# Patient Record
Sex: FEMALE | Race: BLACK OR AFRICAN AMERICAN | NOT HISPANIC OR LATINO | Employment: UNEMPLOYED | URBAN - METROPOLITAN AREA
[De-identification: names, ages, dates, MRNs, and addresses within clinical notes are randomized per-mention and may not be internally consistent; named-entity substitution may affect disease eponyms.]

---

## 2023-03-22 ENCOUNTER — HOSPITAL ENCOUNTER (EMERGENCY)
Facility: HOSPITAL | Age: 48
Discharge: HOME | End: 2023-03-22
Attending: EMERGENCY MEDICINE

## 2023-03-22 VITALS
TEMPERATURE: 98 F | HEART RATE: 112 BPM | DIASTOLIC BLOOD PRESSURE: 126 MMHG | WEIGHT: 133.6 LBS | SYSTOLIC BLOOD PRESSURE: 158 MMHG | RESPIRATION RATE: 22 BRPM | OXYGEN SATURATION: 98 %

## 2023-03-22 DIAGNOSIS — J32.9 SINUSITIS: Primary | ICD-10-CM

## 2023-03-22 DIAGNOSIS — M70.60 TROCHANTERIC BURSITIS: ICD-10-CM

## 2023-03-22 RX ORDER — NAPROXEN 500 MG/1
500 TABLET ORAL 2 TIMES DAILY WITH MEALS
Qty: 14 TABLET | Refills: 0 | Status: SHIPPED | OUTPATIENT
Start: 2023-03-22 | End: 2023-03-29

## 2023-03-22 RX ORDER — METHIMAZOLE 10 MG/1
10 TABLET ORAL DAILY
COMMUNITY

## 2023-03-22 RX ORDER — ALBUTEROL SULFATE 90 UG/1
2 AEROSOL, METERED RESPIRATORY (INHALATION) EVERY 6 HOURS PRN
COMMUNITY

## 2023-03-22 RX ORDER — AMLODIPINE BESYLATE 10 MG/1
10 TABLET ORAL DAILY
COMMUNITY

## 2023-03-22 RX ORDER — AMOXICILLIN AND CLAVULANATE POTASSIUM 875; 125 MG/1; MG/1
1 TABLET, FILM COATED ORAL EVERY 12 HOURS SCHEDULED
Qty: 14 TABLET | Refills: 0 | Status: SHIPPED | OUTPATIENT
Start: 2023-03-22 | End: 2023-03-29

## 2023-03-22 NOTE — ED NOTES
PA came and assessed patient and discharged before nursing    assessment  Pt   Was seen walking with no distress      Arnoldo Carrillo, RN  03/22/23 4722

## 2023-03-22 NOTE — ED PROVIDER NOTES
History  Chief Complaint   Patient presents with   • Cough     leg   • Leg Pain     Relates pain in L upper leg since accident many years ago  States recently it swelled up and went down again   • Nasal Congestion     C/o  post nasal drip for past three days causing her to cough     79-year-old female presenting today for evaluation of cough and nasal congestion over the past 2 days  Relays that she has a tendency to develop sinus infections rather easily  Notes a postnasal drip  Dry nonproductive cough  Notes mild sore throat and generalized body aches  States that the left outer hip was swollen and tender, this has been an ongoing issue over the past year since she had a fall landing directly onto that hip  Has had imaging which did not show any fractures  States that the swelling has gone down  Ongoing lower back pain  Denies calf pain or swelling, numbness, paresthesias, fevers, shortness of breath  Differential includes but is not limited to viral illness, sinusitis, bursitis  Prior to Admission Medications   Prescriptions Last Dose Informant Patient Reported? Taking? albuterol (PROVENTIL HFA,VENTOLIN HFA) 90 mcg/act inhaler   Yes Yes   Sig: Inhale 2 puffs every 6 (six) hours as needed for wheezing   amLODIPine (NORVASC) 10 mg tablet   Yes Yes   Sig: Take 10 mg by mouth daily   methimazole (TAPAZOLE) 10 mg tablet   Yes Yes   Sig: Take 10 mg by mouth in the morning      Facility-Administered Medications: None       Past Medical History:   Diagnosis Date   • Asthma    • Disease of thyroid gland    • Hypertension        Past Surgical History:   Procedure Laterality Date   • TUBAL LIGATION         History reviewed  No pertinent family history  I have reviewed and agree with the history as documented      E-Cigarette/Vaping   • E-Cigarette Use Never User      E-Cigarette/Vaping Substances     Social History     Tobacco Use   • Smoking status: Every Day     Packs/day: 0 50     Types: Cigarettes • Smokeless tobacco: Never   Vaping Use   • Vaping Use: Never used   Substance Use Topics   • Alcohol use: Not Currently   • Drug use: Yes     Types: Marijuana       Review of Systems   Constitutional: Negative  Negative for chills, fever and unexpected weight change  Denies IV drug use     HENT: Positive for congestion and sore throat  Negative for dental problem, drooling, ear discharge, ear pain, sinus pressure and sneezing  Eyes: Negative  Respiratory: Positive for cough  Negative for chest tightness, shortness of breath and wheezing  Cardiovascular: Negative  Negative for chest pain and palpitations  Gastrointestinal: Negative  Negative for abdominal pain, constipation, diarrhea, nausea and vomiting  Genitourinary: Negative  Negative for difficulty urinating, dysuria, flank pain, frequency, hematuria and urgency  Denies numbness, tingling in the groin  Musculoskeletal: Positive for arthralgias and back pain  Negative for gait problem, joint swelling, myalgias, neck pain and neck stiffness  Skin: Negative  Negative for color change  Neurological: Negative  Negative for dizziness, tremors, weakness, light-headedness, numbness and headaches  All other systems reviewed and are negative  Physical Exam  Physical Exam  Vitals and nursing note reviewed  Constitutional:       General: She is not in acute distress  Appearance: She is well-developed  She is not diaphoretic  HENT:      Head: Normocephalic and atraumatic  Right Ear: External ear normal       Left Ear: External ear normal       Nose: Nose normal       Mouth/Throat:      Pharynx: No oropharyngeal exudate  Eyes:      General: No scleral icterus  Right eye: No discharge  Left eye: No discharge  Conjunctiva/sclera: Conjunctivae normal       Pupils: Pupils are equal, round, and reactive to light  Cardiovascular:      Rate and Rhythm: Regular rhythm  Tachycardia present  Pulses: Normal pulses  Heart sounds: Normal heart sounds  No murmur heard  No friction rub  No gallop  Pulmonary:      Effort: Pulmonary effort is normal  No respiratory distress  Breath sounds: Normal breath sounds  No stridor  No wheezing or rales  Comments: SPO2 is 98% indicating adequate oxygenation  Chest:      Chest wall: No tenderness  Abdominal:      General: Abdomen is flat  Bowel sounds are normal  There is no distension  Palpations: Abdomen is soft  There is no mass  Tenderness: There is no abdominal tenderness  There is no guarding or rebound  Hernia: No hernia is present  Musculoskeletal:        Arms:       Cervical back: Normal range of motion and neck supple  Legs:    Lymphadenopathy:      Cervical: No cervical adenopathy  Skin:     General: Skin is warm and dry  Capillary Refill: Capillary refill takes less than 2 seconds  Coloration: Skin is not pale  Findings: No erythema or rash  Neurological:      General: No focal deficit present  Mental Status: She is alert and oriented to person, place, and time  Mental status is at baseline           Vital Signs  ED Triage Vitals [03/22/23 1322]   Temperature Pulse Respirations Blood Pressure SpO2   98 °F (36 7 °C) (!) 112 22 (!) 158/126 98 %      Temp Source Heart Rate Source Patient Position - Orthostatic VS BP Location FiO2 (%)   Tympanic Monitor Sitting Right arm --      Pain Score       8           Vitals:    03/22/23 1322   BP: (!) 158/126   Pulse: (!) 112   Patient Position - Orthostatic VS: Sitting         Visual Acuity      ED Medications  Medications - No data to display    Diagnostic Studies  Results Reviewed     None                 No orders to display              Procedures  Procedures         ED Course                               SBIRT 22yo+    Flowsheet Row Most Recent Value   SBIRT (25 yo +)    In order to provide better care to our patients, we are screening all of our patients for alcohol and drug use  Would it be okay to ask you these screening questions? Yes Filed at: 03/22/2023 1353   Initial Alcohol Screen: US AUDIT-C     1  How often do you have a drink containing alcohol? 0 Filed at: 03/22/2023 1353   2  How many drinks containing alcohol do you have on a typical day you are drinking? 0 Filed at: 03/22/2023 1353   3a  Male UNDER 65: How often do you have five or more drinks on one occasion? 0 Filed at: 03/22/2023 1353   3b  FEMALE Any Age, or MALE 65+: How often do you have 4 or more drinks on one occassion? 0 Filed at: 03/22/2023 1353   Audit-C Score 0 Filed at: 03/22/2023 1353   JJ: How many times in the past year have you    Used an illegal drug or used a prescription medication for non-medical reasons? Never Filed at: 03/22/2023 1353                    Medical Decision Making  Patient appears well no distress, given history will treat for early sinusitis  Otherwise suspect greater trochanter bursitis  We will have patient follow-up with PCP  Patient is informed to return to the emergency department for worsening of symptoms and was given proper education regarding their diagnosis and symptoms  Otherwise the patient is informed to follow up with their primary care doctor for re-evaluation  The patient verbalizes understanding and agrees with above assessment and plan  All questions were answered  Please Note: Fluency Direct voice recognition software may have been used in the creation of this document  Wrong words or sound a like substitutions may have occurred due to the inherent limitations of the voice software  Sinusitis: acute illness or injury  Trochanteric bursitis: chronic illness or injury  Risk  Prescription drug management            Disposition  Final diagnoses:   Sinusitis   Trochanteric bursitis     Time reflects when diagnosis was documented in both MDM as applicable and the Disposition within this note     Time User Action Codes Description Comment    3/22/2023  2:11 PM Anish Ruiz Add [J32 9] Sinusitis     3/22/2023  2:11 PM Anish Pace Add [M70 60] Trochanteric bursitis       ED Disposition     ED Disposition   Discharge    Condition   Stable    Date/Time   Wed Mar 22, 2023  2:11 PM    Comment   Becky Livers discharge to home/self care  Follow-up Information     Follow up With Specialties Details Why Contact Info Additional [address] Emergency Department Emergency Medicine Go to  If symptoms worsen [address] [place] [address]  [phone] [address] [place] [address] Emergency Department, [place], [place], [address] [place] [address] Family Medicine Schedule an appointment as soon as possible for a visit in 1 day  [address] [place] [address] Dr Trang Yanes MD Otolaryngology Schedule an appointment as soon as possible for a visit in 1 day  [facility], [address] [place] [address]  [phone]             Patient's Medications   Discharge Prescriptions    AMOXICILLIN-CLAVULANATE (AUGMENTIN) 875-125 MG PER TABLET    Take 1 tablet by mouth every 12 (twelve) hours for 7 days       Start Date: 3/22/2023 End Date: 3/29/2023       Order Dose: 1 tablet       Quantity: 14 tablet    Refills: 0    NAPROXEN (NAPROSYN) 500 MG TABLET    Take 1 tablet (500 mg total) by mouth 2 (two) times a day with meals for 7 days       Start Date: 3/22/2023 End Date: 3/29/2023       Order Dose: 500 mg       Quantity: 14 tablet    Refills: 0       No discharge procedures on file      PDMP Review     None          ED Provider  Electronically Signed by           Asiya Pierson PA-C  03/22/23 4673

## 2023-07-19 ENCOUNTER — HOSPITAL ENCOUNTER (EMERGENCY)
Facility: HOSPITAL | Age: 48
Discharge: HOME | End: 2023-07-19
Attending: EMERGENCY MEDICINE
Payer: COMMERCIAL

## 2023-07-19 VITALS
HEART RATE: 85 BPM | OXYGEN SATURATION: 100 % | TEMPERATURE: 98.2 F | DIASTOLIC BLOOD PRESSURE: 90 MMHG | RESPIRATION RATE: 20 BRPM | SYSTOLIC BLOOD PRESSURE: 137 MMHG

## 2023-07-19 DIAGNOSIS — J32.9 SINUSITIS: Primary | ICD-10-CM

## 2023-07-19 PROCEDURE — 99284 EMERGENCY DEPT VISIT MOD MDM: CPT | Performed by: PHYSICIAN ASSISTANT

## 2023-07-19 PROCEDURE — 99282 EMERGENCY DEPT VISIT SF MDM: CPT

## 2023-07-19 RX ORDER — FLUTICASONE PROPIONATE 50 MCG
1 SPRAY, SUSPENSION (ML) NASAL DAILY
Qty: 16 G | Refills: 0 | Status: SHIPPED | OUTPATIENT
Start: 2023-07-19

## 2023-07-19 RX ORDER — PREDNISONE 50 MG/1
50 TABLET ORAL DAILY
Qty: 3 TABLET | Refills: 0 | Status: SHIPPED | OUTPATIENT
Start: 2023-07-19 | End: 2023-07-22

## 2023-07-19 NOTE — ED PROVIDER NOTES
History  Chief Complaint   Patient presents with   • Sore Throat   • Sinus Problem     Pt reports of sinus pressure with a headache started 2 days. Pt sts " I feel like I have a sinus infection."     Pt is a 52 yo F with PMH of HTN, Asthma, hyperthyroidism, and recurrent sinusitis who presents for evaluation of two days of nasal congestion, sinus pressure and pain, and headache. Sinus Problem  Pain details:     Location:  Frontal    Quality:  Pressure    Severity:  Moderate    Duration:  2 days    Timing:  Constant  Duration:  2 days  Progression:  Worsening  Chronicity:  Recurrent  Context: allergies    Context: not recent URI    Relieved by:  Nothing  Worsened by:  Lying down  Ineffective treatments:  None tried  Associated symptoms: headaches and sore throat    Associated symptoms: no chest pain, no chills, no cough, no ear pain, no fatigue, no fever, no hoarse voice, no mouth breathing, no nausea, no rhinorrhea, no shortness of breath, no sneezing, no snoring, no swollen glands, no tooth pain, no vertigo, no vomiting and no wheezing    Headaches:     Severity:  Moderate    Onset quality:  Gradual    Duration:  2 days    Timing:  Constant    Progression:  Worsening    Chronicity:  New  Risk factors: asthma    Risk factors: no allergic reaction and no COPD        Prior to Admission Medications   Prescriptions Last Dose Informant Patient Reported? Taking?    albuterol (PROVENTIL HFA,VENTOLIN HFA) 90 mcg/act inhaler   Yes No   Sig: Inhale 2 puffs every 6 (six) hours as needed for wheezing   amLODIPine (NORVASC) 10 mg tablet   Yes No   Sig: Take 10 mg by mouth daily   methimazole (TAPAZOLE) 10 mg tablet   Yes No   Sig: Take 10 mg by mouth in the morning   naproxen (NAPROSYN) 500 mg tablet   No No   Sig: Take 1 tablet (500 mg total) by mouth 2 (two) times a day with meals for 7 days      Facility-Administered Medications: None       Past Medical History:   Diagnosis Date   • Asthma    • Disease of thyroid gland • Hypertension        Past Surgical History:   Procedure Laterality Date   • TUBAL LIGATION         No family history on file. I have reviewed and agree with the history as documented. E-Cigarette/Vaping   • E-Cigarette Use Never User      E-Cigarette/Vaping Substances     Social History     Tobacco Use   • Smoking status: Every Day     Packs/day: 0.50     Types: Cigarettes   • Smokeless tobacco: Never   Vaping Use   • Vaping Use: Never used   Substance Use Topics   • Alcohol use: Not Currently   • Drug use: Yes     Types: Marijuana       Review of Systems   Constitutional: Negative for chills, fatigue and fever. HENT: Positive for sinus pressure, sinus pain and sore throat. Negative for ear pain, hoarse voice, rhinorrhea and sneezing. Eyes: Negative. Negative for pain and visual disturbance. Respiratory: Negative. Negative for snoring, cough, shortness of breath and wheezing. Cardiovascular: Negative. Negative for chest pain and palpitations. Gastrointestinal: Negative. Negative for abdominal pain, nausea and vomiting. Endocrine: Negative. Genitourinary: Negative. Negative for dysuria and hematuria. Musculoskeletal: Negative. Negative for arthralgias and back pain. Skin: Negative. Negative for color change and rash. Allergic/Immunologic: Negative. Neurological: Positive for headaches. Negative for vertigo, seizures and syncope. Hematological: Negative. Psychiatric/Behavioral: Negative. All other systems reviewed and are negative. Physical Exam  Physical Exam  Vitals and nursing note reviewed. Constitutional:       General: She is not in acute distress. Appearance: She is well-developed. HENT:      Head: Normocephalic and atraumatic. Right Ear: Tympanic membrane and ear canal normal.      Left Ear: Tympanic membrane and ear canal normal.      Nose: Congestion present.       Mouth/Throat:      Mouth: Mucous membranes are moist.      Pharynx: Uvula midline. Posterior oropharyngeal erythema present. No pharyngeal swelling. Tonsils: No tonsillar exudate or tonsillar abscesses. Eyes:      Conjunctiva/sclera: Conjunctivae normal.   Cardiovascular:      Rate and Rhythm: Normal rate and regular rhythm. Pulses: Normal pulses. Heart sounds: No murmur heard. Pulmonary:      Effort: Pulmonary effort is normal. No respiratory distress. Breath sounds: Normal breath sounds. Abdominal:      Palpations: Abdomen is soft. Tenderness: There is no abdominal tenderness. Musculoskeletal:         General: No swelling. Cervical back: Normal range of motion and neck supple. Skin:     General: Skin is warm and dry. Capillary Refill: Capillary refill takes less than 2 seconds. Neurological:      General: No focal deficit present. Mental Status: She is alert and oriented to person, place, and time. Psychiatric:         Mood and Affect: Mood normal.         Behavior: Behavior normal.         Vital Signs  ED Triage Vitals [07/19/23 0855]   Temperature Pulse Respirations Blood Pressure SpO2   98.2 °F (36.8 °C) 85 20 137/90 100 %      Temp Source Heart Rate Source Patient Position - Orthostatic VS BP Location FiO2 (%)   Temporal Monitor Sitting Right arm --      Pain Score       --           Vitals:    07/19/23 0855   BP: 137/90   Pulse: 85   Patient Position - Orthostatic VS: Sitting         Visual Acuity      ED Medications  Medications - No data to display    Diagnostic Studies  Results Reviewed     None                 No orders to display              Procedures  Procedures         ED Course                               SBIRT 22yo+    Flowsheet Row Most Recent Value   Initial Alcohol Screen: US AUDIT-C     1. How often do you have a drink containing alcohol? 0 Filed at: 07/19/2023 0919   2. How many drinks containing alcohol do you have on a typical day you are drinking? 0 Filed at: 07/19/2023 0919   3a. Male UNDER 65:  How often do you have five or more drinks on one occasion? 0 Filed at: 07/19/2023 0919   3b. FEMALE Any Age, or MALE 65+: How often do you have 4 or more drinks on one occassion? 0 Filed at: 07/19/2023 0919   Audit-C Score 0 Filed at: 07/19/2023 7471   JJ: How many times in the past year have you. .. Used an illegal drug or used a prescription medication for non-medical reasons? Never Filed at: 07/19/2023 7560                    Medical Decision Making  Sinusitis: acute illness or injury     Details: acute sinutisits  unlikely  infection at [facility]  steroid burst followed by flonase  pt educated on red flag sx that would necessitate return to ed. Risk  Prescription drug management. Disposition  Final diagnoses:   Sinusitis     Time reflects when diagnosis was documented in both MDM as applicable and the Disposition within this note     Time User Action Codes Description Comment    7/19/2023  9:54 AM Dayron Perkins [J32.9] Sinusitis       ED Disposition     ED Disposition   Discharge    Condition   Stable    Date/Time   Wed Jul 19, 2023  9:55 AM    Comment   Jhonathan Duncan discharge to home/self care.                Follow-up Information     Follow up With Specialties Details Why Contact Info Additional [address] [facility] Family Medicine Call  As needed [address], [address] [facility]  [phone]       [employer]  Call  to establish PCP in [address] [facility] Emergency Department Emergency Medicine Go to  If symptoms worsen [place]  [address] [place] [address] Emergency Department, [address] [place] [address], [phone]          Discharge Medication List as of 7/19/2023  9:55 AM      START taking these medications    Details   fluticasone (FLONASE) 50 mcg/act nasal spray 1 spray into each nostril daily, Starting Wed 7/19/2023, Normal      predniSONE 50 mg tablet Take 1 tablet (50 mg total) by mouth daily for 3 days, Starting Wed 7/19/2023, Until Sat 7/22/2023, Normal         CONTINUE these medications which have NOT CHANGED    Details   albuterol (PROVENTIL HFA,VENTOLIN HFA) 90 mcg/act inhaler Inhale 2 puffs every 6 (six) hours as needed for wheezing, Historical Med      amLODIPine (NORVASC) 10 mg tablet Take 10 mg by mouth daily, Historical Med      methimazole (TAPAZOLE) 10 mg tablet Take 10 mg by mouth in the morning, Historical Med      naproxen (NAPROSYN) 500 mg tablet Take 1 tablet (500 mg total) by mouth 2 (two) times a day with meals for 7 days, Starting Wed 3/22/2023, Until Wed 3/29/2023, Normal                 PDMP Review     None          ED Provider  Electronically Signed by           Marga Skiff, PA-C  07/21/23 8124

## 2023-07-19 NOTE — Clinical Note
Karey Shon was seen and treated in our emergency department on 7/19/2023. Diagnosis:     Hever Sylvester  may return to work on return date. She may return on this date: 07/20/2023         If you have any questions or concerns, please don't hesitate to call.       Mel Ragland PA-C    ______________________________           _______________          _______________  Hospital Representative                              Date                                Time

## 2023-09-16 ENCOUNTER — HOSPITAL ENCOUNTER (EMERGENCY)
Facility: HOSPITAL | Age: 48
Discharge: HOME | End: 2023-09-16
Attending: EMERGENCY MEDICINE
Payer: COMMERCIAL

## 2023-09-16 VITALS
OXYGEN SATURATION: 99 % | SYSTOLIC BLOOD PRESSURE: 181 MMHG | HEART RATE: 77 BPM | RESPIRATION RATE: 18 BRPM | DIASTOLIC BLOOD PRESSURE: 109 MMHG | TEMPERATURE: 97.1 F

## 2023-09-16 DIAGNOSIS — J32.9 SINUSITIS: Primary | ICD-10-CM

## 2023-09-16 PROCEDURE — 99282 EMERGENCY DEPT VISIT SF MDM: CPT

## 2023-09-16 PROCEDURE — 99284 EMERGENCY DEPT VISIT MOD MDM: CPT | Performed by: PHYSICIAN ASSISTANT

## 2023-09-16 RX ORDER — AMOXICILLIN AND CLAVULANATE POTASSIUM 875; 125 MG/1; MG/1
1 TABLET, FILM COATED ORAL EVERY 12 HOURS SCHEDULED
Qty: 20 TABLET | Refills: 0 | Status: SHIPPED | OUTPATIENT
Start: 2023-09-16 | End: 2023-09-26

## 2023-09-16 RX ORDER — DEXAMETHASONE 4 MG/1
10 TABLET ORAL ONCE
Status: COMPLETED | OUTPATIENT
Start: 2023-09-16 | End: 2023-09-16

## 2023-09-16 RX ADMIN — DEXAMETHASONE 10 MG: 4 TABLET ORAL at 09:44

## 2023-09-16 NOTE — ED PROVIDER NOTES
History  Chief Complaint   Patient presents with   • Sinus Problem     Patient reports sinus pressure since Tuesday, progressively worsening. Headache, SOB with exertion, productive cough with yellow/green sputum production  Denies fevers/chills/nausea     79-year-old female presenting today with acute on chronic sinusitis-like symptoms. States that she started with a URI about 10 days ago and has had persistent left-sided facial pain and pressure. Patient relays that she has a long history of sinusitis and is scheduled to see ENT the end of October, relays that she cannot wait that long. Has been taking Allegra and Flonase with minimal relief. Now has developed some headaches accompanied with worsening facial pain and pressure. Felt feverish a few days ago subjective. Denies nausea, vomiting, neck pain or stiffness, rash. Prior to Admission Medications   Prescriptions Last Dose Informant Patient Reported? Taking? albuterol (PROVENTIL HFA,VENTOLIN HFA) 90 mcg/act inhaler   Yes No   Sig: Inhale 2 puffs every 6 (six) hours as needed for wheezing   amLODIPine (NORVASC) 10 mg tablet   Yes No   Sig: Take 10 mg by mouth daily   fluticasone (FLONASE) 50 mcg/act nasal spray   No No   Si spray into each nostril daily   methimazole (TAPAZOLE) 10 mg tablet   Yes No   Sig: Take 10 mg by mouth in the morning   naproxen (NAPROSYN) 500 mg tablet   No No   Sig: Take 1 tablet (500 mg total) by mouth 2 (two) times a day with meals for 7 days      Facility-Administered Medications: None       Past Medical History:   Diagnosis Date   • Asthma    • Disease of thyroid gland    • Hypertension        Past Surgical History:   Procedure Laterality Date   • TUBAL LIGATION         History reviewed. No pertinent family history. I have reviewed and agree with the history as documented.     E-Cigarette/Vaping   • E-Cigarette Use Never User      E-Cigarette/Vaping Substances     Social History     Tobacco Use   • Smoking status: Every Day     Packs/day: 0.50     Types: Cigarettes   • Smokeless tobacco: Never   Vaping Use   • Vaping Use: Never used   Substance Use Topics   • Alcohol use: Not Currently   • Drug use: Yes     Types: Marijuana       Review of Systems   Constitutional: Negative. Negative for chills, fatigue and fever. HENT: Positive for congestion, rhinorrhea, sinus pressure and sinus pain. Negative for dental problem, drooling, ear discharge, ear pain, facial swelling, hearing loss, mouth sores, nosebleeds, postnasal drip, sneezing, sore throat, tinnitus, trouble swallowing and voice change. Eyes: Negative. Respiratory: Positive for cough. Negative for apnea, choking, chest tightness, shortness of breath, wheezing and stridor. Cardiovascular: Negative. Gastrointestinal: Negative. Negative for abdominal pain, diarrhea, nausea and vomiting. Endocrine: Negative. Genitourinary: Negative. Musculoskeletal: Negative. Skin: Negative. Neurological: Positive for headaches. Negative for dizziness, seizures, syncope, facial asymmetry, speech difficulty, weakness, light-headedness and numbness. Hematological: Negative. Psychiatric/Behavioral: Negative. All other systems reviewed and are negative. Physical Exam  Physical Exam  Vitals and nursing note reviewed. Constitutional:       Appearance: Normal appearance. HENT:      Head: Normocephalic and atraumatic. Right Ear: Tympanic membrane, ear canal and external ear normal.      Left Ear: Tympanic membrane, ear canal and external ear normal.      Nose: Nose normal.      Mouth/Throat:      Mouth: Mucous membranes are moist.      Pharynx: Oropharynx is clear. Eyes:      Conjunctiva/sclera: Conjunctivae normal.   Cardiovascular:      Rate and Rhythm: Normal rate. Pulses: Normal pulses. Heart sounds: Normal heart sounds. Pulmonary:      Effort: Pulmonary effort is normal.      Breath sounds: Normal breath sounds. Abdominal:      General: There is no distension. Musculoskeletal:         General: No deformity. Normal range of motion. Cervical back: Normal range of motion. Skin:     General: Skin is warm and dry. Capillary Refill: Capillary refill takes less than 2 seconds. Findings: No rash. Neurological:      General: No focal deficit present. Mental Status: She is alert and oriented to person, place, and time. Mental status is at baseline. Psychiatric:         Mood and Affect: Mood normal.         Behavior: Behavior normal.         Thought Content: Thought content normal.         Judgment: Judgment normal.         Vital Signs  ED Triage Vitals [09/16/23 0927]   Temperature Pulse Respirations Blood Pressure SpO2   (!) 97.1 °F (36.2 °C) 77 18 (!) 181/109 99 %      Temp Source Heart Rate Source Patient Position - Orthostatic VS BP Location FiO2 (%)   Tympanic Monitor Sitting Left arm --      Pain Score       --           Vitals:    09/16/23 0927   BP: (!) 181/109   Pulse: 77   Patient Position - Orthostatic VS: Sitting         Visual Acuity      ED Medications  Medications   dexamethasone (DECADRON) tablet 10 mg (10 mg Oral Given 9/16/23 0944)       Diagnostic Studies  Results Reviewed     None                 No orders to display              Procedures  Procedures         ED Course                                             Medical Decision Making  Symptoms and timeline consistent with bacterial sinusitis. Highly encourage patient follow-up with her ENT and PCP. Patient is informed to return to the emergency department for worsening of symptoms and was given proper education regarding their diagnosis and symptoms. Otherwise the patient is informed to follow up with their primary care doctor/ENT for re-evaluation. The patient verbalizes understanding and agrees with above assessment and plan. All questions were answered.           Sinusitis: acute illness or injury  Risk  Prescription drug management. Disposition  Final diagnoses:   Sinusitis     Time reflects when diagnosis was documented in both MDM as applicable and the Disposition within this note     Time User Action Codes Description Comment    9/16/2023  9:36 AM Jacquelyn Perkins [J32.9] Sinusitis       ED Disposition     ED Disposition   Discharge    Condition   Stable    Date/Time   Sat Sep 16, 2023  9:36 AM    Comment   Orman Goltz discharge to home/self care. Follow-up Information     Follow up With Specialties Details Why Contact Info Additional [address] [facility] Emergency Department Emergency Medicine Go to  If symptoms worsen, otherwise please follow up with your family doctor [address] [address]  [address] [facility] Emergency Department, [address] , [facility], [address]          Discharge Medication List as of 9/16/2023  9:40 AM      START taking these medications    Details   amoxicillin-clavulanate (AUGMENTIN) 875-125 mg per tablet Take 1 tablet by mouth every 12 (twelve) hours for 10 days, Starting Sat 9/16/2023, Until Tue 9/26/2023, Normal         CONTINUE these medications which have NOT CHANGED    Details   albuterol (PROVENTIL HFA,VENTOLIN HFA) 90 mcg/act inhaler Inhale 2 puffs every 6 (six) hours as needed for wheezing, Historical Med      amLODIPine (NORVASC) 10 mg tablet Take 10 mg by mouth daily, Historical Med      fluticasone (FLONASE) 50 mcg/act nasal spray 1 spray into each nostril daily, Starting Wed 7/19/2023, Normal      methimazole (TAPAZOLE) 10 mg tablet Take 10 mg by mouth in the morning, Historical Med      naproxen (NAPROSYN) 500 mg tablet Take 1 tablet (500 mg total) by mouth 2 (two) times a day with meals for 7 days, Starting Wed 3/22/2023, Until Wed 3/29/2023, Normal             No discharge procedures on file.     PDMP Review     None          ED Provider  Electronically Signed by Ishan Mccurdy, [place]  09/16/23 1012

## 2023-10-30 ENCOUNTER — OFFICE VISIT (OUTPATIENT)
Dept: OTOLARYNGOLOGY | Facility: CLINIC | Age: 48
End: 2023-10-30
Payer: COMMERCIAL

## 2023-10-30 VITALS — HEIGHT: 65 IN | BODY MASS INDEX: 23.66 KG/M2 | WEIGHT: 142 LBS | TEMPERATURE: 97.4 F

## 2023-10-30 DIAGNOSIS — R09.82 POST-NASAL DRIP: ICD-10-CM

## 2023-10-30 DIAGNOSIS — J34.2 DEVIATED NASAL SEPTUM: ICD-10-CM

## 2023-10-30 DIAGNOSIS — L29.9 ITCHING OF EAR: ICD-10-CM

## 2023-10-30 DIAGNOSIS — J32.9 RECURRENT SINUS INFECTIONS: Primary | ICD-10-CM

## 2023-10-30 DIAGNOSIS — R44.8 SENSATION OF PRESSURE IN FACE: ICD-10-CM

## 2023-10-30 DIAGNOSIS — J34.3 HYPERTROPHY OF BOTH INFERIOR NASAL TURBINATES: ICD-10-CM

## 2023-10-30 PROCEDURE — 31231 NASAL ENDOSCOPY DX: CPT | Performed by: STUDENT IN AN ORGANIZED HEALTH CARE EDUCATION/TRAINING PROGRAM

## 2023-10-30 PROCEDURE — 99204 OFFICE O/P NEW MOD 45 MIN: CPT | Performed by: STUDENT IN AN ORGANIZED HEALTH CARE EDUCATION/TRAINING PROGRAM

## 2023-10-30 RX ORDER — PREDNISONE 10 MG/1
TABLET ORAL
Qty: 21 TABLET | Refills: 0 | Status: SHIPPED | OUTPATIENT
Start: 2023-10-30

## 2023-10-30 RX ORDER — CYCLOBENZAPRINE HCL 10 MG
10 TABLET ORAL DAILY PRN
COMMUNITY
Start: 2023-09-22

## 2023-10-30 RX ORDER — FLUOCINOLONE ACETONIDE 0.11 MG/ML
5 OIL AURICULAR (OTIC) 2 TIMES DAILY
Qty: 20 ML | Refills: 0 | Status: SHIPPED | OUTPATIENT
Start: 2023-10-30

## 2023-10-30 RX ORDER — AZELASTINE 1 MG/ML
1 SPRAY, METERED NASAL 2 TIMES DAILY
Qty: 30 ML | Refills: 3 | Status: SHIPPED | OUTPATIENT
Start: 2023-10-30 | End: 2023-11-29

## 2023-10-30 RX ORDER — DOXYCYCLINE HYCLATE 100 MG/1
100 TABLET, DELAYED RELEASE ORAL 2 TIMES DAILY
Qty: 28 TABLET | Refills: 0 | Status: SHIPPED | OUTPATIENT
Start: 2023-10-30 | End: 2023-11-13

## 2023-10-30 RX ORDER — LISINOPRIL 10 MG/1
10 TABLET ORAL DAILY
COMMUNITY
Start: 2023-08-29

## 2023-10-30 NOTE — PROGRESS NOTES
[facility]  [facility] [address] [facility]  Otolaryngology -- Head and Neck Surgery New Patient Visit    [facility]. Hugo, a 78-year-old, has come in with a primary concern that has been bothering her for the past two years, which is recurrent sinus infections. She has been prescribed multiple courses of antibiotics over the past few months to address this issue. Regarding her nose and sinuses, she reports symptoms such as nasal blockage, chronic nasal drainage, post-nasal drip, facial pressure, headaches, diminished sense of smell, a persistent cough, and frequent throat clearing. However, she denies any history of sneezing, itchy eyes, or nasal bleeding. Roxanne Maldonado has not undergone any nose or sinus surgeries, but she does have a history of bronchial asthma. There is no recent sinus CT scan or history of allergy skin testing. She mentions that Flonase causes headaches and also reports itching in her ears. Review of systems: Pertinent review of systems documented in the HPI. 10 point ROS documented in a separate note, as necessary. Results reviewed; images from any scan have been personally reviewed: The past medical, surgical, social and family history have been reviewed as documented in today's record. Past Medical History:   Diagnosis Date    Asthma     Disease of thyroid gland     Hypertension      Past Surgical History:   Procedure Laterality Date    TUBAL LIGATION       History reviewed. No pertinent family history.   Current Outpatient Medications on File Prior to Visit   Medication Sig Dispense Refill    albuterol (PROVENTIL HFA,VENTOLIN HFA) 90 mcg/act inhaler Inhale 2 puffs every 6 (six) hours as needed for wheezing      amLODIPine (NORVASC) 10 mg tablet Take 10 mg by mouth daily      methimazole (TAPAZOLE) 10 mg tablet Take 10 mg by mouth in the morning      cyclobenzaprine (FLEXERIL) 10 mg tablet Take 10 mg by mouth daily as needed (Patient not taking: Reported on 10/30/2023)      fluticasone (FLONASE) 50 mcg/act nasal spray 1 spray into each nostril daily (Patient not taking: Reported on 10/30/2023) 16 g 0    lisinopril (ZESTRIL) 10 mg tablet Take 10 mg by mouth daily (Patient not taking: Reported on 10/30/2023)      naproxen (NAPROSYN) 500 mg tablet Take 1 tablet (500 mg total) by mouth 2 (two) times a day with meals for 7 days 14 tablet 0     No current facility-administered medications on file prior to visit. Physical exam:   Temp (!) 97.4 °F (36.3 °C) (Temporal)   Ht 5' 4.5" (1.638 m)   Wt 64.4 kg (142 lb)   BMI 24.00 kg/m²   Head: Atraumatic, no visible scalp lesions, parotid and submandibular salivary glands non-tender to palpation and without masses bilaterally. Neck:  No visible or palpable cervical lesions or lymphadenopathy, thyroid gland is normal in size and symmetry and without masses, normal laryngeal elevation with swallowing. Ears:    Right ear :  Auricle normal in appearance, mastoid prominence non-tender, external auditory canal clear. Tympanic membranes intact. Left ear :  Auricle normal in appearance, mastoid prominence non-tender, external auditory canal clear . Tympanic membranes intact. Nose/Sinuses:  External appearance unremarkable, no maxillary or frontal sinus tenderness to palpation bilaterally. Nasal endoscopic examination showed deviated nasal septum, bilateral enlarged inferior turbinates, no polyps, thick clear mucus, middle, superior meatus and sphenoethmoid recess clear. Oral Cavity:  Moist mucus membranes, gums and dentition unremarkable, no oral mucosal masses or lesions, floor of mouth soft, tongue mobile without masses or lesions. Oropharynx:  Base of tongue soft and without masses, tonsils bilaterally unremarkable, soft palate mucosa unremarkable.       Eyes:  Extra-ocular movements intact, pupils equally round and reactive to light and accommodation, the lids and conjunctivae are normal in appearance. Constitutional:  Well developed, well nourished and groomed, in no acute distress. Cardiovascular:  Normal rate and rhythm, no palpable thrills, no jugulovenous distension observed. Respiratory:  Normal respiratory effort without evidence of retractions or use of accessory muscles. Neurologic:  Cranial nerves II-XII intact bilaterally. Abdomen: Soft and lax  Extremities: No bruises   Psychiatric:  Alert and oriented to time, place and person. Procedures  Rigid nasal endoscopic examination:  The nasal cavities were decongested with lidocaine and oxymetazoline spray. Bilateral nasal endoscopy was performed as follows:  Endoscopy type: 0 degree rigid scope  Results: look above  The patient tolerated the procedure well. Assessment:   1. Recurrent sinus infections  predniSONE 10 mg tablet    doxycycline (DORYX) 100 MG EC tablet    azelastine (ASTELIN) 0.1 % nasal spray      2. Itching of ear  fluocinolone acetonide (DermOtic) 0.01 % otic oil      3. Hypertrophy of both inferior nasal turbinates        4. Deviated nasal septum        5. Sensation of pressure in face        6. Post-nasal drip          Orders  No orders of the defined types were placed in this encounter. Discussion/Plan:      Recurrent acute rhinosinusitis, Rhinitis and Post nasal drip   Discussed treatment options including use of saline rinses, nasal steroids, allergy medications, allergy testing, imaging, and further surgical interventions. Given instructions on use of nasal steroids, saline rinses and allergy medications. Possible CT scan if no improvement after medication use for more than 4 weeks.         Doxycycline  Prednisolone  Azelastine  Saline rinse      Itch ear  Derm otic oil drops

## 2023-11-03 ENCOUNTER — TELEPHONE (OUTPATIENT)
Dept: OTOLARYNGOLOGY | Facility: CLINIC | Age: 48
End: 2023-11-03

## 2023-11-03 NOTE — TELEPHONE ENCOUNTER
Patient called to ask for a different antibiotic. The Doxycycline is too costly of an antibiotic for the patient. The patient did  the rest of the medications prescribed. Please advise. Thank you!

## 2023-11-06 ENCOUNTER — TELEPHONE (OUTPATIENT)
Dept: OTHER | Facility: HOSPITAL | Age: 48
End: 2023-11-06

## 2023-11-06 DIAGNOSIS — J32.9 CHRONIC RHINOSINUSITIS: Primary | ICD-10-CM

## 2023-11-06 RX ORDER — AMOXICILLIN AND CLAVULANATE POTASSIUM 875; 125 MG/1; MG/1
1 TABLET, FILM COATED ORAL EVERY 12 HOURS SCHEDULED
Qty: 28 TABLET | Refills: 0 | Status: SHIPPED | OUTPATIENT
Start: 2023-11-06 | End: 2023-11-20

## 2024-01-08 ENCOUNTER — OFFICE VISIT (OUTPATIENT)
Dept: OTOLARYNGOLOGY | Facility: CLINIC | Age: 49
End: 2024-01-08
Payer: COMMERCIAL

## 2024-01-08 VITALS — HEIGHT: 65 IN | BODY MASS INDEX: 23.66 KG/M2 | WEIGHT: 142 LBS

## 2024-01-08 DIAGNOSIS — R09.82 POST-NASAL DRIP: ICD-10-CM

## 2024-01-08 DIAGNOSIS — J34.2 DEVIATED NASAL SEPTUM: ICD-10-CM

## 2024-01-08 DIAGNOSIS — J32.9 RECURRENT SINUS INFECTIONS: Primary | ICD-10-CM

## 2024-01-08 DIAGNOSIS — L29.9 ITCHING OF EAR: ICD-10-CM

## 2024-01-08 DIAGNOSIS — R44.8 SENSATION OF PRESSURE IN FACE: ICD-10-CM

## 2024-01-08 DIAGNOSIS — J34.3 HYPERTROPHY OF BOTH INFERIOR NASAL TURBINATES: ICD-10-CM

## 2024-01-08 PROCEDURE — 99214 OFFICE O/P EST MOD 30 MIN: CPT | Performed by: STUDENT IN AN ORGANIZED HEALTH CARE EDUCATION/TRAINING PROGRAM

## 2024-01-08 PROCEDURE — 31231 NASAL ENDOSCOPY DX: CPT | Performed by: STUDENT IN AN ORGANIZED HEALTH CARE EDUCATION/TRAINING PROGRAM

## 2024-01-08 NOTE — PROGRESS NOTES
Otolaryngology-- Head and Neck Surgery Follow up visit      Follow up:    10/30/23:  Cherie Arias, a 48-year-old, has come in with a primary concern that has been bothering her for the past two years, which is recurrent sinus infections. She has been prescribed multiple courses of antibiotics over the past few months to address this issue. Regarding her nose and sinuses, she reports symptoms such as nasal blockage, chronic nasal drainage, post-nasal drip, facial pressure, headaches, diminished sense of smell, a persistent cough, and frequent throat clearing. However, she denies any history of sneezing, itchy eyes, or nasal bleeding. Cherie has not undergone any nose or sinus surgeries, but she does have a history of bronchial asthma. There is no recent sinus CT scan or history of allergy skin testing. She mentions that Flonase causes headaches and also reports itching in her ears.     1/8/24:  Still complaining of PND and cough  Doxycycline  Prednisolone  Azelastine  Saline rinse  Did not help          Review of any relevant imaging:      Interval Review of systems: Pertinent review of systems documented in the HPI.    Interval Social History:  Social History     Socioeconomic History    Marital status: /Civil Union     Spouse name: Not on file    Number of children: Not on file    Years of education: Not on file    Highest education level: Not on file   Occupational History    Not on file   Tobacco Use    Smoking status: Every Day     Current packs/day: 0.50     Types: Cigarettes    Smokeless tobacco: Never   Vaping Use    Vaping status: Never Used   Substance and Sexual Activity    Alcohol use: Not Currently    Drug use: Yes     Types: Marijuana    Sexual activity: Not on file   Other Topics Concern    Not on file   Social History Narrative    Not on file     Social Determinants of Health     Financial Resource Strain: Not on file   Food Insecurity: Not on file   Transportation Needs: Not on file  "  Physical Activity: Not on file   Stress: Not on file   Social Connections: Not on file   Intimate Partner Violence: Not on file   Housing Stability: Not on file       Interval Physical Examination:  Ht 5' 4.5\" (1.638 m)   Wt 64.4 kg (142 lb)   BMI 24.00 kg/m²       Head: Atraumatic, no visible scalp lesions, parotid and submandibular salivary glands non-tender to palpation and without masses bilaterally.   Neck:  No visible or palpable cervical lesions or lymphadenopathy, thyroid gland is normal in size and symmetry and without masses, normal laryngeal elevation with swallowing.   Ears:    Right ear :  Auricle normal in appearance, mastoid prominence non-tender, external auditory canal clear. Tympanic membranes intact.   Left ear :  Auricle normal in appearance, mastoid prominence non-tender, external auditory canal clear . Tympanic membranes intact.   Nose/Sinuses:  External appearance unremarkable, no maxillary or frontal sinus tenderness to palpation bilaterally. Nasal endoscopic examination showed deviated nasal septum, bilateral enlarged inferior turbinates, no polyps, thick clear mucus, middle, superior meatus and sphenoethmoid recess clear.     Oral Cavity:  Moist mucus membranes, gums and dentition unremarkable, no oral mucosal masses or lesions, floor of mouth soft, tongue mobile without masses or lesions.   Oropharynx:  Base of tongue soft and without masses, tonsils bilaterally unremarkable, soft palate mucosa unremarkable.      Eyes:  Extra-ocular movements intact, pupils equally round and reactive to light and accommodation, the lids and conjunctivae are normal in appearance.  Constitutional:  Well developed, well nourished and groomed, in no acute distress.   Cardiovascular:  Normal rate and rhythm, no palpable thrills, no jugulovenous distension observed.  Respiratory:  Normal respiratory effort without evidence of retractions or use of accessory muscles.  Neurologic:  Cranial nerves II-XII intact " bilaterally.  Abdomen: Soft and lax  Extremities: No bruises   Psychiatric:  Alert and oriented to time, place and person.  Procedures  Rigid nasal endoscopic examination:  The nasal cavities were decongested with lidocaine and oxymetazoline spray.  Bilateral nasal endoscopy was performed as follows:  Endoscopy type: 0 degree rigid scope  Results: look above  The patient tolerated the procedure well.         Assessment:  1. Recurrent sinus infections        2. Itching of ear        3. Hypertrophy of both inferior nasal turbinates        4. Deviated nasal septum        5. Sensation of pressure in face        6. Post-nasal drip            Plan:    Nasal spray. Abx and steroids did not help  CT sinus ordered

## 2024-01-23 ENCOUNTER — TELEPHONE (OUTPATIENT)
Dept: OTOLARYNGOLOGY | Facility: CLINIC | Age: 49
End: 2024-01-23

## 2024-02-21 ENCOUNTER — HOSPITAL ENCOUNTER (EMERGENCY)
Facility: HOSPITAL | Age: 49
Discharge: HOME | End: 2024-02-21
Attending: EMERGENCY MEDICINE
Payer: COMMERCIAL

## 2024-02-21 VITALS
DIASTOLIC BLOOD PRESSURE: 95 MMHG | SYSTOLIC BLOOD PRESSURE: 175 MMHG | RESPIRATION RATE: 18 BRPM | OXYGEN SATURATION: 98 % | TEMPERATURE: 97.3 F | BODY MASS INDEX: 23.39 KG/M2 | WEIGHT: 138.4 LBS | HEART RATE: 81 BPM

## 2024-02-21 DIAGNOSIS — H57.89 IRRITATION OF RIGHT EYE: Primary | ICD-10-CM

## 2024-02-21 DIAGNOSIS — Z76.0 ENCOUNTER FOR MEDICATION REFILL: ICD-10-CM

## 2024-02-21 PROCEDURE — 99282 EMERGENCY DEPT VISIT SF MDM: CPT

## 2024-02-21 PROCEDURE — 99284 EMERGENCY DEPT VISIT MOD MDM: CPT | Performed by: EMERGENCY MEDICINE

## 2024-02-21 RX ORDER — TETRACAINE HYDROCHLORIDE 5 MG/ML
1 SOLUTION OPHTHALMIC ONCE
Status: COMPLETED | OUTPATIENT
Start: 2024-02-21 | End: 2024-02-21

## 2024-02-21 RX ORDER — AMLODIPINE BESYLATE 10 MG/1
10 TABLET ORAL DAILY
Qty: 30 TABLET | Refills: 0 | Status: SHIPPED | OUTPATIENT
Start: 2024-02-21

## 2024-02-21 RX ADMIN — FLUORESCEIN SODIUM 1 STRIP: 1 STRIP OPHTHALMIC at 16:35

## 2024-02-21 RX ADMIN — TETRACAINE HYDROCHLORIDE 1 DROP: 5 SOLUTION OPHTHALMIC at 16:35

## 2024-02-21 NOTE — DISCHARGE INSTRUCTIONS
Follow-up with primary care and ophthalmology as needed for further care. Contact info provided below if needed.  Use over the counter medications as stated on the bottle as needed for pain control.  Continue to flush eye as needed.   Return to the ED with new or worsening symptoms.

## 2024-02-21 NOTE — ED PROVIDER NOTES
History  Chief Complaint   Patient presents with    Eye Problem     Pt c/o right eye pain and feel like something is in her eye. Pt c/o blurred vision swelling and drainage.      Pt is a 49yo F who presents for eye irritation.  Patient reports that she felt well yesterday and when waking up this morning noticed right eye irritation.  She notes a right eye foreign body and states it has been tearing most of the day.  Patient denies any knowledge of any foreign bodies or potential foreign bodies.  Patient reports she has been using Visine with no improvement.  Patient reports she feels as though her vision is blurry because of the tearing.  Patient denies any purulent drainage.  Patient denies any other complaints.  Patient does also state that she has history of high blood pressure and has recently run out of her medication.  Patient states she recently moved to this area and therefore was unable to get a refill on her prescription.  Patient states she is otherwise healthy.        Prior to Admission Medications   Prescriptions Last Dose Informant Patient Reported? Taking?   albuterol (PROVENTIL HFA,VENTOLIN HFA) 90 mcg/act inhaler   Yes No   Sig: Inhale 2 puffs every 6 (six) hours as needed for wheezing   amLODIPine (NORVASC) 10 mg tablet   Yes No   Sig: Take 10 mg by mouth daily   azelastine (ASTELIN) 0.1 % nasal spray   No No   Si spray into each nostril 2 (two) times a day Use in each nostril as directed   cyclobenzaprine (FLEXERIL) 10 mg tablet   Yes No   Sig: Take 10 mg by mouth daily as needed   Patient not taking: Reported on 10/30/2023   fluocinolone acetonide (DermOtic) 0.01 % otic oil   No No   Sig: Administer 5 drops into both ears 2 (two) times a day   fluticasone (FLONASE) 50 mcg/act nasal spray   No No   Si spray into each nostril daily   Patient not taking: Reported on 10/30/2023   lisinopril (ZESTRIL) 10 mg tablet   Yes No   Sig: Take 10 mg by mouth daily   Patient not taking: Reported on  10/30/2023   methimazole (TAPAZOLE) 10 mg tablet   Yes No   Sig: Take 10 mg by mouth in the morning   naproxen (NAPROSYN) 500 mg tablet   No No   Sig: Take 1 tablet (500 mg total) by mouth 2 (two) times a day with meals for 7 days      Facility-Administered Medications: None       Past Medical History:   Diagnosis Date    Asthma     Disease of thyroid gland     Hypertension        Past Surgical History:   Procedure Laterality Date    TUBAL LIGATION         History reviewed. No pertinent family history.  I have reviewed and agree with the history as documented.    E-Cigarette/Vaping    E-Cigarette Use Never User      E-Cigarette/Vaping Substances    Nicotine No     THC No     CBD No     Flavoring No     Other No     Unknown No      Social History     Tobacco Use    Smoking status: Every Day     Current packs/day: 0.50     Types: Cigarettes    Smokeless tobacco: Never   Vaping Use    Vaping status: Never Used   Substance Use Topics    Alcohol use: Not Currently    Drug use: Yes     Types: Marijuana       Review of Systems   Eyes:  Positive for pain (L).   All other systems reviewed and are negative.      Physical Exam  Physical Exam  Vitals reviewed.   Constitutional:       General: She is not in acute distress.     Appearance: She is well-developed. She is not toxic-appearing or diaphoretic.   HENT:      Head: Normocephalic and atraumatic.      Right Ear: External ear normal.      Left Ear: External ear normal.      Nose: Nose normal.      Mouth/Throat:      Pharynx: Oropharynx is clear.   Eyes:      General: Lids are normal. Lids are everted, no foreign bodies appreciated. Vision grossly intact. Gaze aligned appropriately. No visual field deficit.        Right eye: No foreign body or discharge.      Extraocular Movements: Extraocular movements intact.      Right eye: Normal extraocular motion.      Conjunctiva/sclera:      Right eye: Right conjunctiva is injected (mild, lateral). No chemosis, exudate or  hemorrhage.     Pupils: Pupils are equal, round, and reactive to light.   Cardiovascular:      Rate and Rhythm: Normal rate and regular rhythm.      Heart sounds: Normal heart sounds. No murmur heard.  Pulmonary:      Effort: Pulmonary effort is normal. No respiratory distress.      Breath sounds: Normal breath sounds.   Abdominal:      General: There is no distension.      Palpations: Abdomen is soft.      Tenderness: There is no abdominal tenderness.   Musculoskeletal:         General: Normal range of motion.      Cervical back: Normal range of motion and neck supple.      Right lower leg: No edema.      Left lower leg: No edema.   Skin:     General: Skin is warm and dry.      Capillary Refill: Capillary refill takes less than 2 seconds.      Coloration: Skin is not pale.      Findings: No erythema or rash.   Neurological:      General: No focal deficit present.      Mental Status: She is alert and oriented to person, place, and time.   Psychiatric:         Speech: Speech normal.         Behavior: Behavior is cooperative.         Vital Signs  ED Triage Vitals [02/21/24 1609]   Temperature Pulse Respirations Blood Pressure SpO2   (!) 97.3 °F (36.3 °C) 81 18 (!) 175/95 98 %      Temp Source Heart Rate Source Patient Position - Orthostatic VS BP Location FiO2 (%)   Tympanic Monitor Sitting Right arm --      Pain Score       --           Vitals:    02/21/24 1609   BP: (!) 175/95   Pulse: 81   Patient Position - Orthostatic VS: Sitting         Visual Acuity  Visual Acuity      Flowsheet Row Most Recent Value   Visual acuity R eye is 20/30  [with glasses]   Visual acuity Left eye is 20/30  [with glasses]   Visual acuity in both eyes is 20/25  [with glasses]   Wearing corrective eyewear/lenses? Yes   L Pupil Size (mm) 3   R Pupil Size (mm) 3   L Pupil Shape Round   R Pupil Shape Round            ED Medications  Medications   fluorescein sodium sterile ophthalmic strip 1 strip (1 strip Both Eyes Given 2/21/24 2905)    tetracaine 0.5 % ophthalmic solution 1 drop (1 drop Both Eyes Given 2/21/24 1635)       Diagnostic Studies  Results Reviewed       None                   No orders to display              Procedures  Procedures         ED Course  ED Course as of 02/21/24 1713   Wed Feb 21, 2024   1704 Blood Pressure(!): 175/95  Pt currently out of home anti-hypertensives.   1704 Visual acuity 20/30 individual eyes, 20/25 both eyes.   1705 Bedside eye exam shows no obvious FB, corneal abrasion, or corneal ulcer with staining. Eye flushed with movement of the FB sensation. Eye re-examined with no obvious FB noted.                                              Medical Decision Making  Pt is a 47yo F who presents with eye irritation.     Differential diagnosis to include but not limited to foreign body, ulcer, abrasion, conjunctivitis.  No obvious cause of symptoms.  As vision not changed, no further workup indicated at this time.  Will recommend outpatient ophthalmologic follow-up if continuing to cause issues.  See ED course for results and details.    Plan to discharge pt with f/u to PCP and ophthalmology. Discussed returning the ED with new or worsening of symptoms. Discussed use of over the counter medications as stated on the bottle as needed for pain. Discussed flushing the eye. Pt expressed understanding of discharge instructions, return precautions, and medication instructions and is stable for discharge at this time. All questions were answered and pt was discharged without incident.       Risk  Prescription drug management.             Disposition  Final diagnoses:   Irritation of right eye   Encounter for medication refill     Time reflects when diagnosis was documented in both MDM as applicable and the Disposition within this note       Time User Action Codes Description Comment    2/21/2024  5:02 PM Pilar Teixeira Add [H57.89] Irritation of right eye     2/21/2024  5:04 PM Pilar Teixeira Add [Z76.0] Encounter for  medication refill           ED Disposition       ED Disposition   Discharge    Condition   Stable    Date/Time   Wed Feb 21, 2024  5:02 PM    Comment   Cherieaura Arias discharge to home/self care.                   Follow-up Information       Follow up With Specialties Details Why Contact Info    [facility] Call  As needed [address], [address]  [facility] [address]  [phone]      [facility]  Call  As needed [phone]      Mendez Ba MD Ophthalmology Call  As needed [address]  [address]  [place] [address]  [phone]              Patient's Medications   Discharge Prescriptions    AMLODIPINE (NORVASC) 10 MG TABLET    Take 1 tablet (10 mg total) by mouth daily       Start Date: 2/21/2024 End Date: --       Order Dose: 10 mg       Quantity: 30 tablet    Refills: 0       No discharge procedures on file.    PDMP Review       None            ED Provider  Electronically Signed by             Pilar Teixeira MD  02/21/24 2707

## 2024-02-28 ENCOUNTER — HOSPITAL ENCOUNTER (EMERGENCY)
Facility: HOSPITAL | Age: 49
Discharge: HOME | End: 2024-02-28
Attending: EMERGENCY MEDICINE
Payer: COMMERCIAL

## 2024-02-28 ENCOUNTER — APPOINTMENT (EMERGENCY)
Dept: RADIOLOGY | Facility: HOSPITAL | Age: 49
End: 2024-02-28
Payer: COMMERCIAL

## 2024-02-28 VITALS
RESPIRATION RATE: 18 BRPM | HEART RATE: 85 BPM | OXYGEN SATURATION: 99 % | SYSTOLIC BLOOD PRESSURE: 147 MMHG | DIASTOLIC BLOOD PRESSURE: 93 MMHG | TEMPERATURE: 98.1 F

## 2024-02-28 DIAGNOSIS — M25.552 LEFT HIP PAIN: Primary | ICD-10-CM

## 2024-02-28 PROCEDURE — 93971 EXTREMITY STUDY: CPT

## 2024-02-28 PROCEDURE — 99283 EMERGENCY DEPT VISIT LOW MDM: CPT

## 2024-02-28 PROCEDURE — 93971 EXTREMITY STUDY: CPT | Performed by: SURGERY

## 2024-02-28 PROCEDURE — 99284 EMERGENCY DEPT VISIT MOD MDM: CPT | Performed by: EMERGENCY MEDICINE

## 2024-02-28 PROCEDURE — 73502 X-RAY EXAM HIP UNI 2-3 VIEWS: CPT

## 2024-02-28 RX ORDER — NAPROXEN 500 MG/1
500 TABLET ORAL 2 TIMES DAILY WITH MEALS
Qty: 14 TABLET | Refills: 0 | Status: SHIPPED | OUTPATIENT
Start: 2024-02-28 | End: 2024-03-06

## 2024-02-29 NOTE — ED PROVIDER NOTES
History  Chief Complaint   Patient presents with    Hip Pain     Left hip pain since a fall 7 years ago. Swelling and pain worse last four months.     Patient presents for evaluation of left hip pain from a fall 7 years ago.  Fell down the stairs evaluation was negative at that time.  However over the last 4 months pain has gotten worse and feels like the left leg is swollen.  Denies any new trauma or injury.      History provided by:  Patient   used: No    Hip Pain      Prior to Admission Medications   Prescriptions Last Dose Informant Patient Reported? Taking?   albuterol (PROVENTIL HFA,VENTOLIN HFA) 90 mcg/act inhaler   Yes No   Sig: Inhale 2 puffs every 6 (six) hours as needed for wheezing   amLODIPine (NORVASC) 10 mg tablet   Yes No   Sig: Take 10 mg by mouth daily   amLODIPine (NORVASC) 10 mg tablet   No No   Sig: Take 1 tablet (10 mg total) by mouth daily   azelastine (ASTELIN) 0.1 % nasal spray   No No   Si spray into each nostril 2 (two) times a day Use in each nostril as directed   cyclobenzaprine (FLEXERIL) 10 mg tablet   Yes No   Sig: Take 10 mg by mouth daily as needed   Patient not taking: Reported on 10/30/2023   fluocinolone acetonide (DermOtic) 0.01 % otic oil   No No   Sig: Administer 5 drops into both ears 2 (two) times a day   fluticasone (FLONASE) 50 mcg/act nasal spray   No No   Si spray into each nostril daily   Patient not taking: Reported on 10/30/2023   lisinopril (ZESTRIL) 10 mg tablet   Yes No   Sig: Take 10 mg by mouth daily   Patient not taking: Reported on 10/30/2023   methimazole (TAPAZOLE) 10 mg tablet   Yes No   Sig: Take 10 mg by mouth in the morning   naproxen (NAPROSYN) 500 mg tablet   No No   Sig: Take 1 tablet (500 mg total) by mouth 2 (two) times a day with meals for 7 days      Facility-Administered Medications: None       Past Medical History:   Diagnosis Date    Asthma     Disease of thyroid gland     Hypertension        Past Surgical History:    Procedure Laterality Date    TUBAL LIGATION         History reviewed. No pertinent family history.  I have reviewed and agree with the history as documented.    E-Cigarette/Vaping    E-Cigarette Use Never User      E-Cigarette/Vaping Substances    Nicotine No     THC No     CBD No     Flavoring No     Other No     Unknown No      Social History     Tobacco Use    Smoking status: Every Day     Current packs/day: 0.50     Types: Cigarettes    Smokeless tobacco: Never   Vaping Use    Vaping status: Never Used   Substance Use Topics    Alcohol use: Not Currently    Drug use: Yes     Types: Marijuana       Review of Systems   All other systems reviewed and are negative.      Physical Exam  Physical Exam  Vitals and nursing note reviewed.   Constitutional:       General: She is not in acute distress.  Cardiovascular:      Rate and Rhythm: Normal rate and regular rhythm.      Pulses: Normal pulses.   Pulmonary:      Effort: Pulmonary effort is normal. No respiratory distress.      Breath sounds: Normal breath sounds.   Musculoskeletal:         General: Tenderness present. No deformity. Normal range of motion.      Comments: Mild lateral left hip tenderness no pitting edema distal neurovascular intact normal range of motion   Skin:     Capillary Refill: Capillary refill takes less than 2 seconds.      Findings: No rash.   Neurological:      General: No focal deficit present.      Mental Status: She is alert and oriented to person, place, and time.      Gait: Gait normal.         Vital Signs  ED Triage Vitals [02/28/24 1454]   Temperature Pulse Respirations Blood Pressure SpO2   98.1 °F (36.7 °C) 85 18 147/93 99 %      Temp Source Heart Rate Source Patient Position - Orthostatic VS BP Location FiO2 (%)   Temporal Monitor Sitting Right arm --      Pain Score       --           Vitals:    02/28/24 1454   BP: 147/93   Pulse: 85   Patient Position - Orthostatic VS: Sitting         Visual Acuity      ED Medications  Medications  - No data to display    Diagnostic Studies  Results Reviewed       None                   VAS lower limb venous duplex study, unilateral/limited   Final Result by Bruce Laird DO (02/28 1610)      XR hip/pelv 2-3 vws left   Final Result by Venu Carreon MD (02/28 1619)      No acute osseous abnormality.            Workstation performed: [id number]                    Procedures  Procedures         ED Course  ED Course as of 02/29/24 1630   Wed Feb 28, 2024   1554 DVT study negative                               SBIRT 20yo+      Flowsheet Row Most Recent Value   JJ: How many times in the past year have you...    Used an illegal drug or used a prescription medication for non-medical reasons? Never Filed at: 02/28/2024 1457                      Medical Decision Making  Pulse ox 99% on room air indicating adequate oxygenation.  Xray L hip/pelvis: No fracture or dislocation as read by me        Amount and/or Complexity of Data Reviewed  Radiology: ordered.    Risk  Prescription drug management.             Disposition  Final diagnoses:   Left hip pain     Time reflects when diagnosis was documented in both MDM as applicable and the Disposition within this note       Time User Action Codes Description Comment    2/28/2024  3:54 PM Kristofer Partida Add [M25.552] Left hip pain           ED Disposition       ED Disposition   Discharge    Condition   Stable    Date/Time   Wed Feb 28, 2024 1554    Comment   Cherie Arias discharge to home/self care.                   Follow-up Information       Follow up With Specialties Details Why Contact Info    Jose Roberson MD Vascular Surgery   [address] [facility]  3rd [address] [place] [address]  [phone]              Discharge Medication List as of 2/28/2024  4:38 PM        CONTINUE these medications which have CHANGED    Details   naproxen (NAPROSYN) 500 mg tablet Take 1 tablet (500 mg total) by mouth 2 (two) times a day with meals for 7 days, Starting  Wed 2/28/2024, Until Wed 3/6/2024, Normal           CONTINUE these medications which have NOT CHANGED    Details   albuterol (PROVENTIL HFA,VENTOLIN HFA) 90 mcg/act inhaler Inhale 2 puffs every 6 (six) hours as needed for wheezing, Historical Med      !! amLODIPine (NORVASC) 10 mg tablet Take 10 mg by mouth daily, Historical Med      !! amLODIPine (NORVASC) 10 mg tablet Take 1 tablet (10 mg total) by mouth daily, Starting Wed 2/21/2024, Normal      azelastine (ASTELIN) 0.1 % nasal spray 1 spray into each nostril 2 (two) times a day Use in each nostril as directed, Starting Mon 10/30/2023, Until Wed 11/29/2023, Normal      cyclobenzaprine (FLEXERIL) 10 mg tablet Take 10 mg by mouth daily as needed, Starting Fri 9/22/2023, Historical Med      fluocinolone acetonide (DermOtic) 0.01 % otic oil Administer 5 drops into both ears 2 (two) times a day, Starting Mon 10/30/2023, Normal      fluticasone (FLONASE) 50 mcg/act nasal spray 1 spray into each nostril daily, Starting Wed 7/19/2023, Normal      lisinopril (ZESTRIL) 10 mg tablet Take 10 mg by mouth daily, Starting Tue 8/29/2023, Historical Med      methimazole (TAPAZOLE) 10 mg tablet Take 10 mg by mouth in the morning, Historical Med       !! - Potential duplicate medications found. Please discuss with provider.              PDMP Review       None            ED Provider  Electronically Signed by             Kristofer Partida DO  02/29/24 1831

## 2024-03-08 ENCOUNTER — OFFICE VISIT (OUTPATIENT)
Dept: OBGYN CLINIC | Facility: CLINIC | Age: 49
End: 2024-03-08
Payer: COMMERCIAL

## 2024-03-08 VITALS
WEIGHT: 135.2 LBS | HEART RATE: 88 BPM | DIASTOLIC BLOOD PRESSURE: 87 MMHG | HEIGHT: 65 IN | SYSTOLIC BLOOD PRESSURE: 139 MMHG | BODY MASS INDEX: 22.53 KG/M2

## 2024-03-08 DIAGNOSIS — M70.62 GREATER TROCHANTERIC BURSITIS OF LEFT HIP: Primary | ICD-10-CM

## 2024-03-08 DIAGNOSIS — M25.552 LEFT HIP PAIN: ICD-10-CM

## 2024-03-08 DIAGNOSIS — G89.29 CHRONIC LEFT SHOULDER PAIN: ICD-10-CM

## 2024-03-08 DIAGNOSIS — M25.512 CHRONIC LEFT SHOULDER PAIN: ICD-10-CM

## 2024-03-08 PROCEDURE — 99203 OFFICE O/P NEW LOW 30 MIN: CPT | Performed by: ORTHOPAEDIC SURGERY

## 2024-03-08 RX ORDER — TIMOLOL MALEATE 5 MG/ML
SOLUTION/ DROPS OPHTHALMIC
COMMUNITY
Start: 2024-03-05

## 2024-03-08 NOTE — PROGRESS NOTES
Assessment/Plan:  1. Greater trochanteric bursitis of left hip  Ambulatory Referral to Physical Therapy      2. Left hip pain  Ambulatory Referral to Orthopedic Surgery    Ambulatory Referral to Physical Therapy      3. Chronic left shoulder pain  Ambulatory Referral to Orthopedic Surgery        Scribe Attestation      I,:  Mj Medrano am acting as a scribe while in the presence of the attending physician.:       I,:  Melquiades Slade, DO personally performed the services described in this documentation    as scribed in my presence.:           Cherie is a pleasant 48-year-old female who presents today for initial evaluation of her left hip pain.  After reviewing her imaging and performing a thorough history and physical exam I explained that she is symptomatic of greater trochanteric bursitis about her left hip.  I recommended that she initiate physical therapy and provided her with a referral today.  I also recommended she begin using Voltaren gel up to 3 times per day about the lateral aspect of her hip.  Finally, I did provide her with a referral to my colleague Dr. Uriarte for evaluation of her left shoulder pain.  All of her questions and concerns were addressed today.  I would like to see her back approximately 2 months after she has initiated physical therapy for repeat clinical evaluation.    Subjective: Initial evaluation for left hip pain    Patient ID: Cherie Arias is a 48 y.o. female who presents today for initial evaluation of her left hip pain.  At today's visit, she complains of intermittent pain and swelling about the lateral aspect of her hip for the last few years.  This has been increasing in frequency and intensity over the last few months.  She has been attempting to use naproxen but this does not provide relief of her pain.  She does use topical Tiger balm at times.  Unrelated to her hip, she also reports chronic pain about her left shoulder.  She denies any recent injury or  trauma.    Review of Systems   Constitutional:  Positive for activity change. Negative for chills, fever and unexpected weight change.   HENT:  Negative for hearing loss, nosebleeds and sore throat.    Eyes:  Negative for pain, redness and visual disturbance.   Respiratory:  Negative for cough, shortness of breath and wheezing.    Cardiovascular:  Negative for chest pain, palpitations and leg swelling.   Gastrointestinal:  Negative for abdominal pain, nausea and vomiting.   Endocrine: Negative for polydipsia and polyuria.   Genitourinary:  Negative for dysuria and hematuria.   Musculoskeletal:  Positive for arthralgias and myalgias. Negative for joint swelling.        See HPI   Skin:  Negative for rash and wound.   Neurological:  Negative for dizziness, numbness and headaches.   Psychiatric/Behavioral:  Negative for decreased concentration and suicidal ideas. The patient is not nervous/anxious.          Past Medical History:   Diagnosis Date    Asthma     Disease of thyroid gland     Hypertension        Past Surgical History:   Procedure Laterality Date    TUBAL LIGATION         History reviewed. No pertinent family history.    Social History     Occupational History    Not on file   Tobacco Use    Smoking status: Every Day     Current packs/day: 0.50     Types: Cigarettes    Smokeless tobacco: Never   Vaping Use    Vaping status: Never Used   Substance and Sexual Activity    Alcohol use: Not Currently    Drug use: Yes     Types: Marijuana    Sexual activity: Not on file         Current Outpatient Medications:     albuterol (PROVENTIL HFA,VENTOLIN HFA) 90 mcg/act inhaler, Inhale 2 puffs every 6 (six) hours as needed for wheezing, Disp: , Rfl:     amLODIPine (NORVASC) 10 mg tablet, Take 10 mg by mouth daily, Disp: , Rfl:     amLODIPine (NORVASC) 10 mg tablet, Take 1 tablet (10 mg total) by mouth daily, Disp: 30 tablet, Rfl: 0    fluocinolone acetonide (DermOtic) 0.01 % otic oil, Administer 5 drops into both ears 2  (two) times a day, Disp: 20 mL, Rfl: 0    methimazole (TAPAZOLE) 10 mg tablet, Take 10 mg by mouth in the morning, Disp: , Rfl:     timolol (TIMOPTIC) 0.5 % ophthalmic solution, INSTILL 1 DROP INTO BOTH EYES IN THE MORNING, Disp: , Rfl:     azelastine (ASTELIN) 0.1 % nasal spray, 1 spray into each nostril 2 (two) times a day Use in each nostril as directed, Disp: 30 mL, Rfl: 3    cyclobenzaprine (FLEXERIL) 10 mg tablet, Take 10 mg by mouth daily as needed (Patient not taking: Reported on 10/30/2023), Disp: , Rfl:     fluticasone (FLONASE) 50 mcg/act nasal spray, 1 spray into each nostril daily (Patient not taking: Reported on 10/30/2023), Disp: 16 g, Rfl: 0    lisinopril (ZESTRIL) 10 mg tablet, Take 10 mg by mouth daily (Patient not taking: Reported on 10/30/2023), Disp: , Rfl:     naproxen (NAPROSYN) 500 mg tablet, Take 1 tablet (500 mg total) by mouth 2 (two) times a day with meals for 7 days, Disp: 14 tablet, Rfl: 0    naproxen (NAPROSYN) 500 mg tablet, Take 1 tablet (500 mg total) by mouth 2 (two) times a day with meals for 7 days, Disp: 14 tablet, Rfl: 0    Allergies   Allergen Reactions    Shellfish-Derived Products - Food Allergy Hives       Objective:  Vitals:    03/08/24 1329   BP: 139/87   Pulse: 88       Body mass index is 22.85 kg/m².    Left Hip Exam     Tenderness   The patient is experiencing tenderness in the greater trochanter and lateral.    Range of Motion   Abduction:  50   Adduction:  30   Flexion:  120   External rotation:  50   Internal rotation: 30     Muscle Strength   Flexion: 5/5     Tests   ELIZABETH: negative    Other   Erythema: absent  Scars: absent  Sensation: normal  Pulse: present    Comments:  Stinchfield: equivocal  FADIR: negative            Physical Exam  Vitals and nursing note reviewed.   Constitutional:       Appearance: Normal appearance. She is well-developed.   HENT:      Head: Normocephalic and atraumatic.      Right Ear: External ear normal.      Left Ear: External ear  normal.   Eyes:      General: No scleral icterus.     Extraocular Movements: Extraocular movements intact.      Conjunctiva/sclera: Conjunctivae normal.   Cardiovascular:      Rate and Rhythm: Normal rate.   Pulmonary:      Effort: Pulmonary effort is normal. No respiratory distress.   Musculoskeletal:      Cervical back: Normal range of motion and neck supple.      Comments: See Ortho exam   Skin:     General: Skin is warm and dry.   Neurological:      General: No focal deficit present.      Mental Status: She is alert and oriented to person, place, and time.   Psychiatric:         Behavior: Behavior normal.         I have personally reviewed pertinent films in PACS.    X-ray of the left hip obtained on 2/20/2024 reviewed demonstrating no acute fracture, dislocation, lytic or blastic lesion, or significant degenerative change.    This document was created using speech voice recognition software.   Grammatical errors, random word insertions, pronoun errors, and incomplete sentences are an occasional consequence of this system due to software limitations, ambient noise, and hardware issues.   Any formal questions or concerns about content, text, or information contained within the body of this dictation should be directly addressed to the provider for clarification.

## 2024-03-08 NOTE — PATIENT INSTRUCTIONS
FACILITY:  [address] [place] [address] 530  1958  [id number]    DATE: 10/21/22  SURGEON:  Dr. Deon Clinton MD    ASSISTANT: Dr. Micheal Khanna MD PGY4  PREOPERATIVE DIAGNOSIS:  Gross hematuria and elevated PSA   POSTOPERATIVE DIAGNOSIS:  Gross hematuria and elevated PSA with urethral flap  PROCEDURES PERFORMED:  1. Cystourethroscopy. 2. Urethral dilation  2. Bilateral retrograde pyelogram.  4. Transrectal ultrasound of the prostate with prostate biopsies  ANESTHESIA:  MAC converted to General   COMPLICATIONS:  None  DRAINS:  None  SPECIMEN:  Urine for FISH ([facility])  ESTIMATED BLOOD LOSS:  Less than 15 mL. INDICATIONS FOR THE PROCEDURE:  Darien Green is a 71 y.o. male presents with a history of gross hematuria. Imaging was done in the form of renal US on 9/12/22 which showed no abnormalities; CT A/P on 8/8 showed bladder wall thickening but no other abnormalities at that time. Patient's PSA was 55.89. The patient follows up today for cystourethroscopy with bilateral retrograde pyelogram to complete the hematuria workup. The risks and benefits of the procedure, as well as possible alternatives and complications were discussed and he consented. DETAILS OF THE PROCEDURE:  The patient was correctly identified in the preoperative holding area. The patient was brought back to the operating room and placed in the dorsal lithotomy position. Anesthesia was administered; antibiotics administered by Anesthesia. EPC cuffs were on and functional. The patient was then prepped and draped in the usual sterile fashion. Once an appropriate time out had been performed, with all parties consenting, a 25 Greek cystoscope with a 30-degree lens was placed through the urethra until we ran into a flap midurethral, we were unable to pass our scope therefore a glidewire was inserted into the bladder. Confirmation with fluoroscopy was obtained that it was in the bladder. We then dilated from 16-20F. Voltaren Gel - up to 3 times per day on outside of hip   We passed our scope into the bladder and did a pan cystoscopy which didn't show any tumors, masses, or diverticulum. The right ureteral orifice was cannulated with a 5 Italian ureteral catheter. Contrast was injected and the right ureter however due to C arm not being able to move up to the renal pelvis due to the bed we were only able to visualize the distal ureter. There was a tortuous ureter noted distally but no abnormalities or filling defects. The catheter was removed and placed in the left ureteral orifice. The procedure was repeated once again with  no abnormalities or filling defects present in the distal ureter. The bladder was left full and the cystoscope was removed. We then placed a 18 [place] tip catheter into the bladder with ease with 10 cc in the balloon. Urine was sent for FISH. We then turned our attention to the prostate biopsy portion of our case. The ultrasound probe was placed per rectum. The prostate was brought into view. As previously noted his prostates proximally 40.8 g. Seminal vesicles appeared normal.  We used 1 percent lidocaine to inject around the neurovascular bundle bilaterally. We then proceeded with a standard sextant biopsy with 2 cores per area starting on the right side in the left side for a total of 24 specimens. There was a hypoechoic nodule located on the right transitional (right mid) zone. These core specimens were sent off  for permanent pathology. After completion of the biopsy we then removed the ultrasound probe. There is no evidence of brisk bleeding per the rectum. The patient tolerated the procedure well. His anesthesia was reversed and he was extubated. The patient tolerated the procedure well and was sent to the PACU for postoperative monitoring. Please note Dr. Luis Carlos Caldwell was present for all critical portions of the procedure.        Plan:  The patient was discharged home in stable condition with instructions to follow up in clinic for catheter

## 2024-03-14 ENCOUNTER — APPOINTMENT (OUTPATIENT)
Dept: RADIOLOGY | Facility: CLINIC | Age: 49
End: 2024-03-14
Payer: COMMERCIAL

## 2024-03-14 ENCOUNTER — OFFICE VISIT (OUTPATIENT)
Dept: OBGYN CLINIC | Facility: CLINIC | Age: 49
End: 2024-03-14
Payer: COMMERCIAL

## 2024-03-14 ENCOUNTER — EVALUATION (OUTPATIENT)
Dept: PHYSICAL THERAPY | Facility: CLINIC | Age: 49
End: 2024-03-14
Payer: COMMERCIAL

## 2024-03-14 VITALS
SYSTOLIC BLOOD PRESSURE: 172 MMHG | HEART RATE: 79 BPM | BODY MASS INDEX: 23.05 KG/M2 | WEIGHT: 135 LBS | HEIGHT: 64 IN | DIASTOLIC BLOOD PRESSURE: 107 MMHG

## 2024-03-14 DIAGNOSIS — M54.2 MYOFASCIAL NECK PAIN: ICD-10-CM

## 2024-03-14 DIAGNOSIS — G89.29 CHRONIC LEFT SHOULDER PAIN: ICD-10-CM

## 2024-03-14 DIAGNOSIS — M25.552 LEFT HIP PAIN: ICD-10-CM

## 2024-03-14 DIAGNOSIS — M25.512 CHRONIC LEFT SHOULDER PAIN: ICD-10-CM

## 2024-03-14 DIAGNOSIS — M75.82 TENDINITIS OF LEFT ROTATOR CUFF: Primary | ICD-10-CM

## 2024-03-14 DIAGNOSIS — M70.62 GREATER TROCHANTERIC BURSITIS OF LEFT HIP: Primary | ICD-10-CM

## 2024-03-14 PROCEDURE — 97161 PT EVAL LOW COMPLEX 20 MIN: CPT | Performed by: PHYSICAL THERAPIST

## 2024-03-14 PROCEDURE — 99213 OFFICE O/P EST LOW 20 MIN: CPT | Performed by: ORTHOPAEDIC SURGERY

## 2024-03-14 PROCEDURE — 73030 X-RAY EXAM OF SHOULDER: CPT

## 2024-03-14 NOTE — PROGRESS NOTES
Orthopedic Sports Medicine New Patient Visit     Assesment:   48 y.o. female with myofascial neck pain and rotator cuff tendinitis of the left shoulder    Plan:    Cherie is a pleasant 48 y.o. female who presents for initial evaluation of the left shoulder today. After obtaining a thorough history, orthopedic exam, and reviewing imaging I believe her symptoms are consistent with myofascial neck pain and rotator cuff tendinitis of the left shoulder. I recommend physical therapy for the myofascial neck pain and rotator cuff tendinitis. I believe she will benefit greatly from physical therapy alone.  We did discuss options for injections as well.  Will hold off on this at this time and start with physical therapy.  She may apply Voltaren Gel topically to the shoulder. She may apply ice or heat as needed. She was amenable to this plan. She will follow-up in 8 weeks for re-evaluation.        Follow up:    Return in about 8 weeks (around 5/9/2024) for Recheck.        Chief Complaint   Patient presents with    Left Shoulder - Pain       History of Present Illness:    The patient is a 48 y.o., right hand dominant, female, who presents for initial evaluation of the left shoulder today. She was referred to see me by Dr. Slade. She states the left shoulder has bothered her for years, but has been getting worse recently. She indicates her pain is located posteriorly and laterally. She states the pain occasionally radiates down the arm. Reaching overhead, lifting objects, and holding her arm out to the side aggravates her shoulder pain. 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 She feels the shoulder swells occasionally. She reports hearing popping noises from the shoulder when moving it. She fell down the stairs in 2017 and recalls experiencing shoulder pain shortly after.   She takes Tylenol and Advil together for pain control, which she feels helps temporarily.    Shoulder Surgical  History:  None    Past Medical, Social and Family History:  Past Medical History:   Diagnosis Date    Asthma     Disease of thyroid gland     Hypertension      Past Surgical History:   Procedure Laterality Date    TUBAL LIGATION       Allergies   Allergen Reactions    Shellfish-Derived Products - Food Allergy Hives     Current Outpatient Medications on File Prior to Visit   Medication Sig Dispense Refill    albuterol (PROVENTIL HFA,VENTOLIN HFA) 90 mcg/act inhaler Inhale 2 puffs every 6 (six) hours as needed for wheezing      amLODIPine (NORVASC) 10 mg tablet Take 10 mg by mouth daily      amLODIPine (NORVASC) 10 mg tablet Take 1 tablet (10 mg total) by mouth daily 30 tablet 0    fluocinolone acetonide (DermOtic) 0.01 % otic oil Administer 5 drops into both ears 2 (two) times a day 20 mL 0    naproxen (NAPROSYN) 500 mg tablet Take 1 tablet (500 mg total) by mouth 2 (two) times a day with meals for 7 days 14 tablet 0    timolol (TIMOPTIC) 0.5 % ophthalmic solution INSTILL 1 DROP INTO BOTH EYES IN THE MORNING      azelastine (ASTELIN) 0.1 % nasal spray 1 spray into each nostril 2 (two) times a day Use in each nostril as directed 30 mL 3    cyclobenzaprine (FLEXERIL) 10 mg tablet Take 10 mg by mouth daily as needed (Patient not taking: Reported on 10/30/2023)      fluticasone (FLONASE) 50 mcg/act nasal spray 1 spray into each nostril daily (Patient not taking: Reported on 10/30/2023) 16 g 0    lisinopril (ZESTRIL) 10 mg tablet Take 10 mg by mouth daily (Patient not taking: Reported on 10/30/2023)      methimazole (TAPAZOLE) 10 mg tablet Take 10 mg by mouth in the morning (Patient not taking: Reported on 3/14/2024)      naproxen (NAPROSYN) 500 mg tablet Take 1 tablet (500 mg total) by mouth 2 (two) times a day with meals for 7 days 14 tablet 0     No current facility-administered medications on file prior to visit.     Social History     Socioeconomic History    Marital status: /Civil Union     Spouse name: Not  "on file    Number of children: Not on file    Years of education: Not on file    Highest education level: Not on file   Occupational History    Not on file   Tobacco Use    Smoking status: Every Day     Current packs/day: 0.50     Types: Cigarettes    Smokeless tobacco: Never   Vaping Use    Vaping status: Never Used   Substance and Sexual Activity    Alcohol use: Not Currently    Drug use: Yes     Types: Marijuana    Sexual activity: Not on file   Other Topics Concern    Not on file   Social History Narrative    Not on file     Social Determinants of Health     Financial Resource Strain: Not on file   Food Insecurity: Not on file   Transportation Needs: Not on file   Physical Activity: Not on file   Stress: Not on file   Social Connections: Not on file   Intimate Partner Violence: Not on file   Housing Stability: Not on file         I have reviewed the past medical, surgical, social and family history, medications and allergies as documented in the EMR.    Review of systems: ROS is negative other than that noted in the HPI.  Constitutional: Negative for fatigue and fever.   HENT: Negative for sore throat.    Respiratory: Negative for shortness of breath.    Cardiovascular: Negative for chest pain.   Gastrointestinal: Negative for abdominal pain.   Endocrine: Negative for cold intolerance and heat intolerance.   Genitourinary: Negative for flank pain.   Musculoskeletal: Negative for back pain.   Skin: Negative for rash.   Allergic/Immunologic: Negative for immunocompromised state.   Neurological: Negative for dizziness.   Psychiatric/Behavioral: Negative for agitation.      Physical Exam:    Blood pressure (!) 172/107, pulse 79, height 5' 4\" (1.626 m), weight 61.2 kg (135 lb), last menstrual period 02/14/2024.    General/Constitutional: NAD, well developed, well nourished  HENT: Normocephalic, atraumatic  CV: Intact distal pulses, regular rate  Resp: No respiratory distress or labored breathing  Abdomen: soft, " nondistended, non tender   Lymphatic: No lymphadenopathy palpated  Neuro: Alert and Oriented x 3, no focal deficits  Psych: Normal mood, normal affect  Skin: Warm, dry, no rashes, no erythema      Shoulder Exam:      Inspection: No ecchymosis, edema, or deformity. No visualized wounds or skin lesions   Palpation: paraspinal muscle tenderness, AC joint tenderness, trapezius tenderness, and biceps tenderness  Active Motion:       FF: 160° with pain        ER: 70°        IR: T12  Strength: 4+/5 empty can, 5/5 ER,  5/5 IR with pain  Sensory - SILT in the Radial / Ulnar / Median / Axillary nerve distributions  Motor - AIN / PIN / Radial / Ulnar / Median / Axillary motor nerves in tact  Palpable Radial pulse  Cap refill <2secs in all digits        Imaging    I reviewed and interpreted X-rays of the left shoulder which show minimal degenerative changes at the glenohumeral joint. Moderate joint space narrowing at the acromioclavicular joint. No acute fractures or dislocations.    Scribe Attestation      I,:  Mabel Beck am acting as a scribe while in the presence of the attending physician.:       I,:  Be Uriarte MD personally performed the services described in this documentation    as scribed in my presence.:

## 2024-03-14 NOTE — PROGRESS NOTES
PT Evaluation     Today's date: 3/14/2024  Patient name: Cherie Arias  : 1975  MRN: [id number]  Referring provider: Melquiades Slade DO  Dx:   Encounter Diagnosis     ICD-10-CM    1. Greater trochanteric bursitis of left hip  M70.62 Ambulatory Referral to Physical Therapy      2. Left hip pain  M25.552 Ambulatory Referral to Physical Therapy                     Assessment  Assessment details: Cherie Arias is a 47 yo female who presents to [facility] physical therapy with a chief complaint of L hip pain radiating from the lower back down the L leg that is negatively impacting her quality of life. Sitting posture with significant R sided lean present as well as ataxic gait. Repeated lumbar flexion increased pain and peripheralized symptoms in the L leg. Repeated extension centralized radicular symptoms and increased lower back pain. Decreased hip strength on the L with pain. L sided pelvic up slight noted on the L corrected with prolonged closed pack traction and grade 5 mobilization. Patient was educated on icing to reduce inflammation, correcting posture when sitting, avoiding repetitive lumbar flexion as well as avoiding holding grandchildren on her hips. Patient would benefit from continued PT services to improve her quality of life and functional mobility.  Impairments: abnormal coordination, abnormal gait, abnormal muscle tone, abnormal or restricted ROM, abnormal movement, activity intolerance, impaired physical strength, pain with function, weight-bearing intolerance and poor posture   Functional limitations: Sitting, walking, bending down  Symptom irritability: highUnderstanding of Dx/Px/POC: good   Prognosis: good    Goals  Short term goals: (3 weeks)  - Patient will be able to sit longer than 10 minutes without an increase in pain  - Patient will be able to walk around her block to return to OF before her fall  - Patient will be able to lie longer than 4 hours to improve sleep   - Patient  will be independent with initial HEP    Long term goals: (6 weeks)  - Patient will be able to lift up grandchildren without an increase in pain   - Patient will be independent with comprehensive HEP  - Patient will have 0/10 pain   - Patient will centralize radicular pain to low back region     Plan  Patient would benefit from: skilled physical therapy  Planned modality interventions: cryotherapy and traction  Planned therapy interventions: abdominal trunk stabilization, activity modification, ADL retraining, balance/weight bearing training, behavior modification, body mechanics training, flexibility, functional ROM exercises, gait training, home exercise program, joint mobilization, manual therapy, massage, motor coordination training, nerve gliding, neuromuscular re-education, patient education, postural training, stretching, strengthening, therapeutic activities, therapeutic exercise and therapeutic training  Frequency: 2x week  Duration in visits: 12  Duration in weeks: 6  Plan of Care beginning date: 3/14/2024  Plan of Care expiration date: 2024  Treatment plan discussed with: patient    Subjective Evaluation    History of Present Illness  Mechanism of injury: trauma  Mechanism of injury: 7 years ago patient reports falling down the stairs. She noticed pain at first on her life side that subsided. At the time, pt had X-rays that had negative findings. In the last two years she has constant pain in her left hip. She noticed that her L hip is raised, swollen, left glute is swollen, back pain, and radiating pain down her L leg and into her groin area. Radiating pain down the L leg and ends at the lateral calf.           Recurrent probem    Quality of life: fair    Patient Goals  Patient goals for therapy: independence with ADLs/IADLs, return to sport/leisure activities, decreased pain, increased motion and return to work    Pain  Current pain ratin  At best pain rating: 3  At worst pain rating:  8  Location: L lateral hip, small of lower back  Quality: discomfort, sharp, radiating and throbbing  Relieving factors: medications (tiger balm ointment, advil)  Aggravating factors: standing, sitting and walking    Social Support  Steps to enter house: yes  Stairs in house: yes (3 flights)   Lives in: multiple-level home  Lives with: adult children and young children    Employment status: working (customer service)  Hand dominance: right  Exercise history: Used to enjoy walking, bike riding, kick boxing- stopped since pain        Objective     Tenderness     Left Hip   Tenderness in the greater trochanter.     Active Range of Motion     Lumbar   Flexion:  WFL and with pain  Extension:  Restriction level: minimal    Passive Range of Motion     Additional Passive Range of Motion Details  Hip flexion -  left = 74 deg,  right = 109  Mechanical Assessment    Cervical      Thoracic      Lumbar    Standing flexion: repeated movements   Pain location:peripheralized  Pain intensity: worse  Standing extension: repeated movements  Pain location: centralized  Pain intensity: better    Strength/Myotome Testing     Left Hip   Planes of Motion   Flexion: 3- (pain)    Right Hip   Planes of Motion   Flexion: 3+    Left Knee   Flexion: 4+  Extension: 4+    Right Knee   Flexion: 3+  Extension: 3+ (pain)    Left Ankle/Foot   Dorsiflexion: 4+  Plantar flexion: 4+    Right Ankle/Foot   Dorsiflexion: 4  Plantar flexion: 4    Ambulation     Observational Gait   Gait: antalgic     Quality of Movement During Gait   Trunk  Forward lean.     General Comments:      Lumbar Comments  ASIS, ischial crest and greater trochanter palpation revealed pelvic up slip on L   Leg length slightly shorter on L  Seated position: Body weight shift entirely onto R hip with upper trunk lean on R arm rest                 Eval/ Re-eval Auth #/ Referral # Total visits Start date  Expiration date Total active units  Total manual units  PT only or  PT+OT?   3/14                                      3/14              Total units authorized                Total units remaining                    Precautions: Hypertension, Asthma            Specialty Daily Treatment Diary     Manuals 3/14/24       Visit # 1       STM QL paraspinals         Stretch  Quad psoas        Leg traction L LE, two minutes, grade 5               Warm-up        NuStep        Neuro Re-Ed        Gset prone        TA brace        Pallof press                        Ther Ex        Mini squat        Side step        Knee ext        CECILIA        Clamshell        Backbends 10               Ther Activity                                Modalities        MH

## 2024-03-18 ENCOUNTER — OFFICE VISIT (OUTPATIENT)
Dept: PHYSICAL THERAPY | Facility: CLINIC | Age: 49
End: 2024-03-18
Payer: COMMERCIAL

## 2024-03-18 DIAGNOSIS — M70.62 GREATER TROCHANTERIC BURSITIS OF LEFT HIP: Primary | ICD-10-CM

## 2024-03-18 DIAGNOSIS — M25.552 LEFT HIP PAIN: ICD-10-CM

## 2024-03-18 PROCEDURE — 97110 THERAPEUTIC EXERCISES: CPT | Performed by: PHYSICAL THERAPIST

## 2024-03-18 PROCEDURE — 97112 NEUROMUSCULAR REEDUCATION: CPT | Performed by: PHYSICAL THERAPIST

## 2024-03-18 NOTE — PROGRESS NOTES
Daily Note     Today's date: 3/18/2024  Patient name: Cherie Arias  : 1975  MRN: [id number]  Referring provider: Melquiades Slade DO  Dx:   Encounter Diagnosis     ICD-10-CM    1. Greater trochanteric bursitis of left hip  M70.62       2. Left hip pain  M25.552                      Subjective: She reports 5/10 pain today      Objective: See treatment diary below      Assessment: Tolerated treatment well. Patient would benefit from continued PT to develop core stabs.  She had even leg length following eval but presents today with shortened left leg.  Leg pull corrected this.      Plan: Continue per plan of care.         Eval/ Re-eval Auth #/ Referral # Total visits Start date  Expiration date Total active units  Total manual units  PT only or  PT+OT?   3/14                                     3/14 3/18             Total units authorized               Total units remaining  11 10                   Precautions: Hypertension, Asthma            Specialty Daily Treatment Diary     Manuals 3/14/24 3/18/24      Visit # 1 2      STM QL paraspinals   QL 2 min      Stretch  Quad psoas  HE psoas  4 min      Leg traction L LE, two minutes, grade 5 2 min to left leg              Warm-up        TM  4 min  0.8 mph      Neuro Re-Ed        Gset prone  20      TA brace  20      Pallof press                        Ther Ex        Mini squat  20      Side step  5x10 ft      Knee ext w df  10 ea      CECILIA        Clamshell  20        Backbends 10 3x10              Ther Activity                                Modalities        MH

## 2024-03-19 ENCOUNTER — OFFICE VISIT (OUTPATIENT)
Dept: PHYSICAL THERAPY | Facility: CLINIC | Age: 49
End: 2024-03-19
Payer: COMMERCIAL

## 2024-03-19 DIAGNOSIS — M70.62 GREATER TROCHANTERIC BURSITIS OF LEFT HIP: Primary | ICD-10-CM

## 2024-03-19 DIAGNOSIS — M25.552 LEFT HIP PAIN: ICD-10-CM

## 2024-03-19 PROCEDURE — 97110 THERAPEUTIC EXERCISES: CPT | Performed by: PHYSICAL THERAPIST

## 2024-03-19 PROCEDURE — 97112 NEUROMUSCULAR REEDUCATION: CPT | Performed by: PHYSICAL THERAPIST

## 2024-03-19 NOTE — PROGRESS NOTES
Daily Note     Today's date: 3/19/2024  Patient name: Cherie Arias  : 1975  MRN: [id number]  Referring provider: Melquiades Slade DO  Dx:   Encounter Diagnosis     ICD-10-CM    1. Greater trochanteric bursitis of left hip  M70.62       2. Left hip pain  M25.552                      Subjective: LBP is not bad today.  No eft hip pain.  She notes that she had a good day today.      Objective: See treatment diary below      Assessment: Tolerated treatment well. Patient would benefit from continued PT  Added pallof press this session with no adverse effects.  Extension has been beneficial thus far at centralizing her left hip pain.  She would benefit from continued lumbar stabilization.       Plan: Continue per plan of care.         Eval/ Re-eval Auth #/ Referral # Total visits Start date  Expiration date Total active units  Total manual units  PT only or  PT+OT?   3/14                                     3/14 3/18 3/19            Total units authorized  1/12 2/12 3/12            Total units remaining  11 10 9                  Precautions: Hypertension, Asthma            Specialty Daily Treatment Diary     Manuals 3/14/24 3/18/24 3/19/24     Visit # 1 2 3     STM QL paraspinals   QL 2 min 2 min     Stretch  Quad psoas  HE psoas  4 min 4 min     Leg traction L LE, two minutes, grade 5 2 min to left leg 2 min  bilateral             Warm-up        TM  4 min  0.8 mph 4 min  0.8 mph     Neuro Re-Ed        Gset   20 20     TA brace  20 20     Pallof press   10 ea   YTB                     Ther Ex        Mini squat  20 20     Side step  5x10 ft      Knee ext w df  10 ea 20 ea     CECILIA        Clamshell  20   20  red loop     Backbends 10 3x10 3x10             Ther Activity                                Modalities

## 2024-03-22 ENCOUNTER — HOSPITAL ENCOUNTER (OUTPATIENT)
Dept: RADIOLOGY | Facility: HOSPITAL | Age: 49
Discharge: HOME | End: 2024-03-22
Payer: COMMERCIAL

## 2024-03-22 DIAGNOSIS — J32.9 RECURRENT SINUS INFECTIONS: ICD-10-CM

## 2024-03-22 PROCEDURE — G1004 CDSM NDSC: HCPCS

## 2024-03-22 PROCEDURE — 70486 CT MAXILLOFACIAL W/O DYE: CPT

## 2024-03-26 ENCOUNTER — OFFICE VISIT (OUTPATIENT)
Dept: PHYSICAL THERAPY | Facility: CLINIC | Age: 49
End: 2024-03-26
Payer: COMMERCIAL

## 2024-03-26 DIAGNOSIS — M25.552 LEFT HIP PAIN: ICD-10-CM

## 2024-03-26 DIAGNOSIS — M70.62 GREATER TROCHANTERIC BURSITIS OF LEFT HIP: Primary | ICD-10-CM

## 2024-03-26 PROCEDURE — 97110 THERAPEUTIC EXERCISES: CPT | Performed by: PHYSICAL THERAPIST

## 2024-03-26 PROCEDURE — 97112 NEUROMUSCULAR REEDUCATION: CPT | Performed by: PHYSICAL THERAPIST

## 2024-03-26 NOTE — PROGRESS NOTES
Daily Note     Today's date: 3/26/2024  Patient name: Cherie Arias  : 1975  MRN: [id number]  Referring provider: Melquiades Slade DO  Dx:   Encounter Diagnosis     ICD-10-CM    1. Greater trochanteric bursitis of left hip  M70.62       2. Left hip pain  M25.552                      Subjective: No significant pain today.  She notes tightness anterior left hip.      Objective: See treatment diary below      Assessment: Tolerated treatment well. Patient would benefit from continued PT   Added progress to glute and core strengthening exercises with no increased symptoms.    She has lumbar rotation and extension during quad stretch at 112 degrees knee flexion in prone.      Plan: Continue per plan of care.         Eval/ Re-eval Auth #/ Referral # Total visits Start date  Expiration date Total active units  Total manual units  PT only or  PT+OT?   3/14                                     3/14 3/18 3/19 3/26           Total units authorized  1/12 2/12 3/12 4/12           Total units remaining  11 10 9 8                 Precautions: Hypertension, Asthma            Specialty Daily Treatment Diary     Manuals 3/14/24 3/18/24 3/19/24 3/26/24    Visit # 1 2 3 4    STM QL paraspinals   QL 2 min 2 min 2 min    Stretch  Quad psoas  HE psoas  4 min 4 min 4 min    Leg traction L LE, two minutes, grade 5 2 min to left leg 2 min  bilateral 2 min        Anterior hip mobs 2 min    Warm-up        TM  4 min  0.8 mph 4 min  0.8 mph 4 min  0.8 mph    Neuro Re-Ed        Gset   20 20 20  bridges    TA brace  20 20 20 w march    Pallof press   10 ea   YTB                     Ther Ex        Mini squat  20 20 20    Side step  5x10 ft      Knee ext w df  10 ea 20 ea 20 ea    CECILIA        Clamshell  20   20  red loop 20  blk loop    Backbends 10 3x10 3x10 3x10            Ther Activity                                Modalities

## 2024-04-05 ENCOUNTER — TELEPHONE (OUTPATIENT)
Dept: VASCULAR SURGERY | Facility: CLINIC | Age: 49
End: 2024-04-05

## 2024-04-09 ENCOUNTER — TELEPHONE (OUTPATIENT)
Dept: VASCULAR SURGERY | Facility: CLINIC | Age: 49
End: 2024-04-09

## 2024-04-13 ENCOUNTER — APPOINTMENT (EMERGENCY)
Dept: RADIOLOGY | Facility: HOSPITAL | Age: 49
End: 2024-04-13
Payer: COMMERCIAL

## 2024-04-13 ENCOUNTER — HOSPITAL ENCOUNTER (EMERGENCY)
Facility: HOSPITAL | Age: 49
Discharge: HOME | End: 2024-04-13
Attending: EMERGENCY MEDICINE
Payer: COMMERCIAL

## 2024-04-13 VITALS
HEART RATE: 69 BPM | DIASTOLIC BLOOD PRESSURE: 80 MMHG | TEMPERATURE: 98 F | RESPIRATION RATE: 22 BRPM | SYSTOLIC BLOOD PRESSURE: 148 MMHG | OXYGEN SATURATION: 99 %

## 2024-04-13 DIAGNOSIS — K86.2 PANCREATIC CYST: ICD-10-CM

## 2024-04-13 DIAGNOSIS — R10.9 ABDOMINAL PAIN: ICD-10-CM

## 2024-04-13 DIAGNOSIS — M54.9 BACK PAIN: Primary | ICD-10-CM

## 2024-04-13 LAB
ALBUMIN SERPL BCP-MCNC: 4.4 G/DL (ref 3.5–5)
ALP SERPL-CCNC: 69 U/L (ref 34–104)
ALT SERPL W P-5'-P-CCNC: 16 U/L (ref 7–52)
ANION GAP SERPL CALCULATED.3IONS-SCNC: 6 MMOL/L (ref 4–13)
AST SERPL W P-5'-P-CCNC: 13 U/L (ref 13–39)
BASOPHILS # BLD AUTO: 0.07 THOUSANDS/ÂΜL (ref 0–0.1)
BASOPHILS NFR BLD AUTO: 1 % (ref 0–1)
BILIRUB SERPL-MCNC: 0.32 MG/DL (ref 0.2–1)
BILIRUB UR QL STRIP: NEGATIVE
BUN SERPL-MCNC: 13 MG/DL (ref 5–25)
CALCIUM SERPL-MCNC: 9 MG/DL (ref 8.4–10.2)
CHLORIDE SERPL-SCNC: 107 MMOL/L (ref 96–108)
CLARITY UR: CLEAR
CO2 SERPL-SCNC: 29 MMOL/L (ref 21–32)
COLOR UR: NORMAL
CREAT SERPL-MCNC: 0.9 MG/DL (ref 0.6–1.3)
EOSINOPHIL # BLD AUTO: 0.36 THOUSAND/ÂΜL (ref 0–0.61)
EOSINOPHIL NFR BLD AUTO: 3 % (ref 0–6)
ERYTHROCYTE [DISTWIDTH] IN BLOOD BY AUTOMATED COUNT: 13.4 % (ref 11.6–15.1)
GFR SERPL CREATININE-BSD FRML MDRD: 75 ML/MIN/1.73SQ M
GLUCOSE SERPL-MCNC: 93 MG/DL (ref 65–140)
GLUCOSE UR STRIP-MCNC: NEGATIVE MG/DL
HCT VFR BLD AUTO: 41.9 % (ref 34.8–46.1)
HGB BLD-MCNC: 13.5 G/DL (ref 11.5–15.4)
HGB UR QL STRIP.AUTO: NEGATIVE
IMM GRANULOCYTES # BLD AUTO: 0.02 THOUSAND/UL (ref 0–0.2)
IMM GRANULOCYTES NFR BLD AUTO: 0 % (ref 0–2)
KETONES UR STRIP-MCNC: NEGATIVE MG/DL
LEUKOCYTE ESTERASE UR QL STRIP: NEGATIVE
LIPASE SERPL-CCNC: 69 U/L (ref 11–82)
LYMPHOCYTES # BLD AUTO: 2.82 THOUSANDS/ÂΜL (ref 0.6–4.47)
LYMPHOCYTES NFR BLD AUTO: 27 % (ref 14–44)
MCH RBC QN AUTO: 32.4 PG (ref 26.8–34.3)
MCHC RBC AUTO-ENTMCNC: 32.2 G/DL (ref 31.4–37.4)
MCV RBC AUTO: 101 FL (ref 82–98)
MONOCYTES # BLD AUTO: 1.21 THOUSAND/ÂΜL (ref 0.17–1.22)
MONOCYTES NFR BLD AUTO: 11 % (ref 4–12)
NEUTROPHILS # BLD AUTO: 6.11 THOUSANDS/ÂΜL (ref 1.85–7.62)
NEUTS SEG NFR BLD AUTO: 58 % (ref 43–75)
NITRITE UR QL STRIP: NEGATIVE
NRBC BLD AUTO-RTO: 0 /100 WBCS
PH UR STRIP.AUTO: 6 [PH]
PLATELET # BLD AUTO: 221 THOUSANDS/UL (ref 149–390)
PMV BLD AUTO: 9.8 FL (ref 8.9–12.7)
POTASSIUM SERPL-SCNC: 3.4 MMOL/L (ref 3.5–5.3)
PROT SERPL-MCNC: 7.8 G/DL (ref 6.4–8.4)
PROT UR STRIP-MCNC: NEGATIVE MG/DL
RBC # BLD AUTO: 4.17 MILLION/UL (ref 3.81–5.12)
SODIUM SERPL-SCNC: 142 MMOL/L (ref 135–147)
SP GR UR STRIP.AUTO: <=1.005 (ref 1–1.03)
UROBILINOGEN UR QL STRIP.AUTO: 0.2 E.U./DL
WBC # BLD AUTO: 10.59 THOUSAND/UL (ref 4.31–10.16)

## 2024-04-13 PROCEDURE — 81003 URINALYSIS AUTO W/O SCOPE: CPT | Performed by: EMERGENCY MEDICINE

## 2024-04-13 PROCEDURE — 36415 COLL VENOUS BLD VENIPUNCTURE: CPT | Performed by: EMERGENCY MEDICINE

## 2024-04-13 PROCEDURE — 99285 EMERGENCY DEPT VISIT HI MDM: CPT | Performed by: EMERGENCY MEDICINE

## 2024-04-13 PROCEDURE — 80053 COMPREHEN METABOLIC PANEL: CPT | Performed by: EMERGENCY MEDICINE

## 2024-04-13 PROCEDURE — 96375 TX/PRO/DX INJ NEW DRUG ADDON: CPT

## 2024-04-13 PROCEDURE — 85025 COMPLETE CBC W/AUTO DIFF WBC: CPT | Performed by: EMERGENCY MEDICINE

## 2024-04-13 PROCEDURE — 74177 CT ABD & PELVIS W/CONTRAST: CPT

## 2024-04-13 PROCEDURE — 96365 THER/PROPH/DIAG IV INF INIT: CPT

## 2024-04-13 PROCEDURE — 99283 EMERGENCY DEPT VISIT LOW MDM: CPT

## 2024-04-13 PROCEDURE — 83690 ASSAY OF LIPASE: CPT | Performed by: EMERGENCY MEDICINE

## 2024-04-13 RX ORDER — ACETAMINOPHEN 10 MG/ML
1000 INJECTION, SOLUTION INTRAVENOUS ONCE
Status: COMPLETED | OUTPATIENT
Start: 2024-04-13 | End: 2024-04-13

## 2024-04-13 RX ORDER — MORPHINE SULFATE 10 MG/ML
6 INJECTION, SOLUTION INTRAMUSCULAR; INTRAVENOUS ONCE
Status: COMPLETED | OUTPATIENT
Start: 2024-04-13 | End: 2024-04-13

## 2024-04-13 RX ORDER — ONDANSETRON 2 MG/ML
4 INJECTION INTRAMUSCULAR; INTRAVENOUS ONCE
Status: COMPLETED | OUTPATIENT
Start: 2024-04-13 | End: 2024-04-13

## 2024-04-13 RX ADMIN — ONDANSETRON 4 MG: 2 INJECTION INTRAMUSCULAR; INTRAVENOUS at 05:54

## 2024-04-13 RX ADMIN — MORPHINE SULFATE 6 MG: 10 INJECTION INTRAVENOUS at 05:54

## 2024-04-13 RX ADMIN — IOHEXOL 100 ML: 350 INJECTION, SOLUTION INTRAVENOUS at 06:58

## 2024-04-13 RX ADMIN — ACETAMINOPHEN 1000 MG: 10 INJECTION INTRAVENOUS at 06:41

## 2024-04-13 NOTE — DISCHARGE INSTRUCTIONS
"You have been evaluated in the Emergency Department today for abdominal pain and back paiun. Your evaluation was not suggestive of any emergent condition requiring medical intervention at this time. However, some abdominal problems make take more time to appear. Therefore, it is important for you to watch for any new symptoms or worsening of your current condition.      Please follow up with your primary care physician as soon as possible. If you do not have a primary doctor, you can call \"In[facility]\" to schedule an appointment with one.     Your CT scan incidentally showed the following:      \"Incidentally discovered tiny pancreatic tail cyst. Based on institutional consensus and radiology literature, recommendation is for contrast-enhanced MRI/MRCP to be performed 1 year from this examination.\"    You will need follow-up for this finding.  Please follow-up with your family doctor and/or gastroenterology as soon as possible in order to schedule your MRI/MRCP.    Return to the Emergency Department if you experience worsening or uncontrolled pain, fevers 100.4°F or greater, recurrent vomiting, inability to tolerate food or fluids by mouth, bloody stools or vomit, black or tarry stools, or any other concerning symptoms.  "

## 2024-04-13 NOTE — ED PROVIDER NOTES
"Final Diagnosis:  1. Back pain    2. Abdominal pain    3. Pancreatic cyst        No chief complaint on file.      HPI  Patient reports pain starting in the night. Attributes to eating \"pop rocks\" in the night. Goes from back through to abd. No trauma to back. No focal neuro. Intermittently seeing blood in urine. No dysuria. Nausea w/o vomiting. No stool change. No vaginal d/c bleeding. Not binge drinking. No prior abd surgery but tubal     EMS additionally reports:     - Previous charting underwent limited review with attention to last ED visits, labs, ekgs, and prior imaging.  Chart review reveals :     No results found for any previous visit.       - No language barrier.   - History obtained from patient    - Discuss patient's care, with patient permission or by chart review, with      PMH:   has a past medical history of Asthma, Disease of thyroid gland, and Hypertension.    PSH:   has a past surgical history that includes Tubal ligation.     Social History:  Tobacco Use: High Risk (4/13/2024)    Patient History     Smoking Tobacco Use: Every Day     Smokeless Tobacco Use: Never     Passive Exposure: Not on file     Alcohol Use: Not on file     No illicit use       ROS:  Pertinent positives/negatives: .     Some ROS may be present in the HPI and would take precedent over these standard questions asked below.   Review of Systems   Gastrointestinal:  Positive for abdominal pain and nausea. Negative for blood in stool, constipation, diarrhea and vomiting.   Genitourinary:  Positive for flank pain and hematuria.   Musculoskeletal:  Positive for back pain. Negative for gait problem.   Neurological:  Negative for weakness and numbness.        CONSTITUTIONAL:  No lethargy. No unexpected weight loss. No change in behavior.  EYES:  No pain, redness, or discharge. No loss of vision. No orbital trauma or pain.   ENT:  No tinnitus or decreased hearing. No epistaxis/purulent rhinorrhea. No voice change, airway closing, " trismus.   CARDIOVASCULAR:  No chest pain. No skin mottling or pallor. No change in exertional capacity  RESPIRATORY:  No hemoptysis. No paroxysmal nocturnal dyspnea. No stridor. No apnea or bluing.     MUSCULOSKELETAL:  No contracture.  No new deformity.   INTEGUMENTARY:  No swelling. No unexpected contusions. No abrasions. No lymphangitis.  NEUROLOGIC:  No meningismus. No new numbness of the extremities. No new focal weakness. No postural instability  PSYCHIATRIC:  No SI HI AVH  HEMATOLOGICAL:  No bleeding. No petechiae. No bruising.  ALLERGIES:  No urticaria. No sudden abd cramping. No stridor.    PE:     Physical exam highlights:   Physical Exam       Vitals:    04/13/24 0544 04/13/24 0630 04/13/24 0740   BP: (!) 204/93 146/90 148/80   BP Location: Right arm Right arm Left arm   Pulse: 89 76 69   Resp: 20 (!) 26 22   Temp: 98 °F (36.7 °C)  98 °F (36.7 °C)   TempSrc: Oral     SpO2: 100% 99% 99%     Vitals reviewed by me.   Nursing note reviewed  Chaperone present for all sensitive exam.  Const: No acute distress. Alert. Nontoxic. Not diaphoretic.    HEENT: External ears normal. No protrusion drainage swelling. Nose normal. No drainage/traumatic deformity. MM. Mouth with baseline/symmetric movement. No trismus.   Eyes: No squinting. No icterus. No tearing/swelling/drainage. Tracks through the room with normal EOM.   Neck: ROM normal. No rigidity. No meningismus.  Cards: Rate as per vitals Compared to monitor sinus unless documented. Regular Well perfused.  Pulm: Effort and excursion normal. No distress. No audible wheezing/no stridor. Normal resp rate without retraction or change in work of breathing.  Abd: No distension beyond baseline. No fluctuant wave. Patient without peritoneal pain with shifting/bumping the bed.   MSK: ROM normal baseline. No deformity. No contractures from baseline.   Skin: No new rashes visible. Well perfused. No wounds visualized on exposed skin  Neuro: Nonfocal. Baseline. CN grossly  "intact. Moving all four with coordination.   Psych: Normal behavior and affect.        A:  - Nursing note reviewed.    Ddx and MDM  Considered diagnoses    Hematuria  R/o uti  R/o RCC  R/o bladder mass  R/o ureterolithiasis  UA  CT abd pelv    Other msk back pain  Treat symptomatically    R/o lenore  Hepatic fxn  CT abd pelv    R/o pancreatitis  Lipase    Incidental panc mass  See recs in radiology report            Dispo decision       My conversation with consultant reveals:        Decision rules:                           My read of the XR/CT scan reveals:    CT abdomen pelvis with contrast   Final Result      No urinary tract calculi or hydronephrosis.      Incidentally discovered tiny pancreatic tail cyst. Based on institutional consensus and radiology literature, recommendation is for contrast-enhanced MRI/MRCP to be performed 1 year from this examination.         This examination was marked \"immediate notification\" in Epic in order to begin the standard process by which the radiology reading room liaison alerts the referring practitioner.                  Workstation performed: [id number]             Orders Placed This Encounter   Procedures    CT abdomen pelvis with contrast    CBC and differential    Comprehensive metabolic panel    Lipase    UA (URINE) with reflex to Scope     Labs Reviewed   CBC AND DIFFERENTIAL - Abnormal       Result Value Ref Range Status    WBC 10.59 (*) 4.31 - 10.16 Thousand/uL Final    RBC 4.17  3.81 - 5.12 Million/uL Final    Hemoglobin 13.5  11.5 - 15.4 g/dL Final    Hematocrit 41.9  34.8 - 46.1 % Final     (*) 82 - 98 fL Final    MCH 32.4  26.8 - 34.3 pg Final    MCHC 32.2  31.4 - 37.4 g/dL Final    RDW 13.4  11.6 - 15.1 % Final    MPV 9.8  8.9 - 12.7 fL Final    Platelets 221  149 - 390 Thousands/uL Final    nRBC 0  /100 WBCs Final    Segmented % 58  43 - 75 % Final    Immature Grans % 0  0 - 2 % Final    Lymphocytes % 27  14 - 44 % Final    Monocytes % 11  4 - 12 % Final "    Eosinophils Relative 3  0 - 6 % Final    Basophils Relative 1  0 - 1 % Final    Absolute Neutrophils 6.11  1.85 - 7.62 Thousands/µL Final    Absolute Immature Grans 0.02  0.00 - 0.20 Thousand/uL Final    Absolute Lymphocytes 2.82  0.60 - 4.47 Thousands/µL Final    Absolute Monocytes 1.21  0.17 - 1.22 Thousand/µL Final    Eosinophils Absolute 0.36  0.00 - 0.61 Thousand/µL Final    Basophils Absolute 0.07  0.00 - 0.10 Thousands/µL Final   COMPREHENSIVE METABOLIC PANEL - Abnormal    Sodium 142  135 - 147 mmol/L Final    Potassium 3.4 (*) 3.5 - 5.3 mmol/L Final    Chloride 107  96 - 108 mmol/L Final    CO2 29  21 - 32 mmol/L Final    ANION GAP 6  4 - 13 mmol/L Final    BUN 13  5 - 25 mg/dL Final    Creatinine 0.90  0.60 - 1.30 mg/dL Final    Comment: Standardized to IDMS reference method    Glucose 93  65 - 140 mg/dL Final    Comment: If the patient is fasting, the ADA then defines impaired fasting glucose as > 100 mg/dL and diabetes as > or equal to 123 mg/dL.    Calcium 9.0  8.4 - 10.2 mg/dL Final    AST 13  13 - 39 U/L Final    ALT 16  7 - 52 U/L Final    Comment: Specimen collection should occur prior to Sulfasalazine administration due to the potential for falsely depressed results.     Alkaline Phosphatase 69  34 - 104 U/L Final    Total Protein 7.8  6.4 - 8.4 g/dL Final    Albumin 4.4  3.5 - 5.0 g/dL Final    Total Bilirubin 0.32  0.20 - 1.00 mg/dL Final    Comment: Use of this assay is not recommended for patients undergoing treatment with eltrombopag due to the potential for falsely elevated results.  N-acetyl-p-benzoquinone imine (metabolite of Acetaminophen) will generate erroneously low results in samples for patients that have taken an overdose of Acetaminophen.    eGFR 75  ml/min/1.73sq m Final    Narrative:     National Kidney Disease Foundation guidelines for Chronic Kidney Disease (CKD):     Stage 1 with normal or high GFR (GFR > 90 mL/min/1.73 square meters)    Stage 2 Mild CKD (GFR = 60-89  mL/min/1.73 square meters)    Stage 3A Moderate CKD (GFR = 45-59 mL/min/1.73 square meters)    Stage 3B Moderate CKD (GFR = 30-44 mL/min/1.73 square meters)    Stage 4 Severe CKD (GFR = 15-29 mL/min/1.73 square meters)    Stage 5 End Stage CKD (GFR <15 mL/min/1.73 square meters)  Note: GFR calculation is accurate only with a steady state creatinine   LIPASE - Normal    Lipase 69  11 - 82 u/L Final       *Each of these labs was reviewed. Particular standout labs will be noted in the ED Course above     Final Diagnosis:  1. Back pain    2. Abdominal pain    3. Pancreatic cyst          P:  - hospital tx includes   Medications   morphine injection 6 mg (6 mg Intravenous Given 4/13/24 0554)   ondansetron (ZOFRAN) injection 4 mg (4 mg Intravenous Given 4/13/24 0554)   acetaminophen (Ofirmev) injection 1,000 mg (0 mg Intravenous Stopped 4/13/24 0733)   iohexol (OMNIPAQUE) 350 MG/ML injection (MULTI-DOSE) 100 mL (100 mL Intravenous Given 4/13/24 0658)         - disposition  Time reflects when diagnosis was documented in both MDM as applicable and the Disposition within this note       Time User Action Codes Description Comment    4/13/2024  7:22 AM Yifan Sam [M54.9] Back pain     4/13/2024  7:22 AM Yifan Sam [R10.9] Abdominal pain     4/13/2024  7:22 AM Yifan Sam [K86.2] Pancreatic cyst           ED Disposition       ED Disposition   Discharge    Condition   Stable    Date/Time   Sat Apr 13, 2024  7:21 AM    Comment   Cherie Arias discharge to home/self care.                   Follow-up Information       Follow up With Specialties Details Why Contact Info Additional Information    [facility]   [address] [place] [address] [facility], [address]  [facility] [address] [phone]       [facility] Emergency Department Emergency Medicine   [address] [facility] [id number] [phone] [facility] Emergency Department, [address]  [place], [place], [address]    Infolink  Call in 1 day  [phone]       [facility] Schedule an appointment as soon as possible for a visit   [address]  [facility] [address] [phone] [facility], [address] [facility], [facility] [address], [place], [place], [address] [phone]            - patient will call their PCP to let them know they were in the emergency department. We discuss return precautions and patient is agreeable with plan and aformentioned disposition.       - additional treatment intended, if consistent with primary provider:  - patient to follow with :      Discharge Medication List as of 4/13/2024  7:23 AM        CONTINUE these medications which have NOT CHANGED    Details   albuterol (PROVENTIL HFA,VENTOLIN HFA) 90 mcg/act inhaler Inhale 2 puffs every 6 (six) hours as needed for wheezing, Historical Med      !! amLODIPine (NORVASC) 10 mg tablet Take 10 mg by mouth daily, Historical Med      !! amLODIPine (NORVASC) 10 mg tablet Take 1 tablet (10 mg total) by mouth daily, Starting Wed 2/21/2024, Normal      azelastine (ASTELIN) 0.1 % nasal spray 1 spray into each nostril 2 (two) times a day Use in each nostril as directed, Starting Mon 10/30/2023, Until Wed 11/29/2023, Normal      cyclobenzaprine (FLEXERIL) 10 mg tablet Take 10 mg by mouth daily as needed, Starting Fri 9/22/2023, Historical Med      fluocinolone acetonide (DermOtic) 0.01 % otic oil Administer 5 drops into both ears 2 (two) times a day, Starting Mon 10/30/2023, Normal      fluticasone (FLONASE) 50 mcg/act nasal spray 1 spray into each nostril daily, Starting Wed 7/19/2023, Normal      lisinopril (ZESTRIL) 10 mg tablet Take 10 mg by mouth daily, Starting Tue 8/29/2023, Historical Med      methimazole (TAPAZOLE) 10 mg tablet Take 10 mg by mouth in the morning, Historical Med      naproxen (NAPROSYN) 500  mg tablet Take 1 tablet (500 mg total) by mouth 2 (two) times a day with meals for 7 days, Starting 2024, Until Thu 3/14/2024, Normal      timolol (TIMOPTIC) 0.5 % ophthalmic solution INSTILL 1 DROP INTO BOTH EYES IN THE MORNING, Historical Med       !! - Potential duplicate medications found. Please discuss with provider.        No discharge procedures on file.  Prior to Admission Medications   Prescriptions Last Dose Informant Patient Reported? Taking?   albuterol (PROVENTIL HFA,VENTOLIN HFA) 90 mcg/act inhaler   Yes No   Sig: Inhale 2 puffs every 6 (six) hours as needed for wheezing   amLODIPine (NORVASC) 10 mg tablet   Yes No   Sig: Take 10 mg by mouth daily   amLODIPine (NORVASC) 10 mg tablet   No No   Sig: Take 1 tablet (10 mg total) by mouth daily   azelastine (ASTELIN) 0.1 % nasal spray   No No   Si spray into each nostril 2 (two) times a day Use in each nostril as directed   cyclobenzaprine (FLEXERIL) 10 mg tablet   Yes No   Sig: Take 10 mg by mouth daily as needed   Patient not taking: Reported on 10/30/2023   fluocinolone acetonide (DermOtic) 0.01 % otic oil   No No   Sig: Administer 5 drops into both ears 2 (two) times a day   fluticasone (FLONASE) 50 mcg/act nasal spray   No No   Si spray into each nostril daily   Patient not taking: Reported on 10/30/2023   lisinopril (ZESTRIL) 10 mg tablet   Yes No   Sig: Take 10 mg by mouth daily   Patient not taking: Reported on 10/30/2023   methimazole (TAPAZOLE) 10 mg tablet   Yes No   Sig: Take 10 mg by mouth in the morning   Patient not taking: Reported on 3/14/2024   naproxen (NAPROSYN) 500 mg tablet   No No   Sig: Take 1 tablet (500 mg total) by mouth 2 (two) times a day with meals for 7 days   timolol (TIMOPTIC) 0.5 % ophthalmic solution   Yes No   Sig: INSTILL 1 DROP INTO BOTH EYES IN THE MORNING      Facility-Administered Medications: None       Portions of the record may have been created with voice recognition software. Occasional wrong  "word or \"sound a like\" substitutions may have occurred due to the inherent limitations of voice recognition software. Read the chart carefully and recognize, using context, where substitutions have occurred.    Electronically signed by:  MD Aly Lugo MD  04/13/24 4732    "

## 2024-05-06 ENCOUNTER — HOSPITAL ENCOUNTER (OUTPATIENT)
Facility: HOSPITAL | Age: 49
Setting detail: OBSERVATION
Discharge: HOME | End: 2024-05-08
Attending: STUDENT IN AN ORGANIZED HEALTH CARE EDUCATION/TRAINING PROGRAM | Admitting: INTERNAL MEDICINE
Payer: COMMERCIAL

## 2024-05-06 ENCOUNTER — APPOINTMENT (EMERGENCY)
Dept: RADIOLOGY | Facility: HOSPITAL | Age: 49
End: 2024-05-06
Payer: COMMERCIAL

## 2024-05-06 ENCOUNTER — OFFICE VISIT (OUTPATIENT)
Dept: OBGYN CLINIC | Facility: CLINIC | Age: 49
End: 2024-05-06
Payer: COMMERCIAL

## 2024-05-06 VITALS
DIASTOLIC BLOOD PRESSURE: 88 MMHG | WEIGHT: 135 LBS | BODY MASS INDEX: 23.05 KG/M2 | SYSTOLIC BLOOD PRESSURE: 151 MMHG | HEART RATE: 82 BPM | HEIGHT: 64 IN

## 2024-05-06 DIAGNOSIS — Z72.0 NICOTINE ABUSE: ICD-10-CM

## 2024-05-06 DIAGNOSIS — E05.90 HYPERTHYROIDISM: ICD-10-CM

## 2024-05-06 DIAGNOSIS — R29.90 STROKE-LIKE SYMPTOMS: ICD-10-CM

## 2024-05-06 DIAGNOSIS — M54.2 MYOFASCIAL NECK PAIN: Primary | ICD-10-CM

## 2024-05-06 DIAGNOSIS — R42 DIZZINESS: Primary | ICD-10-CM

## 2024-05-06 DIAGNOSIS — R20.0 NUMBNESS: ICD-10-CM

## 2024-05-06 DIAGNOSIS — M54.12 CERVICAL RADICULOPATHY: ICD-10-CM

## 2024-05-06 DIAGNOSIS — M75.82 TENDINITIS OF LEFT ROTATOR CUFF: ICD-10-CM

## 2024-05-06 PROBLEM — I10 HYPERTENSION: Status: ACTIVE | Noted: 2024-05-06

## 2024-05-06 LAB
2HR DELTA HS TROPONIN: 0 NG/L
ALBUMIN SERPL BCP-MCNC: 4.2 G/DL (ref 3.5–5)
ALP SERPL-CCNC: 58 U/L (ref 34–104)
ALT SERPL W P-5'-P-CCNC: 13 U/L (ref 7–52)
ANION GAP SERPL CALCULATED.3IONS-SCNC: 5 MMOL/L (ref 4–13)
AST SERPL W P-5'-P-CCNC: 12 U/L (ref 13–39)
BASOPHILS # BLD AUTO: 0.05 THOUSANDS/ÂΜL (ref 0–0.1)
BASOPHILS NFR BLD AUTO: 1 % (ref 0–1)
BILIRUB SERPL-MCNC: 0.25 MG/DL (ref 0.2–1)
BUN SERPL-MCNC: 9 MG/DL (ref 5–25)
CALCIUM SERPL-MCNC: 9.2 MG/DL (ref 8.4–10.2)
CARDIAC TROPONIN I PNL SERPL HS: 4 NG/L
CARDIAC TROPONIN I PNL SERPL HS: 4 NG/L
CHLORIDE SERPL-SCNC: 108 MMOL/L (ref 96–108)
CO2 SERPL-SCNC: 26 MMOL/L (ref 21–32)
CREAT SERPL-MCNC: 0.63 MG/DL (ref 0.6–1.3)
EOSINOPHIL # BLD AUTO: 0.15 THOUSAND/ÂΜL (ref 0–0.61)
EOSINOPHIL NFR BLD AUTO: 2 % (ref 0–6)
ERYTHROCYTE [DISTWIDTH] IN BLOOD BY AUTOMATED COUNT: 12.8 % (ref 11.6–15.1)
GFR SERPL CREATININE-BSD FRML MDRD: 105 ML/MIN/1.73SQ M
GLUCOSE SERPL-MCNC: 89 MG/DL (ref 65–140)
HCG SERPL QL: NEGATIVE
HCT VFR BLD AUTO: 40.4 % (ref 34.8–46.1)
HGB BLD-MCNC: 13.3 G/DL (ref 11.5–15.4)
IMM GRANULOCYTES # BLD AUTO: 0.01 THOUSAND/UL (ref 0–0.2)
IMM GRANULOCYTES NFR BLD AUTO: 0 % (ref 0–2)
LYMPHOCYTES # BLD AUTO: 2.53 THOUSANDS/ÂΜL (ref 0.6–4.47)
LYMPHOCYTES NFR BLD AUTO: 34 % (ref 14–44)
MCH RBC QN AUTO: 32.3 PG (ref 26.8–34.3)
MCHC RBC AUTO-ENTMCNC: 32.9 G/DL (ref 31.4–37.4)
MCV RBC AUTO: 98 FL (ref 82–98)
MONOCYTES # BLD AUTO: 0.59 THOUSAND/ÂΜL (ref 0.17–1.22)
MONOCYTES NFR BLD AUTO: 8 % (ref 4–12)
NEUTROPHILS # BLD AUTO: 4.09 THOUSANDS/ÂΜL (ref 1.85–7.62)
NEUTS SEG NFR BLD AUTO: 55 % (ref 43–75)
NRBC BLD AUTO-RTO: 0 /100 WBCS
PLATELET # BLD AUTO: 281 THOUSANDS/UL (ref 149–390)
PMV BLD AUTO: 9.6 FL (ref 8.9–12.7)
POTASSIUM SERPL-SCNC: 3.9 MMOL/L (ref 3.5–5.3)
PROT SERPL-MCNC: 7.4 G/DL (ref 6.4–8.4)
RBC # BLD AUTO: 4.12 MILLION/UL (ref 3.81–5.12)
SODIUM SERPL-SCNC: 139 MMOL/L (ref 135–147)
WBC # BLD AUTO: 7.42 THOUSAND/UL (ref 4.31–10.16)

## 2024-05-06 PROCEDURE — 93005 ELECTROCARDIOGRAM TRACING: CPT

## 2024-05-06 PROCEDURE — 83036 HEMOGLOBIN GLYCOSYLATED A1C: CPT | Performed by: NURSE PRACTITIONER

## 2024-05-06 PROCEDURE — 99222 1ST HOSP IP/OBS MODERATE 55: CPT | Performed by: NURSE PRACTITIONER

## 2024-05-06 PROCEDURE — 99284 EMERGENCY DEPT VISIT MOD MDM: CPT

## 2024-05-06 PROCEDURE — 36415 COLL VENOUS BLD VENIPUNCTURE: CPT | Performed by: STUDENT IN AN ORGANIZED HEALTH CARE EDUCATION/TRAINING PROGRAM

## 2024-05-06 PROCEDURE — 99213 OFFICE O/P EST LOW 20 MIN: CPT | Performed by: ORTHOPAEDIC SURGERY

## 2024-05-06 PROCEDURE — 70498 CT ANGIOGRAPHY NECK: CPT

## 2024-05-06 PROCEDURE — 84703 CHORIONIC GONADOTROPIN ASSAY: CPT | Performed by: STUDENT IN AN ORGANIZED HEALTH CARE EDUCATION/TRAINING PROGRAM

## 2024-05-06 PROCEDURE — 80053 COMPREHEN METABOLIC PANEL: CPT | Performed by: STUDENT IN AN ORGANIZED HEALTH CARE EDUCATION/TRAINING PROGRAM

## 2024-05-06 PROCEDURE — 71045 X-RAY EXAM CHEST 1 VIEW: CPT

## 2024-05-06 PROCEDURE — 84484 ASSAY OF TROPONIN QUANT: CPT | Performed by: STUDENT IN AN ORGANIZED HEALTH CARE EDUCATION/TRAINING PROGRAM

## 2024-05-06 PROCEDURE — 99285 EMERGENCY DEPT VISIT HI MDM: CPT | Performed by: STUDENT IN AN ORGANIZED HEALTH CARE EDUCATION/TRAINING PROGRAM

## 2024-05-06 PROCEDURE — 85025 COMPLETE CBC W/AUTO DIFF WBC: CPT | Performed by: STUDENT IN AN ORGANIZED HEALTH CARE EDUCATION/TRAINING PROGRAM

## 2024-05-06 PROCEDURE — 70496 CT ANGIOGRAPHY HEAD: CPT

## 2024-05-06 RX ORDER — ASPIRIN 81 MG/1
81 TABLET, CHEWABLE ORAL DAILY
Status: DISCONTINUED | OUTPATIENT
Start: 2024-05-07 | End: 2024-05-08 | Stop reason: HOSPADM

## 2024-05-06 RX ORDER — ATORVASTATIN CALCIUM 40 MG/1
80 TABLET, FILM COATED ORAL
Status: DISCONTINUED | OUTPATIENT
Start: 2024-05-07 | End: 2024-05-06

## 2024-05-06 RX ORDER — ENOXAPARIN SODIUM 100 MG/ML
40 INJECTION SUBCUTANEOUS DAILY
Status: DISCONTINUED | OUTPATIENT
Start: 2024-05-07 | End: 2024-05-08 | Stop reason: HOSPADM

## 2024-05-06 RX ORDER — NICOTINE 21 MG/24HR
1 PATCH, TRANSDERMAL 24 HOURS TRANSDERMAL DAILY
Status: DISCONTINUED | OUTPATIENT
Start: 2024-05-07 | End: 2024-05-08 | Stop reason: HOSPADM

## 2024-05-06 RX ORDER — ONDANSETRON 2 MG/ML
4 INJECTION INTRAMUSCULAR; INTRAVENOUS EVERY 6 HOURS PRN
Status: DISCONTINUED | OUTPATIENT
Start: 2024-05-06 | End: 2024-05-08 | Stop reason: HOSPADM

## 2024-05-06 RX ORDER — ACETAMINOPHEN 325 MG/1
650 TABLET ORAL EVERY 6 HOURS PRN
Status: DISCONTINUED | OUTPATIENT
Start: 2024-05-06 | End: 2024-05-08 | Stop reason: HOSPADM

## 2024-05-06 RX ORDER — ATORVASTATIN CALCIUM 40 MG/1
80 TABLET, FILM COATED ORAL
Status: DISCONTINUED | OUTPATIENT
Start: 2024-05-06 | End: 2024-05-06

## 2024-05-06 RX ORDER — ATORVASTATIN CALCIUM 40 MG/1
40 TABLET, FILM COATED ORAL
Status: DISCONTINUED | OUTPATIENT
Start: 2024-05-07 | End: 2024-05-08 | Stop reason: HOSPADM

## 2024-05-06 RX ORDER — ASPIRIN 325 MG
325 TABLET ORAL ONCE
Status: COMPLETED | OUTPATIENT
Start: 2024-05-06 | End: 2024-05-06

## 2024-05-06 RX ORDER — TIMOLOL MALEATE 5 MG/ML
1 SOLUTION/ DROPS OPHTHALMIC DAILY
Status: DISCONTINUED | OUTPATIENT
Start: 2024-05-06 | End: 2024-05-08 | Stop reason: HOSPADM

## 2024-05-06 RX ADMIN — ATORVASTATIN CALCIUM 80 MG: 40 TABLET, FILM COATED ORAL at 22:13

## 2024-05-06 RX ADMIN — IOHEXOL 85 ML: 350 INJECTION, SOLUTION INTRAVENOUS at 20:36

## 2024-05-06 RX ADMIN — TIMOLOL MALEATE 1 DROP: 5 SOLUTION OPHTHALMIC at 23:10

## 2024-05-06 RX ADMIN — ASPIRIN 325 MG: 325 TABLET ORAL at 21:57

## 2024-05-06 NOTE — PROGRESS NOTES
Orthopedic Sports Medicine follow-up patient Visit     Assesment:   48 y.o. female with myofascial neck pain, cervical radiculopathy, and left rotator cuff tendinitis    Plan:    Upon examination today, the patient does have well-preserved shoulder motion and strength without pain. She primarily localizes pain to the neck and upper back muscles today such that I believe this is the main source of her pain rather than her shoulder. I recommended the patient start formal PT with emphasis on treating her neck in addition to shoulder mobility/strengthening. The patient will stop by the PT office following this appointment. We did also provide a physician-directed HEP and resistance band for her to use at home. If the patient's neck pain persists or worsens in 6-8 weeks following a course of PT, I recommend that she follow up with Spine and Pain Management for further management. We will plan to follow up with Cherie as needed for her left shoulder. In the meantime, she can continue using Tylenol/Advil, OTC topical medications, and ice/heat as needed for pain control.         Chief Complaint   Patient presents with    Left Shoulder - Follow-up       History of Present Illness:    The patient is a 48 y.o., right hand dominant, female, who presents for follow up evaluation of the left rotator cuff tendinitis and myofascial neck pain. Today, the patient notes symptoms remain unchanged. She did not get contacted by PT, so did not attend any sessions since last visit. The patient did try using voltaren gel without any relief and obtains some relief with Advil/Tylenol as needed. She notes pain is more significant in the neck/upper back muscles with radiation to the anterior shoulder and intermittently into the hand. Pain occurs with sleeping and lifting, though is becoming more constant at rest. She denies new injury/trauma, numbness/tingling, weakness, or difficulty with fine motor movements. The patient was evaluated by  Pain Management in a different region of [place] in the past.    Shoulder Surgical History:  None    Past Medical, Social and Family History:  Past Medical History:   Diagnosis Date    Asthma     Disease of thyroid gland     Hypertension      Past Surgical History:   Procedure Laterality Date    TUBAL LIGATION       Allergies   Allergen Reactions    Shellfish-Derived Products - Food Allergy Hives     Current Outpatient Medications on File Prior to Visit   Medication Sig Dispense Refill    albuterol (PROVENTIL HFA,VENTOLIN HFA) 90 mcg/act inhaler Inhale 2 puffs every 6 (six) hours as needed for wheezing      amLODIPine (NORVASC) 10 mg tablet Take 10 mg by mouth daily      amLODIPine (NORVASC) 10 mg tablet Take 1 tablet (10 mg total) by mouth daily 30 tablet 0    fluocinolone acetonide (DermOtic) 0.01 % otic oil Administer 5 drops into both ears 2 (two) times a day 20 mL 0    timolol (TIMOPTIC) 0.5 % ophthalmic solution INSTILL 1 DROP INTO BOTH EYES IN THE MORNING      azelastine (ASTELIN) 0.1 % nasal spray 1 spray into each nostril 2 (two) times a day Use in each nostril as directed 30 mL 3    cyclobenzaprine (FLEXERIL) 10 mg tablet Take 10 mg by mouth daily as needed (Patient not taking: Reported on 10/30/2023)      fluticasone (FLONASE) 50 mcg/act nasal spray 1 spray into each nostril daily (Patient not taking: Reported on 10/30/2023) 16 g 0    lisinopril (ZESTRIL) 10 mg tablet Take 10 mg by mouth daily (Patient not taking: Reported on 10/30/2023)      methimazole (TAPAZOLE) 10 mg tablet Take 10 mg by mouth in the morning (Patient not taking: Reported on 3/14/2024)      naproxen (NAPROSYN) 500 mg tablet Take 1 tablet (500 mg total) by mouth 2 (two) times a day with meals for 7 days 14 tablet 0     No current facility-administered medications on file prior to visit.     Social History     Socioeconomic History    Marital status: /Civil Union     Spouse name: Not on file    Number of children: Not on file    Years  "of education: Not on file    Highest education level: Not on file   Occupational History    Not on file   Tobacco Use    Smoking status: Every Day     Current packs/day: 1.00     Types: Cigarettes    Smokeless tobacco: Never   Vaping Use    Vaping status: Never Used   Substance and Sexual Activity    Alcohol use: Not Currently    Drug use: Yes     Types: Marijuana    Sexual activity: Not on file   Other Topics Concern    Not on file   Social History Narrative    Not on file     Social Determinants of Health     Financial Resource Strain: Not on file   Food Insecurity: Not on file   Transportation Needs: Not on file   Physical Activity: Not on file   Stress: Not on file   Social Connections: Not on file   Intimate Partner Violence: Not on file   Housing Stability: Not on file         I have reviewed the past medical, surgical, social and family history, medications and allergies as documented in the EMR.    Review of systems: ROS is negative other than that noted in the HPI.  Constitutional: Negative for fatigue and fever.   HENT: Negative for sore throat.    Respiratory: Negative for shortness of breath.    Cardiovascular: Negative for chest pain.   Gastrointestinal: Negative for abdominal pain.   Endocrine: Negative for cold intolerance and heat intolerance.   Genitourinary: Negative for flank pain.   Musculoskeletal: Negative for back pain.   Skin: Negative for rash.   Allergic/Immunologic: Negative for immunocompromised state.   Neurological: Negative for dizziness.   Psychiatric/Behavioral: Negative for agitation.      Physical Exam:    Blood pressure 151/88, pulse 82, height 5' 4\" (1.626 m), weight 61.2 kg (135 lb).    General/Constitutional: NAD, well developed, well nourished  HENT: Normocephalic, atraumatic  CV: Intact distal pulses, regular rate  Resp: No respiratory distress or labored breathing  Abdomen: soft, nondistended, non tender   Lymphatic: No lymphadenopathy palpated  Neuro: Alert and Oriented x " 3, no focal deficits  Psych: Normal mood, normal affect  Skin: Warm, dry, no rashes, no erythema      Shoulder Exam:      Inspection: No ecchymosis, edema, or deformity. No visualized wounds or skin lesions   Palpation: paraspinal/barber-scapular muscle tenderness, upper trapezius muscle tenderness, and biceps tenderness. No cervical midline tenderness  Active Motion:       FF: 160° without pain       ER: 70° without pain       IR: T12 without pain  Strength: 5/5 empty can without pain, 5/5 ER without pain,  5/5 IR without pain  Sensory - SILT in the Radial / Ulnar / Median / Axillary nerve distributions  Motor - AIN / PIN / Radial / Ulnar / Median / Axillary motor nerves in tact  Palpable Radial pulse  Cap refill <2secs in all digits        Imaging    I reviewed and interpreted X-rays of the left shoulder which show minimal degenerative changes at the glenohumeral joint. Moderate joint space narrowing at the acromioclavicular joint. No acute fractures or dislocations.    Scribe Attestation      I,:  Danni Abraham PA-C am acting as a scribe while in the presence of the attending physician.:       I,:  Be Uriarte MD personally performed the services described in this documentation    as scribed in my presence.:

## 2024-05-06 NOTE — Clinical Note
Case was discussed with  and the patient's admission status was agreed to be Admission Status: inpatient status to the service of Dr. Tubbs

## 2024-05-07 ENCOUNTER — APPOINTMENT (OUTPATIENT)
Dept: NON INVASIVE DIAGNOSTICS | Facility: HOSPITAL | Age: 49
End: 2024-05-07
Payer: COMMERCIAL

## 2024-05-07 PROBLEM — J45.909 ASTHMA: Status: ACTIVE | Noted: 2024-05-07

## 2024-05-07 LAB
ANION GAP SERPL CALCULATED.3IONS-SCNC: 6 MMOL/L (ref 4–13)
ATRIAL RATE: 73 BPM
ATRIAL RATE: 73 BPM
BSA FOR ECHO PROCEDURE: 1.66 M2
BUN SERPL-MCNC: 11 MG/DL (ref 5–25)
CALCIUM SERPL-MCNC: 9.1 MG/DL (ref 8.4–10.2)
CHLORIDE SERPL-SCNC: 106 MMOL/L (ref 96–108)
CHOLEST SERPL-MCNC: 142 MG/DL
CO2 SERPL-SCNC: 28 MMOL/L (ref 21–32)
CREAT SERPL-MCNC: 0.66 MG/DL (ref 0.6–1.3)
EST. AVERAGE GLUCOSE BLD GHB EST-MCNC: 97 MG/DL
GFR SERPL CREATININE-BSD FRML MDRD: 104 ML/MIN/1.73SQ M
GLUCOSE SERPL-MCNC: 88 MG/DL (ref 65–140)
HBA1C MFR BLD: 5 %
HDLC SERPL-MCNC: 54 MG/DL
LDLC SERPL CALC-MCNC: 76 MG/DL (ref 0–100)
MAGNESIUM SERPL-MCNC: 2 MG/DL (ref 1.9–2.7)
P AXIS: 63 DEGREES
P AXIS: 63 DEGREES
POTASSIUM SERPL-SCNC: 3.8 MMOL/L (ref 3.5–5.3)
PR INTERVAL: 118 MS
PR INTERVAL: 118 MS
QRS AXIS: 53 DEGREES
QRS AXIS: 56 DEGREES
QRSD INTERVAL: 76 MS
QRSD INTERVAL: 78 MS
QT INTERVAL: 396 MS
QT INTERVAL: 400 MS
QTC INTERVAL: 436 MS
QTC INTERVAL: 440 MS
RA PRESSURE ESTIMATED: 8 MMHG
RV PSP: 25 MMHG
SL CV LV EF: 55
SODIUM SERPL-SCNC: 140 MMOL/L (ref 135–147)
T WAVE AXIS: 41 DEGREES
T WAVE AXIS: 43 DEGREES
T3 SERPL-MCNC: 1.2 NG/ML
T4 FREE SERPL-MCNC: 0.85 NG/DL (ref 0.61–1.12)
TR MAX PG: 17 MMHG
TR PEAK VELOCITY: 2 M/S
TRIGL SERPL-MCNC: 61 MG/DL
TSH SERPL DL<=0.05 MIU/L-ACNC: 0.29 UIU/ML (ref 0.45–4.5)
VENTRICULAR RATE: 73 BPM
VENTRICULAR RATE: 73 BPM

## 2024-05-07 PROCEDURE — 84445 ASSAY OF TSI GLOBULIN: CPT | Performed by: INTERNAL MEDICINE

## 2024-05-07 PROCEDURE — 86376 MICROSOMAL ANTIBODY EACH: CPT | Performed by: INTERNAL MEDICINE

## 2024-05-07 PROCEDURE — 84443 ASSAY THYROID STIM HORMONE: CPT | Performed by: NURSE PRACTITIONER

## 2024-05-07 PROCEDURE — 99254 IP/OBS CNSLTJ NEW/EST MOD 60: CPT | Performed by: INTERNAL MEDICINE

## 2024-05-07 PROCEDURE — 84439 ASSAY OF FREE THYROXINE: CPT | Performed by: NURSE PRACTITIONER

## 2024-05-07 PROCEDURE — 86800 THYROGLOBULIN ANTIBODY: CPT | Performed by: INTERNAL MEDICINE

## 2024-05-07 PROCEDURE — 93306 TTE W/DOPPLER COMPLETE: CPT

## 2024-05-07 PROCEDURE — 83735 ASSAY OF MAGNESIUM: CPT | Performed by: NURSE PRACTITIONER

## 2024-05-07 PROCEDURE — 36415 COLL VENOUS BLD VENIPUNCTURE: CPT | Performed by: NURSE PRACTITIONER

## 2024-05-07 PROCEDURE — 84480 ASSAY TRIIODOTHYRONINE (T3): CPT | Performed by: INTERNAL MEDICINE

## 2024-05-07 PROCEDURE — 99245 OFF/OP CONSLTJ NEW/EST HI 55: CPT | Performed by: PSYCHIATRY & NEUROLOGY

## 2024-05-07 PROCEDURE — 99417 PROLNG OP E/M EACH 15 MIN: CPT | Performed by: PSYCHIATRY & NEUROLOGY

## 2024-05-07 PROCEDURE — 80048 BASIC METABOLIC PNL TOTAL CA: CPT | Performed by: NURSE PRACTITIONER

## 2024-05-07 PROCEDURE — 99232 SBSQ HOSP IP/OBS MODERATE 35: CPT | Performed by: INTERNAL MEDICINE

## 2024-05-07 PROCEDURE — 93010 ELECTROCARDIOGRAM REPORT: CPT | Performed by: INTERNAL MEDICINE

## 2024-05-07 PROCEDURE — 80061 LIPID PANEL: CPT | Performed by: NURSE PRACTITIONER

## 2024-05-07 PROCEDURE — 93306 TTE W/DOPPLER COMPLETE: CPT | Performed by: INTERNAL MEDICINE

## 2024-05-07 RX ORDER — CLOPIDOGREL BISULFATE 75 MG/1
300 TABLET ORAL ONCE
Status: COMPLETED | OUTPATIENT
Start: 2024-05-07 | End: 2024-05-07

## 2024-05-07 RX ADMIN — CLOPIDOGREL 300 MG: 75 TABLET ORAL at 18:46

## 2024-05-07 RX ADMIN — ENOXAPARIN SODIUM 40 MG: 40 INJECTION SUBCUTANEOUS at 09:09

## 2024-05-07 RX ADMIN — ATORVASTATIN CALCIUM 40 MG: 40 TABLET, FILM COATED ORAL at 16:11

## 2024-05-07 RX ADMIN — ASPIRIN 81 MG CHEWABLE TABLET 81 MG: 81 TABLET CHEWABLE at 09:08

## 2024-05-07 RX ADMIN — ACETAMINOPHEN 650 MG: 325 TABLET ORAL at 05:36

## 2024-05-07 RX ADMIN — TIMOLOL MALEATE 1 DROP: 5 SOLUTION OPHTHALMIC at 09:09

## 2024-05-07 NOTE — ASSESSMENT & PLAN NOTE
Was on Norvasc and lisinopril.  Taking Norvasc intermittently due to lack of refill.  Has not been taking lisinopril for about 1 month.  Made appointment today to see a new PCP within a week.  Hold Norvasc and lisinopril to allow permissive hypertension for now.  BP acceptable

## 2024-05-07 NOTE — CONSULTS
Consultation - Cherie Arias 48 y.o. female MRN: [id number]    Unit/Bed#: ED 12 Encounter: [id number]      Assessment/Plan     History of hyperthyroidism  Goiter  Has history of hyperthyroidism, off of methimazole for a few months  Has symptoms that could be associated with hyperthyroidism  Subclinical hypothyroidism based on labs.  Total T3 has not been checked  Patient does not know the day of hyperthyroidism Graves' versus toxic nodule.  Likely Graves'  -Will add on total T3 to labs  -Check TSI antibody, TPO, TG antibody  If TSI negative, recommend ultrasound thyroid as an outpatient to evaluate for nodules  Since patient has symptoms of hyperthyroidism even if she has subclinical hyperthyroidism, would recommend resuming methimazole however at a lower dose.  Will continue to follow  Recommend establishing care with endocrinology as an outpatient    3.  Strokelike symptoms-care per primary team, MRI pending  4.  History of vitamin D deficiency  - check vitamin D level      Inpatient consult to Endocrinology  Consult performed by: Whit Hopper MD  Consult ordered by: ALLEN Nesbitt          CC: Hyperthyroidism     History of Present Illness     HPI: Cherie Arias is a 48 y.o. year old female with medical history of hypertension, asthma, hypothyroidism who presented to the hospital yesterday 5/6/2024 with left-sided numbness, dizziness, difficulty in walking.  Symptoms lasted about 2 hours and then resolved.  Did not have any weakness by the time she presented to the hospital.  CT the head and neck-within normal limits  MRI head has been requested    States that she has had on and off symptoms.  Complains of pressure in the eyes, headaches, palpitations.  Was diagnosed with elevated intraocular pressure a few months ago, is on ocular drops.  States that she was not formally diagnosed with glaucoma however.  Initially diagnosed with hypothyroidism at the age of 21 was on medications for a few years and  then discontinued.    Recurrence of hyperthyroidism 8 years ago, was started on methimazole, most recently on 10 mg orally daily.  Ran out of medication approximately 4-5 months ago.  After that started having symptoms of hyperthyroidism including eye symptoms as above.  Lost weight from 140 pounds to 132 pounds in the last 2 months.  Has noticed some shakes tremors, occasional diarrhea.  Insomnia/not feeling well.  Has also had anxiety and difficulty in concentrating  Denies swelling in the neck, obstructive symptoms  Family history-mother and great aunt with history of thyroid disease as well as thyroidectomy    Also has a history vitamin D deficiency  Postmenopausal       Review of Systems    Historical Information   Past Medical History:   Diagnosis Date    Asthma     Disease of thyroid gland     Hypertension      Past Surgical History:   Procedure Laterality Date    TUBAL LIGATION       Social History   Social History     Substance and Sexual Activity   Alcohol Use Not Currently     Social History     Substance and Sexual Activity   Drug Use Yes    Types: Marijuana     Social History     Tobacco Use   Smoking Status Every Day    Current packs/day: 0.50    Types: Cigarettes   Smokeless Tobacco Never     Family History: History reviewed. No pertinent family history.    Meds/Allergies   Current Facility-Administered Medications   Medication Dose Route Frequency Provider Last Rate Last Admin    acetaminophen (TYLENOL) tablet 650 mg  650 mg Oral Q6H PRN ALLEN Nesbitt   650 mg at 05/07/24 0536    aspirin chewable tablet 81 mg  81 mg Oral Daily ALLEN Nesbitt   81 mg at 05/07/24 0908    atorvastatin (LIPITOR) tablet 40 mg  40 mg Oral Daily With Dinner ALLEN Nesbitt        enoxaparin (LOVENOX) subcutaneous injection 40 mg  40 mg Subcutaneous Daily ALLEN Nesbitt   40 mg at 05/07/24 0909    nicotine (NICODERM CQ) 14 mg/24hr TD 24 hr patch 1 patch  1 patch Transdermal Daily ALLEN Nesbitt         "ondansetron (ZOFRAN) injection 4 mg  4 mg Intravenous Q6H PRN ALLEN Nesbitt        timolol (TIMOPTIC) 0.5 % ophthalmic solution 1 drop  1 drop Both Eyes Daily ALLEN Nsebitt   1 drop at 05/07/24 0909     Current Outpatient Medications   Medication Sig Dispense Refill    amLODIPine (NORVASC) 10 mg tablet Take 10 mg by mouth daily      timolol (TIMOPTIC) 0.5 % ophthalmic solution INSTILL 1 DROP INTO BOTH EYES IN THE MORNING      albuterol (PROVENTIL HFA,VENTOLIN HFA) 90 mcg/act inhaler Inhale 2 puffs every 6 (six) hours as needed for wheezing      azelastine (ASTELIN) 0.1 % nasal spray 1 spray into each nostril 2 (two) times a day Use in each nostril as directed 30 mL 3    cyclobenzaprine (FLEXERIL) 10 mg tablet Take 10 mg by mouth daily as needed (Patient not taking: Reported on 10/30/2023)      fluocinolone acetonide (DermOtic) 0.01 % otic oil Administer 5 drops into both ears 2 (two) times a day 20 mL 0    fluticasone (FLONASE) 50 mcg/act nasal spray 1 spray into each nostril daily (Patient not taking: Reported on 10/30/2023) 16 g 0    lisinopril (ZESTRIL) 10 mg tablet Take 10 mg by mouth daily (Patient not taking: Reported on 10/30/2023)      methimazole (TAPAZOLE) 10 mg tablet Take 10 mg by mouth in the morning (Patient not taking: Reported on 3/14/2024)      naproxen (NAPROSYN) 500 mg tablet Take 1 tablet (500 mg total) by mouth 2 (two) times a day with meals for 7 days 14 tablet 0     Allergies   Allergen Reactions    Shellfish-Derived Products - Food Allergy Hives       Objective   Vitals: Blood pressure 148/85, pulse 69, temperature 98.7 °F (37.1 °C), resp. rate 18, height 5' 4\" (1.626 m), weight 61.2 kg (135 lb), SpO2 100%.  No intake or output data in the 24 hours ending 05/07/24 1431  Invasive Devices       Peripheral Intravenous Line  Duration             Peripheral IV 05/06/24 Right Antecubital <1 day                    Physical Exam      Constitutional:Oriented to person, place, and " "time  Appears well-developed and well-nourished. Not in any acute distress.   HENT:   Head: Normocephalic and atraumatic.  No proptosis/staring gaze  Neck: Normal range of motion.   Thyromegaly +, right > left, distinct nodule not palpated.  Nontender not fixed to the overlying skin or surrounding structures\  Pulmonary/Chest: Effort normal/ breathing comfortably on room air   Musculoskeletal: Normal range of motion.   Neurological: Alert and oriented to person, place, and time.  Tremors on the outstretched arms, DTRs not brisk  Skin: Not diaphoretic.   Psychiatric: Normal mood and affect. Behavior is normal.       The history was obtained from the review of the chart, patient and family.    Lab Results:   Results from last 7 days   Lab Units 05/06/24  1905   HEMOGLOBIN A1C % 5.0     Lab Results   Component Value Date    WBC 7.42 05/06/2024    HGB 13.3 05/06/2024    HCT 40.4 05/06/2024    MCV 98 05/06/2024     05/06/2024     Lab Results   Component Value Date/Time    BUN 11 05/07/2024 05:34 AM    K 3.8 05/07/2024 05:34 AM     05/07/2024 05:34 AM    CO2 28 05/07/2024 05:34 AM    CREATININE 0.66 05/07/2024 05:34 AM    AST 12 (L) 05/06/2024 07:05 PM    ALT 13 05/06/2024 07:05 PM    TP 7.4 05/06/2024 07:05 PM    ALB 4.2 05/06/2024 07:05 PM     Recent Labs     05/07/24  0534   HDL 54   TRIG 61     Component      Latest Ref Rng 5/7/2024   TSH 3RD GENERATON      0.450 - 4.500 uIU/mL 0.291 (L)    FREE T4      0.61 - 1.12 ng/dL 0.85       Legend:  (L) Low  No results found for: \"MICROALBUR\", \"[id number]\"  No results found for: \"POCGLU\"    Imaging Studies: I have personally reviewed pertinent reports.      Portions of the record may have been created with voice recognition software.  Occasional wrong word or \"sound a like\" substitutions may have occurred due to the inherent limitations of voice recognition software.  Read the chart carefully and recognize, using context, where substitutions have occurred.       "

## 2024-05-07 NOTE — ASSESSMENT & PLAN NOTE
Stopped taking Tapazole for about 4 months as she could not get refill.  Made appointment with Endo today for Wednesday.  Reports high eye pressure with pain for 3 days, takes timolol eyedrops.  TSH was 0.29.  Free T40.85  Pending endocrinology input

## 2024-05-07 NOTE — PLAN OF CARE
Problem: PAIN - ADULT  Goal: Verbalizes/displays adequate comfort level or baseline comfort level  Description: Interventions:  - Encourage patient to monitor pain and request assistance  - Assess pain using appropriate pain scale  - Administer analgesics based on type and severity of pain and evaluate response  - Implement non-pharmacological measures as appropriate and evaluate response  - Consider cultural and social influences on pain and pain management  - Notify physician/advanced practitioner if interventions unsuccessful or patient reports new pain  Outcome: Progressing     Problem: INFECTION - ADULT  Goal: Absence or prevention of progression during hospitalization  Description: INTERVENTIONS:  - Assess and monitor for signs and symptoms of infection  - Monitor lab/diagnostic results  - Monitor all insertion sites, i.e. indwelling lines, tubes, and drains  - Monitor endotracheal if appropriate and nasal secretions for changes in amount and color  - [place] appropriate cooling/warming therapies per order  - Administer medications as ordered  - Instruct and encourage patient and family to use good hand hygiene technique  - Identify and instruct in appropriate isolation precautions for identified infection/condition  Outcome: Progressing     Problem: SAFETY ADULT  Goal: Patient will remain free of falls  Description: INTERVENTIONS:  - Educate patient/family on patient safety including physical limitations  - Instruct patient to call for assistance with activity   - Consult OT/PT to assist with strengthening/mobility   - Keep Call bell within reach  - Keep bed low and locked with side rails adjusted as appropriate  - Keep care items and personal belongings within reach  - Initiate and maintain comfort rounds  - Make Fall Risk Sign visible to staff  - Offer Toileting every 2 Hours, in advance of need  - Initiate/Maintain bed/chair alarm  - Obtain necessary fall risk management equipment:  bed/chair alarm  -  Apply yellow socks and bracelet for high fall risk patients  - Consider moving patient to room near nurses station  Outcome: Progressing  Goal: Maintain or return to baseline ADL function  Description: INTERVENTIONS:  -  Assess patient's ability to carry out ADLs; assess patient's baseline for ADL function and identify physical deficits which impact ability to perform ADLs (bathing, care of mouth/teeth, toileting, grooming, dressing, etc.)  - Assess/evaluate cause of self-care deficits   - Assess range of motion  - Assess patient's mobility; develop plan if impaired  - Assess patient's need for assistive devices and provide as appropriate  - Encourage maximum independence but intervene and supervise when necessary  - Involve family in performance of ADLs  - Assess for home care needs following discharge   - Consider OT consult to assist with ADL evaluation and planning for discharge  - Provide patient education as appropriate  Outcome: Progressing  Goal: Maintains/Returns to pre admission functional level  Description: INTERVENTIONS:  - Perform AM-PAC 6 Click Basic Mobility/ Daily Activity assessment daily.  - Set and communicate daily mobility goal to care team and patient/family/caregiver.   - Collaborate with rehabilitation services on mobility goals if consulted  - Perform Range of Motion 3 times a day.  - Reposition patient every 3 hours.  - Dangle patient 3 times a day  - Stand patient 3 times a day  - Ambulate patient 3 times a day  - Out of bed to chair 3 times a day   - Out of bed for meals 3 times a day  - Out of bed for toileting  - Record patient progress and toleration of activity level   Outcome: Progressing     Problem: DISCHARGE PLANNING  Goal: Discharge to home or other facility with appropriate resources  Description: INTERVENTIONS:  - Identify barriers to discharge w/patient and caregiver  - Arrange for needed discharge resources and transportation as appropriate  - Identify discharge learning  needs (meds, wound care, etc.)  - Arrange for interpretive services to assist at discharge as needed  - Refer to Case Management Department for coordinating discharge planning if the patient needs post-hospital services based on physician/advanced practitioner order or complex needs related to functional status, cognitive ability, or social support system  Outcome: Progressing     Problem: Knowledge Deficit  Goal: Patient/family/caregiver demonstrates understanding of disease process, treatment plan, medications, and discharge instructions  Description: Complete learning assessment and assess knowledge base.  Interventions:  - Provide teaching at level of understanding  - Provide teaching via preferred learning methods  Outcome: Progressing     Problem: NEUROSENSORY - ADULT  Goal: Achieves stable or improved neurological status  Description: INTERVENTIONS  - Monitor and report changes in neurological status  - Monitor vital signs such as temperature, blood pressure, glucose, and any other labs ordered   - Initiate measures to prevent increased intracranial pressure  - Monitor for seizure activity and implement precautions if appropriate      Outcome: Progressing  Goal: Achieves maximal functionality and self care  Description: INTERVENTIONS  - Monitor swallowing and airway patency with patient fatigue and changes in neurological status  - Encourage and assist patient to increase activity and self care.   - Encourage visually impaired, hearing impaired and aphasic patients to use assistive/communication devices  Outcome: Progressing

## 2024-05-07 NOTE — ASSESSMENT & PLAN NOTE
Patient presents with dizziness and left side numbness weakness started around 2:40 PM today, lasted for 2-3 hours.  Reports went to 4 doctors office today including PT office.  Currently reporting mild left side weakness heaviness and headache, denies paresthesia, denies dizziness.  CTA head and neck unremarkable  TIA versus CVA  Will follow stroke pathway.  MRI of the brain  2D echo with bubble study  Telemetry. EKG showed NSR  Lipid panel, A1c  Permissive hypertension  Patient given full dose aspirin, Lipitor 80 mg p.o. in ED.  Will continue baby aspirin and Lipitor 40 mg p.o. daily starting tomorrow.  PT OT ST  Consult neurology

## 2024-05-07 NOTE — ASSESSMENT & PLAN NOTE
Stopped taking Tapazole for about 4 months as she could not get refill.  Made appointment with Amanda today for Wednesday.  Reports high eye pressure with pain for 3 days, takes timolol eyedrops.  Will check TSH, free T4  Consult Amanda

## 2024-05-07 NOTE — UTILIZATION REVIEW
Initial Clinical Review    Admission: Date/Time/Statement:   Admission Orders (From admission, onward)       Ordered        05/06/24 2127  Place in Observation  Once                          Orders Placed This Encounter   Procedures    Place in Observation     Standing Status:   Standing     Number of Occurrences:   1     Order Specific Question:   Level of Care     Answer:   Level 2 Stepdown / HOT [14]     ED Arrival Information       Expected   -    Arrival   5/6/2024 17:02    Acuity   Urgent              Means of arrival   Walk-In    Escorted by   Family Member    Service   Hospitalist    Admission type   Emergency              Arrival complaint   bp elevated/ left side numbness , blurred vision             Chief Complaint   Patient presents with    Dizziness     Had  orthopedic visit this am for radiculopathy, states about 2pm had episode of dizziness and chest pressure, relating many different complaints from recent to days ago       Initial Presentation:   48 yof to ER from home for sudden onset dizziness, left-sided numbness, difficulty walking. Symptoms started suddenly around 2 PM. Lasted for about 2 hours and completely resolved. Hx asthma, and hypertension. Admission CTA neg. Labs WNL. Placed in observation status for stroke-like symptoms.         ED Triage Vitals   Temperature Pulse Respirations Blood Pressure SpO2   05/06/24 1725 05/06/24 1725 05/06/24 1725 05/06/24 1725 05/06/24 1725   98 °F (36.7 °C) 82 20 144/77 98 %      Temp Source Heart Rate Source Patient Position - Orthostatic VS BP Location FiO2 (%)   05/06/24 1725 05/06/24 1725 05/06/24 1725 05/06/24 1725 --   Tympanic Monitor Sitting Right arm       Pain Score       05/07/24 0536       6          Wt Readings from Last 1 Encounters:   05/06/24 61.2 kg (135 lb)     Additional Vital Signs:   Date/Time Temp Pulse Resp BP MAP (mmHg) SpO2 O2 Device Patient Position - Orthostatic VS   05/07/24 0815 96.9 °F (36.1 °C) Abnormal  66 17 143/78 105 100 %  None (Room air) Lying   05/07/24 0600 -- 64 20 132/87 106 100 % -- --   05/07/24 0500 -- 58 20 138/79 101 100 % -- --   05/07/24 0400 -- 80 20 139/78 101 100 % -- --   05/07/24 0200 -- 58 20 119/73 91 100 % -- --   05/07/24 0100 -- 68 20 140/81 -- 100 % -- --   05/07/24 0030 -- 62 20 104/60 78 99 % -- --   05/07/24 0000 -- 62 -- 119/72 91 100 % -- --   05/06/24 2330 -- 76 -- 135/85 105 100 % -- --   05/06/24 2300 -- 66 20 137/83 103 100 % -- --   05/06/24 2230 -- 64 20 138/82 103 100 % -- --   05/06/24 2200 -- 74 20 149/95 116 100 % -- --   05/06/24 2130 -- 72 20 132/84 104 100 % -- --   05/06/24 2000 -- 70 20 136/84 106 100 % -- --   05/06/24 1930 -- 76 -- 142/84 108 100 % -- --   05/06/24 1911 -- -- -- -- -- -- None (Room air) --   05/06/24 1725 98 °F (36.7 °C) 82 20 144/77 -- 98 % None (Room air) Sitting     Pertinent Labs/Diagnostic Test Results:   CTA head and neck with and without contrast   Final Result by Shiv Asif MD (05/06 2106)      1. No acute intracranial abnormality.      2. No proximal large vessel occlusion in the head and neck.      3. No significant cervical carotid stenosis.            Workstation performed: [id number]         XR chest 1 view portable    (Results Pending)   MRI Inpatient Order    (Results Pending)         Results from last 7 days   Lab Units 05/06/24  1905   WBC Thousand/uL 7.42   HEMOGLOBIN g/dL 13.3   HEMATOCRIT % 40.4   PLATELETS Thousands/uL 281   TOTAL NEUT ABS Thousands/µL 4.09         Results from last 7 days   Lab Units 05/07/24  0534 05/06/24  1905   SODIUM mmol/L 140 139   POTASSIUM mmol/L 3.8 3.9   CHLORIDE mmol/L 106 108   CO2 mmol/L 28 26   ANION GAP mmol/L 6 5   BUN mg/dL 11 9   CREATININE mg/dL 0.66 0.63   EGFR ml/min/1.73sq m 104 105   CALCIUM mg/dL 9.1 9.2   MAGNESIUM mg/dL 2.0  --      Results from last 7 days   Lab Units 05/06/24  1905   AST U/L 12*   ALT U/L 13   ALK PHOS U/L 58   TOTAL PROTEIN g/dL 7.4   ALBUMIN g/dL 4.2   TOTAL BILIRUBIN mg/dL 0.25  "        Results from last 7 days   Lab Units 05/07/24  0534 05/06/24  1905   GLUCOSE RANDOM mg/dL 88 89             No results found for: \"BETA-HYDROXYBUTYRATE\"                   Results from last 7 days   Lab Units 05/06/24 2055 05/06/24  1905   HS TNI 0HR ng/L  --  4   HS TNI 2HR ng/L 4  --    HSTNI D2 ng/L 0  --              Results from last 7 days   Lab Units 05/07/24  0534   TSH 3RD GENERATON uIU/mL 0.291*                                                                                                           ED Treatment:   Medication Administration from 05/06/2024 1702 to 05/07/2024 0840         Date/Time Order Dose Route Action     05/06/2024 2036 EDT iohexol (OMNIPAQUE) 350 MG/ML injection (MULTI-DOSE) 85 mL 85 mL Intravenous Given     05/06/2024 2157 EDT aspirin tablet 325 mg 325 mg Oral Given     05/06/2024 2213 EDT atorvastatin (LIPITOR) tablet 80 mg 80 mg Oral Given     05/06/2024 2310 EDT timolol (TIMOPTIC) 0.5 % ophthalmic solution 1 drop 1 drop Both Eyes Given     05/07/2024 0536 EDT acetaminophen (TYLENOL) tablet 650 mg 650 mg Oral Given          Past Medical History:   Diagnosis Date    Asthma     Disease of thyroid gland     Hypertension      Present on Admission:  **None**      Admitting Diagnosis: Dizziness [R42]  Age/Sex: 48 y.o. female  Admission Orders:  Neuro checks: Every 1 hour x 4 hours, then every 2 hours x 8 hours, then every 4 hours x 72 hours   Nursing dysphagia screen  Pt/ot/st eval & tx  Consult neuro  Consult endocrinology    Scheduled Medications:  aspirin, 81 mg, Oral, Daily  atorvastatin, 40 mg, Oral, Daily With Dinner  enoxaparin, 40 mg, Subcutaneous, Daily  nicotine, 1 patch, Transdermal, Daily  timolol, 1 drop, Both Eyes, Daily    PRN Meds:  acetaminophen, 650 mg, Oral, Q6H PRN  ondansetron, 4 mg, Intravenous, Q6H PRN      Network Utilization Review Department  ATTENTION: Please call with any questions or concerns to [phone] and carefully listen to the prompts so that " you are directed to the right person. All voicemails are confidential.   For Discharge needs, contact Care Management DC Support Team at [phone] opt. 2  Send all requests for admission clinical reviews, approved or denied determinations and any other requests to dedicated fax number below belonging to the campus where the patient is receiving treatment. List of dedicated fax numbers for the Facilities:  FACILITY NAME UR FAX NUMBER   ADMISSION DENIALS (Administrative/Medical Necessity) [phone]   DISCHARGE SUPPORT TEAM (NETWORK) [phone]   PARENT CHILD HEALTH (Maternity/NICU/Pediatrics) [phone]   [facility] [phone]   [facility] [phone]   [facility] [phone]   [facility] [phone]   [facility] [phone]   [facility] [phone]   [facility] [phone]   [facility] [phone]   [facility] [phone]   [facility] [phone]   [facility] [phone]   [facility] [phone]

## 2024-05-07 NOTE — PROGRESS NOTES
[facility]  Progress Note  Name: Cherie Arias I  MRN: [id number]  Unit/Bed#: ED 12 I Date of Admission: 5/6/2024   Date of Service: 5/7/2024 I Hospital Day: 0    Assessment/Plan   * Stroke-like symptoms  Assessment & Plan  Patient presents with dizziness and left side numbness weakness started around 2:40 PM on the day of admission lasted for 2-3 hours.  Reports went to 4 doctors office today including PT office  CTA head and neck unremarkable  TIA versus CVA  Will follow stroke pathway.  MRI of the brain pending at the current time  2D echo with bubble study-EF 54% without any PFO with normal valvular function  Telemetry. EKG showed NSR  LDL was 76.  Hemoglobin A1c was 5  Permissive hypertension  Patient given full dose aspirin, Lipitor 80 mg p.o. in ED. continue baby aspirin and Lipitor  Neurology input pending at the current time      Hyperthyroidism  Assessment & Plan  Stopped taking Tapazole for about 4 months as she could not get refill.  Made appointment with Endo today for Wednesday.  Reports high eye pressure with pain for 3 days, takes timolol eyedrops.  TSH was 0.29.  Free T40.85  Pending endocrinology input    Asthma  Assessment & Plan  Possible history of asthma.  No evidence of exacerbation    Nicotine abuse  Assessment & Plan  Nicotine patch, smoking cessation    Hypertension  Assessment & Plan  Was on Norvasc and lisinopril.  Taking Norvasc intermittently due to lack of refill.  Has not been taking lisinopril for about 1 month.  Made appointment today to see a new PCP within a week.  Norvasc and lisinopril have been on hold to allow permissive hypertension  Blood pressure acceptable               VTE Pharmacologic Prophylaxis:   Moderate Risk (Score 3-4) - Pharmacological DVT Prophylaxis Ordered: enoxaparin (Lovenox).    Mobility:   Basic Mobility Inpatient Raw Score: 24  JH-HLM Goal: 8: Walk 250 feet or more  JH-HLM Achieved: 7: Walk 25 feet or more  JH-HLM Goal  achieved. Continue to encourage appropriate mobility.    Patient Centered Rounds: I performed bedside rounds with nursing staff today.   Discussions with Specialists or Other Care Team Provider: Neurology    Education and Discussions with Family / Patient: Updated  (daughter) at bedside.    Total Time Spent on Date of Encounter in care of patient: 35 mins. This time was spent on one or more of the following: performing physical exam; counseling and coordination of care; obtaining or reviewing history; documenting in the medical record; reviewing/ordering tests, medications or procedures; communicating with other healthcare professionals and discussing with patient's family/caregivers.    Current Length of Stay: 0 day(s)  Current Patient Status: Observation   Certification Statement: The patient will continue to require additional inpatient hospital stay due to strokelike symptoms pending MRI, hyperthyroidism  Discharge Plan: Anticipate discharge in 24-48 hrs to home.    Code Status: Level 1 - Full Code    Subjective:   Patient reports headache which is moving in her head.  Patient also reports flushed sensation.    Objective:     Vitals:   Temp (24hrs), Av.9 °F (36.6 °C), Min:96.9 °F (36.1 °C), Max:98.7 °F (37.1 °C)    Temp:  [96.9 °F (36.1 °C)-98.7 °F (37.1 °C)] 98.7 °F (37.1 °C)  HR:  [58-82] 69  Resp:  [17-20] 18  BP: (104-149)/(60-95) 148/85  SpO2:  [98 %-100 %] 100 %  Body mass index is 23.17 kg/m².     Input and Output Summary (last 24 hours):   No intake or output data in the 24 hours ending 24 1428    Physical Exam:   Physical Exam  Constitutional:       Appearance: Normal appearance.   HENT:      Head: Normocephalic and atraumatic.      Nose: Nose normal.      Mouth/Throat:      Mouth: Mucous membranes are moist.      Pharynx: Oropharynx is clear.   Eyes:      Extraocular Movements: Extraocular movements intact.      Pupils: Pupils are equal, round, and reactive to light.    Cardiovascular:      Rate and Rhythm: Normal rate and regular rhythm.   Pulmonary:      Effort: Pulmonary effort is normal.      Breath sounds: Normal breath sounds.   Abdominal:      General: Bowel sounds are normal. There is no distension.      Palpations: Abdomen is soft.      Tenderness: There is no abdominal tenderness.   Musculoskeletal:         General: No swelling.      Cervical back: Normal range of motion and neck supple.   Skin:     General: Skin is warm and dry.   Neurological:      General: No focal deficit present.      Mental Status: She is alert.          Additional Data:     Labs:  Results from last 7 days   Lab Units 05/06/24  1905   WBC Thousand/uL 7.42   HEMOGLOBIN g/dL 13.3   HEMATOCRIT % 40.4   PLATELETS Thousands/uL 281   SEGS PCT % 55   LYMPHO PCT % 34   MONO PCT % 8   EOS PCT % 2     Results from last 7 days   Lab Units 05/07/24  0534 05/06/24  1905   SODIUM mmol/L 140 139   POTASSIUM mmol/L 3.8 3.9   CHLORIDE mmol/L 106 108   CO2 mmol/L 28 26   BUN mg/dL 11 9   CREATININE mg/dL 0.66 0.63   ANION GAP mmol/L 6 5   CALCIUM mg/dL 9.1 9.2   ALBUMIN g/dL  --  4.2   TOTAL BILIRUBIN mg/dL  --  0.25   ALK PHOS U/L  --  58   ALT U/L  --  13   AST U/L  --  12*   GLUCOSE RANDOM mg/dL 88 89             Results from last 7 days   Lab Units 05/06/24  1905   HEMOGLOBIN A1C % 5.0           Lines/Drains:  Invasive Devices       Peripheral Intravenous Line  Duration             Peripheral IV 05/06/24 Right Antecubital <1 day                      Telemetry:  Telemetry Orders (From admission, onward)               24 Hour Telemetry Monitoring  Continuous x 24 Hours (Telem)        Question:  Reason for 24 Hour Telemetry  Answer:  TIA/Suspected CVA/ Confirmed CVA                     Telemetry Reviewed: Normal Sinus Rhythm  Indication for Continued Telemetry Use: No indication for continued use. Will discontinue.              Imaging: Reviewed radiology reports from this admission including: CT head and CTA of  the head and neck, chest x-ray    Recent Cultures (last 7 days):         Last 24 Hours Medication List:   Current Facility-Administered Medications   Medication Dose Route Frequency Provider Last Rate    acetaminophen  650 mg Oral Q6H PRN ALLEN Nesbitt      aspirin  81 mg Oral Daily ALLEN Nesbitt      atorvastatin  40 mg Oral Daily With Dinner ALLEN Nesbitt      enoxaparin  40 mg Subcutaneous Daily ALLEN Nesbitt      nicotine  1 patch Transdermal Daily ALLEN Nesbitt      ondansetron  4 mg Intravenous Q6H PRN ALLEN Nesbitt      timolol  1 drop Both Eyes Daily ALLEN Nesbitt          Today, Patient Was Seen By: Alvarado Stevens MD    **Please Note: This note may have been constructed using a voice recognition system.**

## 2024-05-07 NOTE — H&P
[facility]  H&P  Name: Cherie Arias 48 y.o. female I MRN: [id number]  Unit/Bed#: ED 12 I Date of Admission: 5/6/2024   Date of Service: 5/7/2024 I Hospital Day: 0      Assessment/Plan   * Stroke-like symptoms  Assessment & Plan  Patient presents with dizziness and left side numbness weakness started around 2:40 PM today, lasted for 2-3 hours.  Reports went to 4 doctors office today including PT office.  Currently reporting mild left side weakness heaviness and headache, denies paresthesia, denies dizziness.  CTA head and neck unremarkable  TIA versus CVA  Will follow stroke pathway.  MRI of the brain  2D echo with bubble study  Telemetry. EKG showed NSR  Lipid panel, A1c  Permissive hypertension  Patient given full dose aspirin, Lipitor 80 mg p.o. in ED.  Will continue baby aspirin and Lipitor 40 mg p.o. daily starting tomorrow.  PT OT ST  Consult neurology      Hypertension  Assessment & Plan  Was on Norvasc and lisinopril.  Taking Norvasc intermittently due to lack of refill.  Has not been taking lisinopril for about 1 month.  Made appointment today to see a new PCP within a week.  Hold Norvasc and lisinopril to allow permissive hypertension for now.  BP acceptable    Hyperthyroidism  Assessment & Plan  Stopped taking Tapazole for about 4 months as she could not get refill.  Made appointment with Endo today for Wednesday.  Reports high eye pressure with pain for 3 days, takes timolol eyedrops.  Will check TSH, free T4  Consult Endo    Asthma  Assessment & Plan  Possible history of asthma.  No wheezing on auscultation.    Nicotine abuse  Assessment & Plan  Nicotine patch, smoking cessation        VTE Prophylaxis: Enoxaparin (Lovenox)  / reason for no mechanical VTE prophylaxis on lovenox    Code Status: Full code  POLST: POLST form is not discussed and not completed at this time.    Anticipated Length of Stay:  Patient will be admitted on an Observation basis with an anticipated  length of stay of  < 2 midnights.   Justification for Hospital Stay: Strokelike symptoms    Total Time for Visit, including Counseling / Coordination of Care: 45 minutes.  Greater than 50% of this total time spent on direct patient counseling and coordination of care.    Chief Complaint:   Strokelike symptoms    History of Present Illness:    Cherie Arias is a 48 y.o. female with PMH of hypertension, hyperthyroid, nicotine abuse, asthma who presents with dizziness and left side numbness weakness started around 2:40 PM today, lasted for 2-3 hours.  Patient reports she was sitting down and reading a book when dizziness started, she thought her BP was high but did not check it.  When she got up to walk, she felt left-sided numbness with heaviness, was having trouble walking.  Daughter at bedside reports she noticed patient's speech was slow at the time which is abnormal for her.  Patient reports high eye pressure in past 3 days with eye pain, seeing an eye doctor.  Patient reports she has thyroid problem, has not been taking medication for 4 months due to no refill.  She went to endocrinology office today and made appointment for Wednesday.  Patient reports she went to total 4 doctors office today including PT office.  Patient reports headache currently.  Denies dizziness.  Denies left-sided numbness currently, reports only very mild left-sided weakness with heaviness currently.  Patient reports chronic SOB with exertion.  Reports left-sided neck pain and left hip pain, seen PT and orthopedic surgery.  Denies chest pain, nausea vomiting, fever or chills.  Patient reports mother had a stroke.      Review of Systems:    Review of Systems   Constitutional:  Positive for activity change.   Eyes:  Positive for pain.        High eye pressure for 3 days   Neurological:  Positive for dizziness, speech difficulty, weakness, numbness and headaches.   All other systems reviewed and are negative.      Past Medical and  Surgical History:     Past Medical History:   Diagnosis Date    Asthma     Disease of thyroid gland     Hypertension        Past Surgical History:   Procedure Laterality Date    TUBAL LIGATION         Meds/Allergies:    Prior to Admission medications    Medication Sig Start Date End Date Taking? Authorizing Provider   amLODIPine (NORVASC) 10 mg tablet Take 10 mg by mouth daily   Yes Historical Provider, MD   timolol (TIMOPTIC) 0.5 % ophthalmic solution INSTILL 1 DROP INTO BOTH EYES IN THE MORNING 3/5/24  Yes Historical Provider, MD   albuterol (PROVENTIL HFA,VENTOLIN HFA) 90 mcg/act inhaler Inhale 2 puffs every 6 (six) hours as needed for wheezing    Historical Provider, MD   azelastine (ASTELIN) 0.1 % nasal spray 1 spray into each nostril 2 (two) times a day Use in each nostril as directed 10/30/23 11/29/23  Natalya Mayen MD   cyclobenzaprine (FLEXERIL) 10 mg tablet Take 10 mg by mouth daily as needed  Patient not taking: Reported on 10/30/2023 9/22/23   Historical Provider, MD   fluocinolone acetonide (DermOtic) 0.01 % otic oil Administer 5 drops into both ears 2 (two) times a day 10/30/23   Natalya Mayen MD   fluticasone (FLONASE) 50 mcg/act nasal spray 1 spray into each nostril daily  Patient not taking: Reported on 10/30/2023 7/19/23   Maia Ortiz PA-C   lisinopril (ZESTRIL) 10 mg tablet Take 10 mg by mouth daily  Patient not taking: Reported on 10/30/2023 8/29/23   Historical Provider, MD   methimazole (TAPAZOLE) 10 mg tablet Take 10 mg by mouth in the morning  Patient not taking: Reported on 3/14/2024    Historical Provider, MD   naproxen (NAPROSYN) 500 mg tablet Take 1 tablet (500 mg total) by mouth 2 (two) times a day with meals for 7 days 2/28/24 3/14/24  Kristofer Partida, DO     I have reviewed home medications with patient personally.    Allergies:   Allergies   Allergen Reactions    Shellfish-Derived Products - Food Allergy Hives       Social History:     Marital Status:  /Civil Union   Occupation: Unemployed  Patient Pre-hospital Living Situation: Alone  Patient Pre-hospital Level of Mobility: Independent  Patient Pre-hospital Diet Restrictions: Cardiac  Substance Use History:   Social History     Substance and Sexual Activity   Alcohol Use Not Currently     Social History     Tobacco Use   Smoking Status Every Day    Current packs/day: 0.50    Types: Cigarettes   Smokeless Tobacco Never     Social History     Substance and Sexual Activity   Drug Use Yes    Types: Marijuana       Family History:    non-contributory    Physical Exam:     Vitals:   Blood Pressure: 104/60 (05/07/24 0030)  Pulse: 62 (05/07/24 0030)  Temperature: 98 °F (36.7 °C) (05/06/24 1725)  Temp Source: Tympanic (05/06/24 1725)  Respirations: 20 (05/07/24 0030)  Weight - Scale: 61.2 kg (135 lb) (05/06/24 2100)  SpO2: 99 % (05/07/24 0030)    Physical Exam  Vitals and nursing note reviewed.   Constitutional:       Appearance: She is well-developed.      Comments: Patient appears uncomfortable   HENT:      Head: Normocephalic and atraumatic.   Neck:      Thyroid: No thyromegaly.      Vascular: No JVD.      Trachea: No tracheal deviation.   Cardiovascular:      Rate and Rhythm: Normal rate and regular rhythm.      Heart sounds: Normal heart sounds.   Pulmonary:      Effort: Pulmonary effort is normal. No respiratory distress.      Breath sounds: Normal breath sounds. No wheezing or rales.   Abdominal:      General: Bowel sounds are normal. There is no distension.      Palpations: Abdomen is soft.      Tenderness: There is no abdominal tenderness. There is no guarding.   Musculoskeletal:         General: Normal range of motion.      Cervical back: Neck supple.      Right lower leg: No edema.      Left lower leg: No edema.   Skin:     General: Skin is warm and dry.   Neurological:      Mental Status: She is alert and oriented to person, place, and time.      Comments: Very subtle left-sided weakness.     Psychiatric:         Mood and Affect: Mood normal.         Judgment: Judgment normal.         Additional Data:     Lab Results: I have personally reviewed pertinent reports.      Results from last 7 days   Lab Units 05/06/24  1905   WBC Thousand/uL 7.42   HEMOGLOBIN g/dL 13.3   HEMATOCRIT % 40.4   PLATELETS Thousands/uL 281   SEGS PCT % 55   LYMPHO PCT % 34   MONO PCT % 8   EOS PCT % 2     Results from last 7 days   Lab Units 05/06/24  1905   POTASSIUM mmol/L 3.9   CHLORIDE mmol/L 108   CO2 mmol/L 26   BUN mg/dL 9   CREATININE mg/dL 0.63   CALCIUM mg/dL 9.2   ALK PHOS U/L 58   ALT U/L 13   AST U/L 12*           Imaging: I have personally reviewed pertinent reports.      CTA head and neck with and without contrast    Result Date: 5/6/2024  Narrative: CTA NECK AND BRAIN WITH AND WITHOUT CONTRAST INDICATION: Dizziness, left sided numbness, now resolved COMPARISON:   None. TECHNIQUE:  Routine CT imaging of the Brain without contrast.  Post contrast imaging was performed after administration of iodinated contrast through the neck and brain. Post contrast axial 0.625 mm images timed to opacify the arterial system. 3D rendering was performed on an independent workstation.   MIP reconstructions performed. Coronal reconstructions were performed of the noncontrast portion of the brain. Radiation dose length product (DLP) for this visit:  1181.79 mGy-cm .  This examination, like all CT scans performed in the [facility], was performed utilizing techniques to minimize radiation dose exposure, including the use of iterative reconstruction and automated exposure control. IV Contrast:  85 mL of iohexol (OMNIPAQUE) IMAGE QUALITY:   Diagnostic FINDINGS: NONCONTRAST BRAIN PARENCHYMA: Decreased attenuation is noted in periventricular and subcortical white matter demonstrating an appearance that is statistically most likely to represent moderate microangiopathic change. No CT signs of acute infarction.  No  intracranial mass, mass effect or midline shift. No acute parenchymal hemorrhage. VENTRICLES AND EXTRA-AXIAL SPACES:  Normal for the patient's age. VISUALIZED ORBITS: No acute abnormality. PARANASAL SINUSES: Scattered mucosal thickening of the paranasal sinuses with a left maxillary sinus mucosal retention cyst. Small osteoma of the left anterior ethmoid air cells. CERVICAL VASCULATURE AORTIC ARCH AND GREAT VESSELS: Normal aortic arch. RIGHT VERTEBRAL ARTERY CERVICAL SEGMENT: Patent. No significant stenosis. LEFT VERTEBRAL ARTERY CERVICAL SEGMENT: Patent. No significant stenosis. RIGHT EXTRACRANIAL CAROTID SEGMENT: Patent. No significant stenosis. LEFT EXTRACRANIAL CAROTID SEGMENT: Patent. No significant stenosis. NASCET criteria was used to determine the degree of internal carotid artery diameter stenosis. INTRACRANIAL VASCULATURE INTERNAL CAROTID ARTERIES:  Normal enhancement of the intracranial portions of the internal carotid arteries. ANTERIOR CIRCULATION: Patent. No significant stenosis. MIDDLE CEREBRAL ARTERY CIRCULATION: Patent. No significant stenosis. DISTAL VERTEBRAL ARTERIES:  Normal distal vertebral arteries. BASILAR ARTERY: Patent. No significant stenosis. POSTERIOR CEREBRAL ARTERIES: Patent. No significant stenosis. VENOUS STRUCTURES:  Normal, noting this examination is not tailored for evaluation. NON VASCULAR ANATOMY BONY STRUCTURES:  No acute osseous abnormality. Kerry mandibularis are present. Multiple segmentation anomalies of the cervical spine. SOFT TISSUES OF THE NECK: No acute abnormality. THORACIC INLET: Multiple subcentimeter thyroid nodules bilaterally. No dominant thyroid nodule.     Impression: 1. No acute intracranial abnormality. 2. No proximal large vessel occlusion in the head and neck. 3. No significant cervical carotid stenosis. Workstation performed: [id number]     CT abdomen pelvis with contrast    Result Date: 4/13/2024  Narrative: CT ABDOMEN AND PELVIS WITH IV CONTRAST  "INDICATION: Evaluate for ureterolithiasis. Pain. COMPARISON: None. TECHNIQUE: CT examination of the abdomen and pelvis was performed. Multiplanar 2D reformatted images were created from the source data. This examination, like all CT scans performed in the [facility], was performed utilizing techniques to minimize radiation dose exposure, including the use of iterative reconstruction and automated exposure control. Radiation dose length product (DLP) for this visit: 466.87 mGy-cm IV Contrast: 100 mL of iohexol (OMNIPAQUE) Enteric Contrast: Not administered. FINDINGS: ABDOMEN LOWER CHEST: No clinically significant abnormality in the visualized lower chest. LIVER/BILIARY TREE: Small hepatic cysts, largest 7 mm in segment 4A on image 37. No suspicious solid hepatic mass. Normal hepatic contours. No biliary dilatation. GALLBLADDER: No calcified gallstones. No pericholecystic inflammatory change. SPLEEN: Unremarkable. PANCREAS: Pancreatic tail cyst, 4 mm. No solid pancreatic mass or pancreatic ductal enlargement. ADRENAL GLANDS: Unremarkable. KIDNEYS/URETERS: Symmetric nephrograms. No solid renal mass. No urinary tract calculus. No hydronephrosis. STOMACH AND BOWEL: Unremarkable. APPENDIX: Normal. ABDOMINOPELVIC CAVITY: No ascites. No pneumoperitoneum. No lymphadenopathy. VESSELS: No abdominal aortic aneurysm. Prominent left gonadal vein and left parametrial vessels. PELVIS REPRODUCTIVE ORGANS: Unremarkable for patient's age. URINARY BLADDER: Unremarkable. ABDOMINAL WALL/INGUINAL REGIONS: Unremarkable. BONES: No acute fracture or suspicious osseous lesion.     Impression: No urinary tract calculi or hydronephrosis. Incidentally discovered tiny pancreatic tail cyst. Based on institutional consensus and radiology literature, recommendation is for contrast-enhanced MRI/MRCP to be performed 1 year from this examination. This examination was marked \"immediate notification\" in Epic in order to begin the " standard process by which the radiology reading room liaison alerts the referring practitioner. Workstation performed: [id number]       EKG, Pathology, and Other Studies Reviewed on Admission:   EKG: nsr    Allscripts Records Reviewed: Yes     ** Please Note: Dragon 360 Dictation voice to text software may have been used in the creation of this document. **

## 2024-05-07 NOTE — SPEECH THERAPY NOTE
Pt presented to ED with dizziness and L sided numbness.  She passed RN Dysphagia Assessment.  MRI of brain pending at this time (await Neurology Evaluation and MRI results).   Amanda Hines MS CCC-SLP   [place] License [id number]

## 2024-05-07 NOTE — CASE MANAGEMENT
Case Management Assessment & Discharge Planning Note    Patient name Cherie Arias  Location [address] [address]/[address] [address]-* MRN [id number]  : 1975 Date 2024       Current Admission Date: 2024  Current Admission Diagnosis:Stroke-like symptoms   Patient Active Problem List    Diagnosis Date Noted    Asthma 2024    Stroke-like symptoms 2024    Hypertension 2024    Hyperthyroidism 2024    Nicotine abuse 2024      LOS (days): 0  Geometric Mean LOS (GMLOS) (days):   Days to GMLOS:     OBJECTIVE:        Current admission status: Observation  Referral Reason: Stroke    Preferred Pharmacy:   [id number] - [place], [place] - [address] [address]  Phone: [phone] Fax: [phone]    Primary Care Provider: Kenan Vargas    Primary Insurance: [facility] [facility]  Secondary Insurance:     ASSESSMENT:    Readmission Root Cause  30 Day Readmission: No    Patient Information  Admitted from:: Home  Mental Status: Alert  During Assessment patient was accompanied by: Not accompanied during assessment  Assessment information provided by:: Patient  Primary Caregiver: Self  Support Systems: Self, Family members  County of Residence: [place]  What city do you live in?: [place]  Home entry access options. Select all that apply.: Stairs  Number of steps to enter home.: One Flight  Do the steps have railings?: Yes  Type of Current Residence: 2 story home  Living Arrangements: Lives w/ Spouse/significant other, Lives w/ Extended Family  Is patient a ?: No    Activities of Daily Living Prior to Admission  Functional Status: Independent  Completes ADLs independently?: Yes  Ambulates independently?: Yes  Does patient use assisted devices?: No  Does patient currently own DME?: No  Does patient have a history of Outpatient Therapy (PT/OT)?: Yes  Does the patient have a history of Short-Term Rehab?: No  Does patient have  a history of HHC?: No  Does patient currently have HHC?: No    Patient Information Continued  Income Source: Unemployed  Does patient have prescription coverage?: Yes  Does patient receive dialysis treatments?: No  Does patient have a history of substance abuse?: No  Does patient have a history of Mental Health Diagnosis?: No    PHQ 2/9 Screening   Reviewed PHQ 2/9 Depression Screening Score?: No    Means of Transportation  Means of Transport to Appts:: Drives Self      Social Determinants of Health (SDOH)      Flowsheet Row Most Recent Value   Housing Stability    In the last 12 months, was there a time when you were not able to pay the mortgage or rent on time? N   In the last 12 months, how many places have you lived? 1   In the last 12 months, was there a time when you did not have a steady place to sleep or slept in a shelter (including now)? N   Transportation Needs    In the past 12 months, has lack of transportation kept you from medical appointments or from getting medications? no   In the past 12 months, has lack of transportation kept you from meetings, work, or from getting things needed for daily living? No   Food Insecurity    Within the past 12 months, you worried that your food would run out before you got the money to buy more. Never true   Within the past 12 months, the food you bought just didn't last and you didn't have money to get more. Never true   Utilities    In the past 12 months has the electric, gas, oil, or water company threatened to shut off services in your home? No          DISCHARGE DETAILS:    Discharge planning discussed with:: Patient  Freedom of Choice: Yes     CM contacted family/caregiver?: Yes (VM left for daughter)  Were Treatment Team discharge recommendations reviewed with patient/caregiver?: Yes  Did patient/caregiver verbalize understanding of patient care needs?: Yes  Were patient/caregiver advised of the risks associated with not following Treatment Team discharge  recommendations?: Yes    Requested Home Health Care         Is the patient interested in HHC at discharge?: No    DME Referral Provided  Referral made for DME?: No    Other Referral/Resources/Interventions Provided:  Interventions: None Indicated    Would you like to participate in our [facility] service program?  : No - Declined    Treatment Team Recommendation: Home  Discharge Destination Plan:: Home  Transport at Discharge : Family

## 2024-05-07 NOTE — ASSESSMENT & PLAN NOTE
Was on Norvasc and lisinopril.  Taking Norvasc intermittently due to lack of refill.  Has not been taking lisinopril for about 1 month.  Made appointment today to see a new PCP within a week.  Norvasc and lisinopril have been on hold to allow permissive hypertension  Blood pressure acceptable

## 2024-05-07 NOTE — CONSULTS
[facility]   Neurology Initial Consult    Cherie Arias is a 48 y.o. female  [address] [address]/[address] [place] [address]-*          Information obtained from:   Chief Complaint   Patient presents with    Dizziness     Had  orthopedic visit this am for radiculopathy, states about 2pm had episode of dizziness and chest pressure, relating many different complaints from recent to days ago         Assessment/Plan:    1.  Strokelike symptoms  2.  Body tremor/shaking  3.  Hyperthyroidism  4.  Sinus disease  5.  History of migraines  6.  Glaucoma    -Monitor on telemetry  -Neuro assessments  -Continue baby aspirin 81 mg daily  -Continue atorvastatin 40 mg daily   -will add Plavix load tonight, pending MRI will start DAPT tomorrow if MRI +  -MRI of the brain pending 5/8/2024 at 7 AM  -Echocardiogram with shunt study completed with EF of 55%.  Atrium normal in size, no PFO or shunt seen  -Lipid profile and A1c  -PT/OT/ST  -Recommend endocrinology consultation for hyperthyroidism, patient is 4-5 months, symptoms possibly related    Patient is a 48-year-old female who came to the ER after having continued bouts of dizziness/lightheadedness with feeling off balance walking.  She had episode of visual changes which have been ongoing for several months, recently started on Timoptic due to high intraocular pressure.  She had developed left-sided numbness with body shaking event on 5/6/2024 therefore was brought to the ER.  Patient reported she felt ataxic with her gait and noted a heaviness to her left side with numbness.  The symptoms seemed to have resolved after approximately 2 hours.  Upon arrival to the ER patient's blood pressure was 144/77, she had an NIH score of 0.  Patient has low TSH level 0.291 and reported being off of her thyroid medication x 4 months.  She had CTA of the head and neck which showed negative vessel disease however did show some effort areas of sinus thickening.  Patient reports that she had recently  treated for sinus infection with Augmentin approximately 3 to 4 weeks ago.  Patient had echocardiogram done with EF of 55%.  No PFO or interatrial communication.  Her atrium are normal in size.  She received a full dose aspirin while in the ER with high-dose statin and was to continue on baby aspirin daily with Lipitor 40 mg.  MRI of the brain has been ordered due to her unilateral symptoms, this is pending for 7 AM tomorrow.  On neurological exam patient has no significant focal deficits however does have some mild weakness in the left upper extremity related to significant neck and left shoulder pain for which she sees orthopedics.  Patient states that she had no providers in the area, recently moved and has been trying to seek out providers for her health issues.    Patient did see ophthalmologist in March 2024, was started on Timoptic eyedrops due to high intraocular pressure of 31, she had a follow-up and had some improvement since starting her drops however she reports feeling as if her eyes have a lot of pressure and if she has had increased pain in the eyes over the course of 8 days.  Question the possibility of thyroid eye disease.  Patient reports feeling off balance when ambulating, ataxic with no reports of room spinning type dizziness or lightheadedness.  She had a heaviness in her left side, this is since resolved.  Patient does get headaches although notes that they are usually second to elevated blood pressure and reports that they are mostly a pulsatile type pain in the right temporal, at times can be in the left and posterior head.  She reports this happens pain couple times per week.  Patient has history of hypothyroidism, she was on medications but stopped approximately 4 to 5 months ago due to her inability to access it.  She is looking for a new endocrinologist in the area as she is unable to travel back and forth to her prior provider.  Question if patient's symptoms can possibly be related to  hyperthyroidism with the absence of treatment, would recommend endocrinology evaluation for further treatment recommendations and post follow-up care.  From neurological perspective, continue patient on baby aspirin 81 mg daily with statin, MRI pending.  Will continue to follow for any other neurological etiologies.        Cherie Arias will need follow up in 8-10 weeks with general attending or advance practitioner. She will not require outpatient neurological testing.      HPI:  Cherie Arias is a 47yo female with PMH of asthma, hypothyroidism, hypertension, headaches and left cervicalgia with radiculopathy who was brought to the ER on 5/6/2024 after having increased episodes of dizziness with chest pressure, left-sided numbness and difficulties ambulating.  The symptoms lasted approximately 2 hours before they resolved.  She reported a left-sided heaviness with onset of headache on arrival to the ER.  She had an NIH score of 0, her blood pressure was 144/77.  Patient's speech was slowed and she reported increased pain behind her eyes with increased pressure for over 3 days.  Patient presented with low TSH level of 0.291, CTA of the head and neck revealed some sinus disease with negative vessel occlusion, aneurysm or dissections.  Patient had previously had an echocardiogram showing no PFO, normal atrium and an EF of 55% prior to this hospitalization.  Patient reported approximately 3 weeks ago she started having issues with dizziness.  She stated it was more like ataxia, noted that she could not walk straight.  She stated this would come and go, her vision started having increased issues approximately 8 days ago.  She does see ophthalmology, was seen for these issues in March with follow-up since starting on ocular drops.  She reports increased pressure, her ocular pressure was 31 and was started on Timoptic for likely glaucoma.  Patient does have some thyroid disease, her TSH levels are low.  She had  "been on medication however stopped taking it over 4 months ago.  Was unable to continue to access it.  Patient reports that she is from another region, she recently moved here and does not have any providers.  She has been traveling from office to office setting up appointments and trying to be seen for all of her complaints.  Patient reported last episode she had difficulties with walking, or her daughter reported that her body started shaking, she could not see straight or walk straight and was unable to control her shaking.  She denies having any acute illness, fever or chills, she does not sleep well and notes significant pain as if her \"body is on fire\".  She does get headaches couple times a week however notes they are generally related to her high blood pressure.  There are a pulsation type pain, mostly to the right side of her head.  She will occasionally have similar pain to the left side of her head.  She takes combination Advil Tylenol, this is helpful to alleviate the pain.  Patient has not been on her thyroid medication for multiple months, possible side effect of to be seen hyperthyroidism.  Patient stated she did go to the endocrinologist to try to obtain an appointment for ongoing thyroid management.  Patient has no significant neurological deficits, she is slow to respond and slow to perform finger-to-nose and or alternating movement testing.  Patient's speech is slow and soft.  Patient stated that she is on blood pressure medication however notes she did not start the lisinopril as she had gone to her doctor's office and they ordered it but she did not feel as though she was adequately evaluated so was concerned about starting them.  Patient did have high blood pressure during her stay in the ER, during my assessment blood pressure was 189/94.  She is negative for Romberg, no ataxia noted on her exam.  Her strength was equal however she did have some diminished strength in the left upper extremity " due to reports of left shoulder pain and neck pain.  Patient received full dose aspirin and high-dose statin while in the ER, she is currently on baby aspirin and cholesterol medication for possible CVA in light of speech and left-sided numbness.  She is pending for MRI which will be done first thing in the morning.  Recommend patient be seen by endocrinology for further evaluation of her thyroid, possible treatment for sinus disease however patient reports being treated with Augmentin approximately 3 to 4 weeks ago for sinus infection.    Past Medical History:   Diagnosis Date    Asthma     Disease of thyroid gland     Hypertension        Past Surgical History:   Procedure Laterality Date    TUBAL LIGATION         Allergies   Allergen Reactions    Shellfish-Derived Products - Food Allergy Hives         Current Facility-Administered Medications:     acetaminophen (TYLENOL) tablet 650 mg, 650 mg, Oral, Q6H PRN, ALLEN Nesbitt, 650 mg at 05/07/24 0536    aspirin chewable tablet 81 mg, 81 mg, Oral, Daily, Brigitte Orozco, JENNNP, 81 mg at 05/07/24 0908    atorvastatin (LIPITOR) tablet 40 mg, 40 mg, Oral, Daily With Dinner, JENN NesbittNP, 40 mg at 05/07/24 1611    enoxaparin (LOVENOX) subcutaneous injection 40 mg, 40 mg, Subcutaneous, Daily, JENN NesbittNP, 40 mg at 05/07/24 0909    nicotine (NICODERM CQ) 14 mg/24hr TD 24 hr patch 1 patch, 1 patch, Transdermal, Daily, ALLEN Nesbitt    ondansetron (ZOFRAN) injection 4 mg, 4 mg, Intravenous, Q6H PRN, ALLEN Nesbitt    timolol (TIMOPTIC) 0.5 % ophthalmic solution 1 drop, 1 drop, Both Eyes, Daily, ALLEN Nesbitt, 1 drop at 05/07/24 0909    Social History     Socioeconomic History    Marital status: /Civil Union     Spouse name: Not on file    Number of children: Not on file    Years of education: Not on file    Highest education level: Not on file   Occupational History    Not on file   Tobacco Use    Smoking status: Every Day     Current  "packs/day: 0.50     Types: Cigarettes    Smokeless tobacco: Never   Vaping Use    Vaping status: Never Used   Substance and Sexual Activity    Alcohol use: Not Currently    Drug use: Yes     Types: Marijuana    Sexual activity: Not on file   Other Topics Concern    Not on file   Social History Narrative    Not on file     Social Determinants of Health     Financial Resource Strain: Not on file   Food Insecurity: No Food Insecurity (5/7/2024)    Hunger Vital Sign     Worried About Running Out of Food in the Last Year: Never true     Ran Out of Food in the Last Year: Never true   Transportation Needs: No Transportation Needs (5/7/2024)    PRAPARE - Transportation     Lack of Transportation (Medical): No     Lack of Transportation (Non-Medical): No   Physical Activity: Not on file   Stress: Not on file   Social Connections: Not on file   Intimate Partner Violence: Not on file   Housing Stability: Low Risk  (5/7/2024)    Housing Stability Vital Sign     Unable to Pay for Housing in the Last Year: No     Number of Places Lived in the Last Year: 1     Unstable Housing in the Last Year: No       History reviewed. No pertinent family history.      Review of systems:  Please see HPI for positive symptoms.   Constitutional: No fever, no chills, no weight change.   + Dizziness with ataxia  + Fatigue, difficulty sleeping  + Feeling like \"body is on fire\"  Ocular: No diplopia, + blurred vision, spots/zigzag lines  + Vision changes x 8 days  HEENT:  No sore throat, headache or congestion.   + Sinus pressure and pain  COR:  No chest pain. No palpitations.   Lungs:  no sob  GI:  no  nausea, no vomiting, no diarrhea, no constipation, no anorexia.   :  No dysuria, frequency, or urgency. No hematuria.    Musculoskeletal:  No joint swelling   + Body shaking  + Left shoulder and neck pain  Skin:  No rash or itching.   Psychiatric:  no anxiety, no depression.   Endocrine:  No polyuria or polydipsia.    Physical examination:  /81  " " Pulse 63   Temp 97.8 °F (36.6 °C)   Resp 18   Ht 5' 4\" (1.626 m)   Wt 61.2 kg (135 lb)   SpO2 98%   BMI 23.17 kg/m²     GENERAL APPEARANCE:  The patient is alert, oriented.    HEENT:  Head is normocephalic.  Pupils are equal and reactive.    NECK:  Supple without lymphadenopathy.   HEART:  Regular rate and rhythm.  LUNGS: No audible wheezing or stridor heard   ABDOMEN:  Soft, nontender, nondistended.    EXTREMITIES:  Without cyanosis, clubbing or edema.     Mental status:  The patient is alert, attentive, and oriented.   Speech is clear and fluent, good repetition, comprehension, and naming.   Cranial nerves:  CN II: Visual fields are full to confrontation.   Fundoscopic exam is normal with sharp discs and no vascular changes.  Pupils are 3 mm and briskly reactive to light.   CN III, IV, VI: At primary gaze, there is no eye deviation.   CN V: Facial sensation is intact tin all 3 divisions bilaterally.   Corneal responses are intact.  CN VII: Face is symmetric with normal eye closure and smile.  CN VIII: Hearing is normal to rubbing fingers  CN IX, X: Palate elevates symmetrically. Phonation is normal.  CN XI: Head turning and shoulder shrug are intact  CN XII: Tongue is midline with normal movements and no atrophy.  Motor:  There is no pronator drift of out-stretched arms.    Muscle bulk and tone are normal.   Patient's muscle strength is relatively normal has increased pain to the left neck and shoulder causing some limitations and giveaway of the left upper extremity.  Muscle exam  Arm Right Left Leg Right Left   Deltoid 5/5 5-/5 Iliopsoas 5/5 5/5   Biceps 5/5 5-/5 Quads 5/5 5/5   Triceps 5/5 5-/5 Hamstrings 5/5 5/5   Wrist Extension 5/5 5/5 Ankle Dorsi Flexion 5/5 5/5   Wrist Flexion 5/5 5/5 Ankle Plantar Flexion 5/5 5/5        Reflexes    RJ BJ TJ KJ AJ Plantars Garg's   Right 2+ 2+  1+ tr Downgoing Not present   Left 2+ 2+  1+ tr Downgoing Not present      Sensory:  Light touch, Temperature, position " sense, and vibration sense are intact in fingers and toes.  Coordination:  Rapid alternating movements and fine finger movements are completed slowly.   There is no dysmetria on finger-to-nose and heel-knee-shin.   There are no abnormal or extraneous movements.   Romberg negative.  Gait/Stance:  Deferred while in ER    Lab Results   Component Value Date    WBC 7.42 05/06/2024    HGB 13.3 05/06/2024    HCT 40.4 05/06/2024    MCV 98 05/06/2024     05/06/2024     Lab Results   Component Value Date    HGBA1C 5.0 05/06/2024     Lab Results   Component Value Date    ALT 13 05/06/2024    AST 12 (L) 05/06/2024    ALKPHOS 58 05/06/2024     Lab Results   Component Value Date    CALCIUM 9.1 05/07/2024    K 3.8 05/07/2024    CO2 28 05/07/2024     05/07/2024    BUN 11 05/07/2024    CREATININE 0.66 05/07/2024     Cholesterol 142  LDL 76    Review of reports and notes reveal:  Independent Interpretation of images or specimens:  XR chest 1 view portable    Result Date: 5/7/2024  No acute cardiopulmonary disease. Workstation performed: [id number]     CTA head and neck with and without contrast    Result Date: 5/6/2024  1. No acute intracranial abnormality. 2. No proximal large vessel occlusion in the head and neck. 3. No significant cervical carotid stenosis. Workstation performed: [id number]           Thank you for this consult.    Total time of encounter: 70 Minutes  More than 50% of time was spent in counseling and coordination of care of patient.  Discussed with patient and her family at bedside, medical team and attending neurology MD.

## 2024-05-07 NOTE — ED PROVIDER NOTES
History  Chief Complaint   Patient presents with    Dizziness     Had  orthopedic visit this am for radiculopathy, states about 2pm had episode of dizziness and chest pressure, relating many different complaints from recent to days ago     Patient is a 48-year-old female, past medical history including asthma, and hypertension, who presents to the emergency department for sudden onset dizziness, left-sided numbness, difficulty walking.  Symptoms started suddenly around 2 PM.  Lasted for about 2 hours and completely resolved.  Currently she states she feels off but otherwise has no complaints.  Has been able to walk.  Denies any chest pain or shortness of breath.  No fevers or chills.  No recent illnesses.  No prior history of symptoms like this in the past.  She has no other complaints or concerns.    Initial NIH: 0. No stroke alert indicated given resolution of symptoms.        Prior to Admission Medications   Prescriptions Last Dose Informant Patient Reported? Taking?   albuterol (PROVENTIL HFA,VENTOLIN HFA) 90 mcg/act inhaler   Yes No   Sig: Inhale 2 puffs every 6 (six) hours as needed for wheezing   amLODIPine (NORVASC) 10 mg tablet 2024  Yes Yes   Sig: Take 10 mg by mouth daily   azelastine (ASTELIN) 0.1 % nasal spray   No No   Si spray into each nostril 2 (two) times a day Use in each nostril as directed   cyclobenzaprine (FLEXERIL) 10 mg tablet   Yes No   Sig: Take 10 mg by mouth daily as needed   Patient not taking: Reported on 10/30/2023   fluocinolone acetonide (DermOtic) 0.01 % otic oil   No No   Sig: Administer 5 drops into both ears 2 (two) times a day   fluticasone (FLONASE) 50 mcg/act nasal spray   No No   Si spray into each nostril daily   Patient not taking: Reported on 10/30/2023   lisinopril (ZESTRIL) 10 mg tablet   Yes No   Sig: Take 10 mg by mouth daily   Patient not taking: Reported on 10/30/2023   methimazole (TAPAZOLE) 10 mg tablet More than a month  Yes No   Sig: Take 10 mg by  mouth in the morning   Patient not taking: Reported on 3/14/2024   naproxen (NAPROSYN) 500 mg tablet   No No   Sig: Take 1 tablet (500 mg total) by mouth 2 (two) times a day with meals for 7 days   timolol (TIMOPTIC) 0.5 % ophthalmic solution 5/6/2024  Yes Yes   Sig: INSTILL 1 DROP INTO BOTH EYES IN THE MORNING      Facility-Administered Medications: None       Past Medical History:   Diagnosis Date    Asthma     Disease of thyroid gland     Hypertension        Past Surgical History:   Procedure Laterality Date    TUBAL LIGATION         History reviewed. No pertinent family history.  I have reviewed and agree with the history as documented.    E-Cigarette/Vaping    E-Cigarette Use Never User      E-Cigarette/Vaping Substances    Nicotine No     THC No     CBD No     Flavoring No     Other No     Unknown No      Social History     Tobacco Use    Smoking status: Every Day     Current packs/day: 0.50     Types: Cigarettes    Smokeless tobacco: Never   Vaping Use    Vaping status: Never Used   Substance Use Topics    Alcohol use: Not Currently    Drug use: Yes     Types: Marijuana       Review of Systems   Constitutional:  Negative for chills and fever.   Neurological:  Positive for dizziness, weakness and numbness.   All other systems reviewed and are negative.      Physical Exam  Physical Exam  Vitals and nursing note reviewed.   Constitutional:       General: She is not in acute distress.     Appearance: She is well-developed. She is not ill-appearing, toxic-appearing or diaphoretic.   HENT:      Head: Normocephalic and atraumatic.      Right Ear: External ear normal.      Left Ear: External ear normal.      Nose: Nose normal.   Eyes:      General: Lids are normal. No scleral icterus.  Cardiovascular:      Rate and Rhythm: Normal rate and regular rhythm.      Heart sounds: Normal heart sounds. No murmur heard.     No friction rub. No gallop.   Pulmonary:      Effort: Pulmonary effort is normal. No respiratory  distress.      Breath sounds: Normal breath sounds. No wheezing or rales.   Abdominal:      Palpations: Abdomen is soft.      Tenderness: There is no abdominal tenderness. There is no guarding or rebound.   Musculoskeletal:         General: No deformity. Normal range of motion.      Cervical back: Normal range of motion and neck supple.   Skin:     General: Skin is warm and dry.   Neurological:      General: No focal deficit present.      Mental Status: She is alert and oriented to person, place, and time.      GCS: GCS eye subscore is 4. GCS verbal subscore is 5. GCS motor subscore is 6.      Cranial Nerves: Cranial nerves 2-12 are intact. No cranial nerve deficit, dysarthria or facial asymmetry.      Sensory: Sensation is intact. No sensory deficit.      Motor: Motor function is intact. No weakness.   Psychiatric:         Mood and Affect: Mood normal.         Behavior: Behavior normal.         Vital Signs  ED Triage Vitals [05/06/24 1725]   Temperature Pulse Respirations Blood Pressure SpO2   98 °F (36.7 °C) 82 20 144/77 98 %      Temp Source Heart Rate Source Patient Position - Orthostatic VS BP Location FiO2 (%)   Tympanic Monitor Sitting Right arm --      Pain Score       --           Vitals:    05/06/24 2130 05/06/24 2200 05/06/24 2230 05/06/24 2300   BP: 132/84 149/95 138/82 137/83   Pulse: 72 74 64 66   Patient Position - Orthostatic VS:             Visual Acuity  Visual Acuity      Flowsheet Row Most Recent Value   L Pupil Size (mm) 3   R Pupil Size (mm) 3            ED Medications  Medications   atorvastatin (LIPITOR) tablet 40 mg (has no administration in time range)   timolol (TIMOPTIC) 0.5 % ophthalmic solution 1 drop (has no administration in time range)   acetaminophen (TYLENOL) tablet 650 mg (has no administration in time range)   ondansetron (ZOFRAN) injection 4 mg (has no administration in time range)   nicotine (NICODERM CQ) 14 mg/24hr TD 24 hr patch 1 patch (has no administration in time range)    aspirin chewable tablet 81 mg (has no administration in time range)   enoxaparin (LOVENOX) subcutaneous injection 40 mg (has no administration in time range)   iohexol (OMNIPAQUE) 350 MG/ML injection (MULTI-DOSE) 85 mL (85 mL Intravenous Given 5/6/24 2036)   aspirin tablet 325 mg (325 mg Oral Given 5/6/24 2157)       Diagnostic Studies  Results Reviewed       Procedure Component Value Units Date/Time    Hemoglobin A1c w/EAG Estimation [[id number]] Collected: 05/06/24 1905    Lab Status: In process Specimen: Blood from Arm, Right Updated: 05/06/24 2255    HS Troponin I 2hr [[id number]]  (Normal) Collected: 05/06/24 2055    Lab Status: Final result Specimen: Blood from Arm, Right Updated: 05/06/24 2127     hs TnI 2hr 4 ng/L      Delta 2hr hsTnI 0 ng/L     hCG, qualitative pregnancy [[id number]]  (Normal) Collected: 05/06/24 1905    Lab Status: Final result Specimen: Blood from Arm, Right Updated: 05/06/24 1945     Preg, Serum Negative    HS Troponin 0hr (reflex protocol) [[id number]]  (Normal) Collected: 05/06/24 1905    Lab Status: Final result Specimen: Blood from Arm, Right Updated: 05/06/24 1944     hs TnI 0hr 4 ng/L     Comprehensive metabolic panel [[id number]]  (Abnormal) Collected: 05/06/24 1905    Lab Status: Final result Specimen: Blood from Arm, Right Updated: 05/06/24 1936     Sodium 139 mmol/L      Potassium 3.9 mmol/L      Chloride 108 mmol/L      CO2 26 mmol/L      ANION GAP 5 mmol/L      BUN 9 mg/dL      Creatinine 0.63 mg/dL      Glucose 89 mg/dL      Calcium 9.2 mg/dL      AST 12 U/L      ALT 13 U/L      Alkaline Phosphatase 58 U/L      Total Protein 7.4 g/dL      Albumin 4.2 g/dL      Total Bilirubin 0.25 mg/dL      eGFR 105 ml/min/1.73sq m     Narrative:      National Kid[employer] guidelines for Chronic Kidney Disease (CKD):     Stage 1 with normal or high GFR (GFR > 90 mL/min/1.73 square meters)    Stage 2 Mild CKD (GFR = 60-89 mL/min/1.73 square meters)    Stage 3A Moderate CKD (GFR =  45-59 mL/min/1.73 square meters)    Stage 3B Moderate CKD (GFR = 30-44 mL/min/1.73 square meters)    Stage 4 Severe CKD (GFR = 15-29 mL/min/1.73 square meters)    Stage 5 End Stage CKD (GFR <15 mL/min/1.73 square meters)  Note: GFR calculation is accurate only with a steady state creatinine    CBC and differential [[id number]] Collected: 05/06/24 1905    Lab Status: Final result Specimen: Blood from Arm, Right Updated: 05/06/24 1918     WBC 7.42 Thousand/uL      RBC 4.12 Million/uL      Hemoglobin 13.3 g/dL      Hematocrit 40.4 %      MCV 98 fL      MCH 32.3 pg      MCHC 32.9 g/dL      RDW 12.8 %      MPV 9.6 fL      Platelets 281 Thousands/uL      nRBC 0 /100 WBCs      Segmented % 55 %      Immature Grans % 0 %      Lymphocytes % 34 %      Monocytes % 8 %      Eosinophils Relative 2 %      Basophils Relative 1 %      Absolute Neutrophils 4.09 Thousands/µL      Absolute Immature Grans 0.01 Thousand/uL      Absolute Lymphocytes 2.53 Thousands/µL      Absolute Monocytes 0.59 Thousand/µL      Eosinophils Absolute 0.15 Thousand/µL      Basophils Absolute 0.05 Thousands/µL                    CTA head and neck with and without contrast   Final Result by Shiv Asif MD (05/06 2106)      1. No acute intracranial abnormality.      2. No proximal large vessel occlusion in the head and neck.      3. No significant cervical carotid stenosis.            Workstation performed: [id number]         XR chest 1 view portable    (Results Pending)   MRI Inpatient Order    (Results Pending)              Procedures  ECG 12 Lead Documentation Only    Date/Time: 5/6/2024 9:58 PM    Performed by: Yifan Sam DO  Authorized by: Yifan Sam DO    ECG reviewed by me, the ED Provider: yes    Patient location:  ED  Interpretation:     Interpretation: normal    Rate:     ECG rate:  73    ECG rate assessment: normal    Rhythm:     Rhythm: sinus rhythm    Ectopy:     Ectopy: none    QRS:     QRS axis:  Normal  Conduction:      Conduction: normal    ST segments:     ST segments:  Normal  T waves:     T waves: normal             ED Course  ED Course as of 05/06/24 2304   Mon May 06, 2024   2109 CTA head and neck with and without contrast  1. No acute intracranial abnormality.     2. No proximal large vessel occlusion in the head and neck.     3. No significant cervical carotid stenosis.     2127 Discussed with SLIM. Accepts admission                  Stroke Assessment       Row Name 05/06/24 2159             NIH Stroke Scale    Interval Baseline      Level of Consciousness (1a.) 0      LOC Questions (1b.) 0      LOC Commands (1c.) 0      Best Gaze (2.) 0      Visual (3.) 0      Facial Palsy (4.) 0      Motor Arm, Left (5a.) 0      Motor Arm, Right (5b.) 0      Motor Leg, Left (6a.) 0      Motor Leg, Right (6b.) 0      Limb Ataxia (7.) 0      Sensory (8.) 0      Best Language (9.) 0      Dysarthria (10.) 0      Extinction and Inattention (11.) (Formerly Neglect) 0      Total 0                    Flowsheet Row Most Recent Value   Thrombolytic Decision Options    Thrombolytic Decision Patient not a candidate.   Patient is not a candidate options Symptoms resolved/clearly non disabling.                                    Medical Decision Making  Patient is a 48 y.o. female who presents to the ED for dizziness, left-sided numbness, difficulty walking, all resolved.  Patient is nontoxic and well-appearing.  Vitals are stable.  On my exam patient has no symptoms.    Symptoms concerning for acute CVA versus TIA. EKG without evidence of STEMI or ischemia, fingerstick BS not hypoglycemic, and clinical picture does not suggest other stroke mimic. I considered, but think less likely that symptoms are secondary to a dissection, AMI, hypoglycemia, other metabolic derangement including hepatic/uremic encephalopathy, medication side effect, or post-ictal Mikel's paralysis.     Plan: CT CTA Head and Neck w/ and w/out contrast, stroke labs, EKG, admit          "        Portions of the record may have been created with voice recognition software. Occasional wrong word or \"sound a like\" substitutions may have occurred due to the inherent limitations of voice recognition software. Read the chart carefully and recognize, using context, where substitutions have occurred.    Problems Addressed:  Dizziness: acute illness or injury    Amount and/or Complexity of Data Reviewed  Labs: ordered.  Radiology: ordered. Decision-making details documented in ED Course.    Risk  OTC drugs.  Prescription drug management.  Decision regarding hospitalization.             Disposition  Final diagnoses:   Dizziness   Numbness     Time reflects when diagnosis was documented in both MDM as applicable and the Disposition within this note       Time User Action Codes Description Comment    5/6/2024  9:38 PM Yifan Sam [R42] Dizziness     5/6/2024 10:00 PM Yifan Sam [R20.0] Numbness     5/6/2024 10:16 PM Brigitte Orozco Add [R29.90] Stroke-like symptoms     5/6/2024 10:19 PM Brigitte Orozco Add [E05.90] Hyperthyroidism           ED Disposition       ED Disposition   Admit    Condition   Stable    Date/Time   Mon May 6, 2024  9:59 PM    Comment   Case was discussed with LITO  and the patient's admission status was agreed to be Admission Status: observation status to the service of Dr. Patel .               Follow-up Information    None         Patient's Medications   Discharge Prescriptions    No medications on file       No discharge procedures on file.    PDMP Review       None            ED Provider  Electronically Signed by             Yifan Sam DO  05/06/24 2304    "

## 2024-05-07 NOTE — ED NOTES
Patient resting quietly in bed, using cell phone.  Aware awaiting bed placement.  Side rails up x 1.  Call bell within reach.  Patient only c/o of a headache at this time.         Juan Doran RN  05/07/24 0799

## 2024-05-08 ENCOUNTER — APPOINTMENT (OUTPATIENT)
Dept: RADIOLOGY | Facility: HOSPITAL | Age: 49
End: 2024-05-08
Payer: COMMERCIAL

## 2024-05-08 VITALS
OXYGEN SATURATION: 98 % | BODY MASS INDEX: 23.05 KG/M2 | SYSTOLIC BLOOD PRESSURE: 137 MMHG | TEMPERATURE: 98.1 F | RESPIRATION RATE: 18 BRPM | HEART RATE: 64 BPM | DIASTOLIC BLOOD PRESSURE: 88 MMHG | WEIGHT: 135 LBS | HEIGHT: 64 IN

## 2024-05-08 LAB
CRP SERPL QL: 1.2 MG/L
ERYTHROCYTE [SEDIMENTATION RATE] IN BLOOD: 9 MM/HOUR (ref 0–19)

## 2024-05-08 PROCEDURE — 85652 RBC SED RATE AUTOMATED: CPT | Performed by: PSYCHIATRY & NEUROLOGY

## 2024-05-08 PROCEDURE — 70551 MRI BRAIN STEM W/O DYE: CPT

## 2024-05-08 PROCEDURE — 97162 PT EVAL MOD COMPLEX 30 MIN: CPT

## 2024-05-08 PROCEDURE — 99239 HOSP IP/OBS DSCHRG MGMT >30: CPT | Performed by: INTERNAL MEDICINE

## 2024-05-08 PROCEDURE — 86037 ANCA TITER EACH ANTIBODY: CPT | Performed by: PSYCHIATRY & NEUROLOGY

## 2024-05-08 PROCEDURE — 97116 GAIT TRAINING THERAPY: CPT

## 2024-05-08 PROCEDURE — 86618 LYME DISEASE ANTIBODY: CPT | Performed by: PSYCHIATRY & NEUROLOGY

## 2024-05-08 PROCEDURE — 97166 OT EVAL MOD COMPLEX 45 MIN: CPT

## 2024-05-08 PROCEDURE — 83520 IMMUNOASSAY QUANT NOS NONAB: CPT | Performed by: PSYCHIATRY & NEUROLOGY

## 2024-05-08 PROCEDURE — 99232 SBSQ HOSP IP/OBS MODERATE 35: CPT | Performed by: PSYCHIATRY & NEUROLOGY

## 2024-05-08 PROCEDURE — 82164 ANGIOTENSIN I ENZYME TEST: CPT | Performed by: PSYCHIATRY & NEUROLOGY

## 2024-05-08 PROCEDURE — 86140 C-REACTIVE PROTEIN: CPT | Performed by: PSYCHIATRY & NEUROLOGY

## 2024-05-08 RX ORDER — ASPIRIN 81 MG/1
81 TABLET, CHEWABLE ORAL DAILY
Qty: 30 TABLET | Refills: 0 | Status: SHIPPED | OUTPATIENT
Start: 2024-05-09 | End: 2024-05-17 | Stop reason: SDUPTHER

## 2024-05-08 RX ORDER — NICOTINE 21 MG/24HR
1 PATCH, TRANSDERMAL 24 HOURS TRANSDERMAL DAILY
Qty: 28 PATCH | Refills: 0 | Status: SHIPPED | OUTPATIENT
Start: 2024-05-09 | End: 2024-05-17

## 2024-05-08 RX ORDER — METHIMAZOLE 5 MG/1
2.5 TABLET ORAL DAILY
Qty: 30 TABLET | Refills: 1 | Status: SHIPPED | OUTPATIENT
Start: 2024-05-08 | End: 2024-05-17 | Stop reason: SDUPTHER

## 2024-05-08 RX ORDER — ATORVASTATIN CALCIUM 40 MG/1
40 TABLET, FILM COATED ORAL
Qty: 30 TABLET | Refills: 0 | Status: SHIPPED | OUTPATIENT
Start: 2024-05-08 | End: 2024-05-17 | Stop reason: SDUPTHER

## 2024-05-08 RX ADMIN — TIMOLOL MALEATE 1 DROP: 5 SOLUTION OPHTHALMIC at 10:50

## 2024-05-08 RX ADMIN — ACETAMINOPHEN 650 MG: 325 TABLET ORAL at 08:26

## 2024-05-08 RX ADMIN — ASPIRIN 81 MG CHEWABLE TABLET 81 MG: 81 TABLET CHEWABLE at 09:47

## 2024-05-08 NOTE — NURSING NOTE
AVS printed and reviewed with the patient at the bedside. An opportunity for questions was provided. Discharge instructions acknowledged by the patient who is discharged to home in stable condition.

## 2024-05-08 NOTE — PHYSICAL THERAPY NOTE
PHYSICAL THERAPY EVALUATION/TREATMENT       05/08/24 1108   PT Last Visit   PT Visit Date 05/08/24   Note Type   Note type Evaluation   Pain Assessment   Pain Assessment Tool 0-10   Pain Score 5   Pain Location/Orientation Location: Head   Pain Onset/Description Descriptor: Headache   Patient's Stated Pain Goal No pain   Hospital Pain Intervention(s) Repositioned;Ambulation/increased activity   Multiple Pain Sites No   Restrictions/Precautions   Weight Bearing Precautions Per Order No   Other Precautions Bed Alarm;Chair Alarm;Fall Risk;Pain   Home Living   Type of Home House   Home Layout Two level;Bed/bath upstairs;Stairs to enter with rails  (12 ALENA)   Bathroom Toilet Standard   Additional Comments no DME, IND PTA   Prior Function   Level of [place] Independent with ADLs;Independent with functional mobility;Independent with IADLS   Lives With Spouse;Family  (7 children)   Receives Help From Family   IADLs Independent with driving;Independent with meal prep;Independent with medication management   Falls in the last 6 months 0   General   Additional Pertinent History Pt is a 48 year-old female who was admitted to the hospital on 5/6/24 due to dizziness, possible TIA. MRI revealed Moderate scattered periventricular white matter hyperintensities within the cerebral hemispheres, somewhat atypical for a patient of this age. These are primarily peripheral in location affecting the frontal and parietal lobes sparing the basal ganglia   and brainstem   Family/Caregiver Present Yes   Cognition   Overall Cognitive Status WFL   Arousal/Participation Cooperative   Orientation Level Oriented X4   Following Commands Follows all commands and directions without difficulty   Subjective   Subjective Pt agreeable to PT session this AM. Pt reports numbness/tingling has resolved.   RLE Assessment   RLE Assessment WFL   LLE Assessment   LLE Assessment WFL   Bed Mobility   Supine to Sit 7  Independent   Sit to Supine 7   Independent   Transfers   Sit to Stand 5  Supervision   Stand to Sit 5  Supervision   Ambulation/Elevation   Gait pattern Foward flexed;Short stride;Step through pattern  (decreased gait speed ; pt reports BLE weakness)   Gait Assistance 4  Minimal assist   Additional items Verbal cues   Assistive Device None  (intermittent hand held assist)   Distance 50 feet x 2   Stair Management Assistance 5  Supervision   Additional items Assist x 1;Verbal cues   Stair Management Technique One rail R;Reciprocal;Step to pattern  (reciprocal pattern to ascend, step to pattenr to descend)   Number of Stairs 4   Balance   Static Sitting Fair +   Dynamic Sitting Fair   Static Standing Fair   Dynamic Standing Fair -   Ambulatory Fair -   Activity Tolerance   Activity Tolerance Patient limited by fatigue   Nurse Made Aware yes RN Jennifer   Assessment   Prognosis Good   Problem List Decreased strength;Decreased endurance;Impaired balance;Decreased mobility;Decreased safety awareness;Pain   Assessment Patient seen for Physical Therapy evaluation. Patient admitted with Stroke-like symptoms.  Comorbidities affecting patient's physical performance include: asthma and hypertension.  Personal factors affecting patient at time of initial evaluation include: lives in multi story house, ambulating with assistive device, stairs to enter home, inability to navigate level surfaces without external assistance, and inability to perform IADLS . Prior to admission, patient was independent with functional mobility without assistive device, independent with ADLS, independent with IADLS, living with spouse + family in a multi level home with 12 steps to enter, ambulating household distance, and ambulating community distances.  Please find objective findings from Physical Therapy assessment regarding body systems outlined above with impairments and limitations including weakness, impaired balance, decreased endurance, gait deviations, decreased activity  tolerance, decreased functional mobility tolerance, decreased safety awareness, and fall risk.  The Barthel Index was used as a functional outcome tool presenting with a score of Barthel Index Score: 65 today indicating moderate limitations of functional mobility and ADLS.  Patient's clinical presentation is currently evolving as seen in patient's presentation of increased fall risk, new onset of impairment of functional mobility, and decreased endurance. Pt would benefit from continued Physical Therapy treatment to address deficits as defined above and maximize level of functional mobility. As demonstrated by objective findings, the assigned level of complexity for this evaluation is moderate.The patient's -[facility] Basic Mobility Inpatient Short Form Raw Score is 20. A Raw score of greater than 16 suggests the patient may benefit from discharge to home. Please also refer to the recommendation of the Physical Therapist for safe discharge planning.   Goals   Patient Goals to feel better   STG Expiration Date 05/15/24   Short Term Goal #1 Pt will perform bed mobility/transfers IND ; pt will ambulate x 150 feet Mod I with cane ; Pt will negotiate x 20+ steps with supervision + rail to enter/exit home ; Standing balance will improve to fair+ to decrease risk of falls ; AMPAC score will improve >22/24 to demonstrate improved functional independence   LTG Expiration Date 05/22/24   Long Term Goal #1 pt will ambulate x 250 feet Mod I with cane vs no AD ; Pt will negotiate x 20+ steps Mod I + rail to enter/exit home ; Standing balance will improve to good to decrease risk of falls   Plan   Treatment/Interventions Functional transfer training;ADL retraining;LE strengthening/ROM;Therapeutic exercise;Endurance training;Bed mobility;Gait training;Spoke to nursing;OT   PT Frequency Other (Comment)  (5x/week)   Discharge Recommendation   Rehab Resource Intensity Level, PT III (Minimum Resource Intensity)   Additional Comments cane  provided during session   AM-PAC Basic Mobility Inpatient   Turning in Flat Bed Without Bedrails 4   Lying on Back to Sitting on Edge of Flat Bed Without Bedrails 4   Moving Bed to Chair 3   Standing Up From Chair Using Arms 3   Walk in Room 3   Climb 3-5 Stairs With Railing 3   Basic Mobility Inpatient Raw Score 20   Basic Mobility Standardized Score 43.99   [facility] Highest Level Of Mobility   -HL Goal 6: Walk 10 steps or more   -HLM Achieved 7: Walk 25 feet or more   Modified Geneva Scale   Modified Felicia Scale 4   Barthel Index   Feeding 10   Bathing 0   Grooming Score 0   Dressing Score 5   Bladder Score 10   Bowels Score 10   Toilet Use Score 5   Transfers (Bed/Chair) Score 10   Mobility (Level Surface) Score 10   Stairs Score 5   Barthel Index Score 65   Additional Treatment Session   Start Time 1058   End Time 1108   Treatment Assessment S: Pt agreeable to attempt ambulation with cane O/A: Pt able to ambulate additional 50 feet with supervision using straight cane. Requires intermittent verbal cues for proper sequencing with good response, mild improvement in stability when ambulating. Pt fatigued, requesting to return to bed. P: Pt will benefit from skilled PT to address deficits to maximize IND for safe d/c   Equipment Use cane   End of Consult   Patient Position at End of Consult Supine;All needs within reach   Licensure   [place] License Number  Savanna Eris [id number]

## 2024-05-08 NOTE — PLAN OF CARE
Problem: PAIN - ADULT  Goal: Verbalizes/displays adequate comfort level or baseline comfort level  Description: Interventions:  - Encourage patient to monitor pain and request assistance  - Assess pain using appropriate pain scale  - Administer analgesics based on type and severity of pain and evaluate response  - Implement non-pharmacological measures as appropriate and evaluate response  - Consider cultural and social influences on pain and pain management  - Notify physician/advanced practitioner if interventions unsuccessful or patient reports new pain  Outcome: Progressing     Problem: INFECTION - ADULT  Goal: Absence or prevention of progression during hospitalization  Description: INTERVENTIONS:  - Assess and monitor for signs and symptoms of infection  - Monitor lab/diagnostic results  - Monitor all insertion sites, i.e. indwelling lines, tubes, and drains  - Monitor endotracheal if appropriate and nasal secretions for changes in amount and color  - [place] appropriate cooling/warming therapies per order  - Administer medications as ordered  - Instruct and encourage patient and family to use good hand hygiene technique  - Identify and instruct in appropriate isolation precautions for identified infection/condition  Outcome: Progressing     Problem: SAFETY ADULT  Goal: Patient will remain free of falls  Description: INTERVENTIONS:  - Educate patient/family on patient safety including physical limitations  - Instruct patient to call for assistance with activity   - Consult OT/PT to assist with strengthening/mobility   - Keep Call bell within reach  - Keep bed low and locked with side rails adjusted as appropriate  - Keep care items and personal belongings within reach  - Initiate and maintain comfort rounds  - Make Fall Risk Sign visible to staff  - Offer Toileting every 2 Hours, in advance of need  - Initiate/Maintain bed/chair alarm  - Obtain necessary fall risk management equipment:  bed/chair alarm  -  Apply yellow socks and bracelet for high fall risk patients  - Consider moving patient to room near nurses station  Outcome: Progressing     Problem: DISCHARGE PLANNING  Goal: Discharge to home or other facility with appropriate resources  Description: INTERVENTIONS:  - Identify barriers to discharge w/patient and caregiver  - Arrange for needed discharge resources and transportation as appropriate  - Identify discharge learning needs (meds, wound care, etc.)  - Arrange for interpretive services to assist at discharge as needed  - Refer to Case Management Department for coordinating discharge planning if the patient needs post-hospital services based on physician/advanced practitioner order or complex needs related to functional status, cognitive ability, or social support system  Outcome: Progressing     Problem: Knowledge Deficit  Goal: Patient/family/caregiver demonstrates understanding of disease process, treatment plan, medications, and discharge instructions  Description: Complete learning assessment and assess knowledge base.  Interventions:  - Provide teaching at level of understanding  - Provide teaching via preferred learning methods  Outcome: Progressing     Problem: NEUROSENSORY - ADULT  Goal: Achieves stable or improved neurological status  Description: INTERVENTIONS  - Monitor and report changes in neurological status  - Monitor vital signs such as temperature, blood pressure, glucose, and any other labs ordered   - Initiate measures to prevent increased intracranial pressure  - Monitor for seizure activity and implement precautions if appropriate      Outcome: Progressing     Problem: NEUROSENSORY - ADULT  Goal: Achieves maximal functionality and self care  Description: INTERVENTIONS  - Monitor swallowing and airway patency with patient fatigue and changes in neurological status  - Encourage and assist patient to increase activity and self care.   - Encourage visually impaired, hearing impaired and  aphasic patients to use assistive/communication devices  Outcome: Progressing

## 2024-05-08 NOTE — ASSESSMENT & PLAN NOTE
Was on Norvasc and lisinopril.  Taking Norvasc intermittently due to lack of refill.  Has not been taking lisinopril for about 1 month.  Made appointment today to see a new PCP within a week.  Patient was started on verapamil per neurology recommendations.  Will discontinue Norvasc and lisinopril

## 2024-05-08 NOTE — PROGRESS NOTES
Neurology Consult Follow Up      Cherie Arias is a 48 y.o. female  [address] [address]/[address] [place] [address]-*    [id number]        Assessment/Recommendations:    1.  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  Lt sided Paresthesia/weakness  3.  Hyperthyroidism  4.  Sinus disease  5.  History of migraines  6.  Glaucoma       -Monitor on telemetry  -Neuro assessments  -Continue baby aspirin 81 mg daily  -Continue atorvastatin 40 mg daily   -MRI of the brain + moderate periventricular white matter hyperintensities w/in cerebral hemisphere, atypical for pt age. Primarily peripheral locations in frontal and parietal lobes.  Consider chronic microangipathic changes, subacute arterioclerotic encephalopathy, chronic CNS vasculitis or CADASIL.  Less likely demyelinating 2nd to patterns of distribution.  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  Atrium normal in size, no PFO or shunt seen  -Lipid profile and A1c  -PT/OT/ST  -Recommend endocrinology consultation for hyperthyroidism, patient is 4-5 months, symptoms possibly related     Patient is a 48-year-old female who came to the ER after having continued bouts of dizziness/lightheadedness with feeling off balance walking.  She had episode of visual changes which have been ongoing for several months, andd developed left-sided numbness with body shaking event on 5/6/2024.  Noted feeling ataxic and a heaviness in the left sided. Lasting about 2hrs and returned to her normal.   /77, NIH =0  TSH 0.291 and reported being off of her thyroid medication x 4 months.  CTA of the head and neck which showed negative vessel disease however did show some effort areas of sinus thickening.    Patient reports that she had recently treated for sinus infection with Augmentin approximately 3 to 4 weeks ago.  Echocardiogram done with EF of 55%.  No  PFO or interatrial communication.  Her atrium are normal in size.    Started on Aspirin and loaded with Plavix x1, Lipitor 40mg started.   Pt had slow movement and responses, some weakness and noted that she had pains in the Lt neck with weakness in the left shoulder. No signficant focal deficits noted.   Pt has had ongoing complaints and health issues including weakness, lightheadedness, vision changes, memory changes and fogginess.  Suspected poss related to thyroid disease. She has high IOP and was placed on gtts for glaucoma.   She continued to feel off balance and ataxic but this am stated that she had been doing better.  MRI of the Brain completed this am and found evidences of white matter lesions in the periventricular region within the cerebral hemispheres which is atypical for her age.   Noted that the peripheral location affects the frontal and parietal lobes and suspicious for CADASIL. Can also consider microangiopathic changes, however, her vascular risk factors are limited.  Subacute aerteriosclerotic encephalopathy or CNS vasculitis (chronic) however ESR and CRP is normal.  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  Lab testing has been sent and is pending.   Pt recommended to remain on BASA and atorvastatin, no further need for Plavix to be started.    Pt aware if no findings from lab studies, will likely need LP in the OP setting for further diagnostic etiology.       Cherie Arias will need follow up in 8 weeks with general attending. She will not require outpatient neurological testing. Dr. Austin would like to follow up and review labs and consider additional testing if needed    Chief Complaint:    Subjective:   Pt reports that she seems to be walking better and denied any significant dizziness or changes. Continues with mild frontal headache.    Past Medical History:   Diagnosis Date    Asthma     Disease of thyroid gland     Hypertension      Social History      Socioeconomic History    Marital status: /Civil Union     Spouse name: Not on file    Number of children: Not on file    Years of education: Not on file    Highest education level: Not on file   Occupational History    Not on file   Tobacco Use    Smoking status: Every Day     Current packs/day: 0.50     Types: Cigarettes    Smokeless tobacco: Never   Vaping Use    Vaping status: Never Used   Substance and Sexual Activity    Alcohol use: Not Currently    Drug use: Yes     Types: Marijuana    Sexual activity: Not on file   Other Topics Concern    Not on file   Social History Narrative    Not on file     Social Determinants of Health     Financial Resource Strain: Not on file   Food Insecurity: No Food Insecurity (5/7/2024)    Hunger Vital Sign     Worried About Running Out of Food in the Last Year: Never true     Ran Out of Food in the Last Year: Never true   Transportation Needs: No Transportation Needs (5/7/2024)    PRAPARE - Transportation     Lack of Transportation (Medical): No     Lack of Transportation (Non-Medical): No   Physical Activity: Not on file   Stress: Not on file   Social Connections: Not on file   Intimate Partner Violence: Not on file   Housing Stability: Low Risk  (5/7/2024)    Housing Stability Vital Sign     Unable to Pay for Housing in the Last Year: No     Number of Places Lived in the Last Year: 1     Unstable Housing in the Last Year: No     History reviewed. No pertinent family history.    ROS:  Please see HPI for positive symptoms.   No fever, no chills, no weight change.   Mild lightheaded  Ocular: No diplopia, no blurred vision, spot/zigzag lines   HEENT:  No sore throat or congestion. No neck pain.   + mild headache  COR:  No chest pain. No palpitations.   Lungs:  no sob  GI:  no  nausea, no vomiting, no diarrhea, no constipation, no anorexia.   :  No dysuria, frequency, or urgency. No hematuria.   Musculoskeletal:  No joint pain or swelling   Skin:  No rash or itching.  "  Psychiatric:  no anxiety, no depression.   Endocrine:  No polyuria or polydipsia.      Objective:  /88 (BP Location: Left arm)   Pulse 64   Temp 98.1 °F (36.7 °C) (Oral)   Resp 18   Ht 5' 4\" (1.626 m)   Wt 61.2 kg (135 lb)   SpO2 99%   BMI 23.17 kg/m²     General: alert   Mental status: oriented x3  Attention: normal  Knowledge: fair  Language and Speech: normal  Cranial nerves:   CN II: Visual fields are full to confrontation.  Visual acuity diminished Fundoscopic exam is normal with sharp discs and no vascular changes.  Pupils are 3 mm and briskly reactive to light.   CN III, IV, VI: At primary gaze, there is no eye deviation.   CN V: Facial sensation is intact in all 3 divisions bilaterally. Corneal responses are intact.  CN VII: Face is symmetrical, with normal eye closure and smile.  CN VIII: Hearing is normal to rubbing fingers  CN IX, X: Palate elevates symmetrically. Phonation is normal.  CN XI: Head turning and shoulder shrug are intact  CN XII: Tongue is midline with normal movements and no atrophy.  Motor:  There is no pronator drift of out-stretched arms.   Muscle bulk and tone are normal.      Muscle exam  Arm Right Left Leg Right Left   Deltoid 4+/5 4/5 Iliopsoas 4+/5 4+/5   Biceps 4+/5 4/5 Quads 4+/5 4+/5   Triceps 4+/5 4/5 Hamstrings 4+/5 4+/5   Wrist Extension 4+/5 4/5 Ankle Dorsi Flexion 4+/5 4+/5   Wrist Flexion 4+/5 4/5 Ankle Plantar Flexion 4+/5 4+/5       Sensory: normal to light touch, temperature, vibration. Proprioception intact  Gait: steady, tandem gait slightly unsteady  Coordination: finger to nose and heel to toe normal     Reflexes    RJ BJ TJ KJ AJ Plantars Garg's   Right 2+ 2+  1+ tr Downgoing Not present   Left 2+ 2+  1+ tr Downgoing Not present      Heart: Regular rate and rhythm  Lung: No auditory wheezing or stridor heard  Abd: soft, non-tender, non-distended   Skin: dry and intact    Labs:      Lab Results   Component Value Date    WBC 7.42 05/06/2024    HGB " 13.3 05/06/2024    HCT 40.4 05/06/2024    MCV 98 05/06/2024     05/06/2024     Lab Results   Component Value Date    HGBA1C 5.0 05/06/2024     Lab Results   Component Value Date    ALT 13 05/06/2024    AST 12 (L) 05/06/2024    ALKPHOS 58 05/06/2024     Lab Results   Component Value Date    CALCIUM 9.1 05/07/2024    K 3.8 05/07/2024    CO2 28 05/07/2024     05/07/2024    BUN 11 05/07/2024    CREATININE 0.66 05/07/2024     Cholesterol 142  LDL      Review of reports and notes reveal:   Independent review of films/reports:  MRI brain wo contrast    Result Date: 5/8/2024  Moderate scattered periventricular white matter hyperintensities within the cerebral hemispheres, somewhat atypical for a patient of this age. These are primarily peripheral in location affecting the frontal and parietal lobes sparing the basal ganglia and brainstem. The findings are nonspecific and differential considerations would include precocious chronic microangiopathic change, subacute arteriosclerotic encephalopathy, CADASIL or sequela of chronic CNS vasculitis. Chronic infectious/inflammatory process including demyelinating disease, considered less likely given the pattern of distribution. Moderate paranasal sinus disease with no air-fluid levels. The study was marked in EPIC for significant notification. Workstation performed: [id number]     XR chest 1 view portable    Result Date: 5/7/2024  No acute cardiopulmonary disease. Workstation performed: [id number]     CTA head and neck with and without contrast    Result Date: 5/6/2024  1. No acute intracranial abnormality. 2. No proximal large vessel occlusion in the head and neck. 3. No significant cervical carotid stenosis. Workstation performed: [id number]         Thank you for this consult.    Total time of encounter:  30 min  More than 50% of the time was used in counseling and/or coordination of care  Extent of couseling and/or coordination of care

## 2024-05-08 NOTE — OCCUPATIONAL THERAPY NOTE
OT EVALUATION       05/08/24 0935   OT Last Visit   OT Visit Date 05/08/24   Note Type   Note type Evaluation   Pain Assessment   Pain Assessment Tool 0-10   Pain Score 6   Pain Location/Orientation Location: Head  (pt reports from having the MRI and nurse gave her tylenol about 35 minutes ago)   Restrictions/Precautions   Other Precautions Chair Alarm;Bed Alarm;Fall Risk   Home Living   Type of Home House  (6 [address])   Home Layout Multi-level  (bedroom and bathroom are on 4th floor of home with 2 full flights of steps)   Bathroom Toilet Standard   Additional Comments pt reports difficulty with doing steps for the last week due to L leg weakness   Prior Function   Level of [place] Independent with ADLs;Independent with functional mobility;Independent with IADLS   Lives With Spouse;Family  (7 children)   Receives Help From Family   IADLs Independent with driving;Independent with meal prep;Independent with medication management   Comments Patient admitted with dizziness, SOB, shaking, speech difficulties and L sided weakness.   Lifestyle   Intrinsic Gratification reading and paint   General   Additional General Comments 7 grandchildren   ADL   Eating Assistance 7  Independent   Grooming Assistance 5  Supervision/Setup   Grooming Deficit Wash/dry hands   UB Bathing Assistance 7  Independent   LB Bathing Assistance 5  Supervision/Setup   UB Dressing Assistance 5  Supervision/Setup   LB Dressing Assistance 5  Supervision/Setup   Toileting Assistance  5  Supervision/Setup   Bed Mobility   Supine to Sit 5  Supervision   Sit to Supine 5  Supervision   Transfers   Sit to Stand 5  Supervision   Stand to Sit 5  Supervision   Toilet transfer 5  Supervision   Additional items Standard toilet   Functional Mobility   Functional Mobility 5  Supervision   Additional Comments short household distance throughout room   Balance   Static Sitting Fair +   Dynamic Sitting Fair   Static Standing Fair   Dynamic Standing Fair   Activity  Tolerance   Activity Tolerance Patient limited by fatigue   RUE Assessment   RUE Assessment WFL  (MMT 5/5 grossly)   LUE Assessment   LUE Assessment WFL  (MMT 4+/5 grossly)   Hand Function   Gross Motor Coordination Functional   Fine Motor Coordination Functional   Hand Function Comments slight delay in L UE opposition but functional   Sensation   Light Touch No apparent deficits   Vision - Complex Assessment   Acuity Able to read employee name badge without difficulty   Additional Comments pt reports new blurry vision recently but able to read a book   Cognition   Overall Cognitive Status WFL   Arousal/Participation Cooperative   Attention Within functional limits   Orientation Level Oriented X4   Following Commands Follows all commands and directions without difficulty   Assessment   Limitation Decreased ADL status;Decreased Safe judgement during ADL;Decreased endurance   Prognosis Good   Assessment Patient evaluated by Occupational Therapy.  Patient admitted with Stroke-like symptoms.  The patients occupational profile, medical and therapy history includes a extensive additional review of physical, cognitive, or psychosocial history related to current functional performance.  Comorbidities affecting functional mobility and ADLS include: asthma and hypertension.  Prior to admission, patient was independent with functional mobility without assistive device, independent with ADLS, and independent with IADLS.  The evaluation identifies the following performance deficits: weakness, impaired balance, decreased endurance, increased fall risk, new onset of impairment of functional mobility, decreased ADLS, decreased IADLS, decreased activity tolerance, decreased safety awareness, and decreased strength, that result in activity limitations and/or participation restrictions. This evaluation requires clinical decision making of moderate complexity, because the patient may present with comorbidities that affect occupational  performance and required minimal or moderate modification of tasks or assistance with the consideration of several treatment options.  The Barthel Index was used as a functional outcome tool presenting with a score of Barthel Index Score: 55, indicating marked limitations of functional mobility and ADLS.  The patient's raw score on the AM-PAC Daily Activity Inpatient Short Form is 21. A raw score of greater than or equal to 19 suggests the patient may benefit from discharge to home. Please refer to the recommendation of the Occupational Therapist for safe discharge planning.  Patient will benefit from skilled Occupational Therapy services to address above deficits and facilitate a safe return to prior level of function.   Goals   Patient Goals to go home   STG Time Frame   (1-7 days)   Short Term Goal  Goals established to promote Patient Goals: to go home:  Grooming: independent standing at sink; Bathing: independent; Lower Body Dressing: independent; Toileting: independent; Patient will increase ambulatory standard toilet transfer to independent with no assistive device to increase performance and safety with ADLS and functional mobility; Patient will increase standing tolerance to 5 minutes during ADL task to decrease assistance level and decrease fall risk; Patient will increase bed mobility to independent in preparation for ADLS and transfers; Patient will increase functional mobility to and from bathroom with no assistive device independently to increase performance with ADLS and to use a toilet; Patient will tolerate 5 minutes of UE ROM/strengthening to increase general activity tolerance and performance in ADLS/IADLS; Patient will improve functional activity tolerance to 10 minutes of sustained functional tasks to increase participation in basic self-care and decrease assistance level;  Patient will be able to to verbalize understanding and perform energy conservation/proper body mechanics during ADLS and  functional mobility at least 75% of the time with minimal cueing to decrease signs of fatigue and increase stamina to return to prior level of function; Patient will increase dynamic standing balance to fair+ to improve postural stability and decrease fall risk during standing ADLS and transfers.   LTG Time Frame   (8-14 days)   Long Term Goal Patient will increase standing tolerance to 10 minutes during ADL task to decrease assistance level and decrease fall risk;  Patient will tolerate 10 minutes of UE ROM/strengthening to increase general activity tolerance and performance in ADLS/IADLS; Patient will improve functional activity tolerance to 20 minutes of sustained functional tasks to increase participation in basic self-care and decrease assistance level;  Patient will be able to to verbalize understanding and perform energy conservation/proper body mechanics during ADLS and functional mobility at least 90% of the time without cueing to decrease signs of fatigue and increase stamina to return to prior level of function; Patient will increase dynamic standing balance to good to improve postural stability and decrease fall risk during standing ADLS and transfers.   Pt will score >/= 24/24 on AM-PAC Daily Activity Inpatient scale to promote safe independence with ADLs and functional mobility; Pt will score >/= 85/100 on Barthel Index in order to decrease caregiver assistance needed and increase ability to perform ADLs and functional mobility.   Plan   Treatment Interventions ADL retraining;Functional transfer training;UE strengthening/ROM;Endurance training;Equipment evaluation/education;Patient/family training;Activityengagement;Compensatory technique education   Goal Expiration Date 05/22/24   OT Frequency 3-5x/wk   Discharge Recommendation   Rehab Resource Intensity Level, OT III (Minimum Resource Intensity)   AM-PAC Daily Activity Inpatient   Lower Body Dressing 3   Bathing 3   Toileting 3   Upper Body Dressing  4   Grooming 4   Eating 4   Daily Activity Raw Score 21   Daily Activity Standardized Score (Calc for Raw Score >=11) 44.27   AM-PAC Applied Cognition Inpatient   Following a Speech/Presentation 4   Understanding Ordinary Conversation 4   Taking Medications 4   Remembering Where Things Are Placed or Put Away 4   Remembering List of 4-5 Errands 4   Taking Care of Complicated Tasks 4   Applied Cognition Raw Score 24   Applied Cognition Standardized Score 62.21   Barthel Index   Feeding 10   Bathing 0   Grooming Score 0   Dressing Score 5   Bladder Score 10   Bowels Score 10   Toilet Use Score 5   Transfers (Bed/Chair) Score 10   Mobility (Level Surface) Score 0   Stairs Score 5   Barthel Index Score 55   Licensure   [place] License Number  Savanna Cazaresaustin MS OTR/L [id number]

## 2024-05-08 NOTE — DISCHARGE SUMMARY
Formerly [facility]  Discharge- Cherie Arias 1975, 48 y.o. female MRN: [id number]  Unit/Bed#: [address] [place] [address] Encounter: [id number]  Primary Care Provider: Kenan Vargas   Date and time admitted to hospital: 5/6/2024  6:35 PM    * Stroke-like symptoms  Assessment & Plan  Patient presents with dizziness and left side numbness weakness with paresthesias started around 2:40 PM on the day of admission lasted for 2-3 hours.  Reports went to 4 doctors office today including PT office  CTA head and neck unremarkable  MRI of the brain-moderate scattered periventricular white matter hyperintensities within the cerebral hemispheres which was somewhat atypical for the patient of this age.  These are peripheral in location affecting the frontal and parietal lobes sparing the basal ganglia and brainstem.  Differential diagnosis include precocious chronic microangiopathic changes, subacute arteriosclerotic encephalopathy, CAD as well as sequela of chronic CNS vasculitis  2D echo with bubble study-EF 54% without any PFO with normal valvular function  Telemetry. EKG showed NSR  LDL was 76.  Hemoglobin A1c was 5  Continue aspirin 81 mg p.o. daily Lipitor 40 mg p.o. daily.  Patient to be started on verapamil  mg p.o. daily as per neurology recommendations  Neurology input appreciated  Patient ordered for ANCA, Lyme's antibody, CRP, ACE level, genetic test for notch 3 gene  Patient will need outpatient follow-up with neurology in 8 weeks      Hyperthyroidism  Assessment & Plan  Stopped taking Tapazole for about 4 months as she could not get refill.  Made appointment with Endo today for Wednesday.  Reports high eye pressure with pain for 3 days, takes timolol eyedrops.  TSH was 0.29.  Free T4 0.85, free T3 is 1.2  DisCussed with endocrinology-patient to be started on methimazole 2.5 mg p.o. daily.  TSI antibody TPO, TG antibody were ordered  Outpatient follow-up with  endocrinology    Asthma  Assessment & Plan  Possible history of asthma.  No evidence of exacerbation    Nicotine abuse  Assessment & Plan  Nicotine patch, smoking cessation    Hypertension  Assessment & Plan  Was on Norvasc and lisinopril.  Taking Norvasc intermittently due to lack of refill.  Has not been taking lisinopril for about 1 month.  Made appointment today to see a new PCP within a week.  Patient was started on verapamil per neurology recommendations.  Will discontinue Norvasc and lisinopril        Medical Problems       Resolved Problems  Date Reviewed: 5/8/2024   None       Discharging Physician / Practitioner: Alvarado Stevens MD  PCP: Kenan Vargas  Admission Date:   Admission Orders (From admission, onward)       Ordered        05/06/24 2127  Place in Observation  Once                          Discharge Date: 05/08/24    Consultations During Hospital Stay:  Neurology and endocrinology      Significant Findings / Test Results:   CTA of the head and neck without any acute intracranial abnormality, no proximal large vessel occlusion of the head and neck without any significant cervical stenosis  MRI brain-moderate scattered periventricular white matter hyperintensities within the cerebral hemispheres somewhat atypical for the patient's age.  This is primarily peripheral in location affecting the frontal and parietal lobes sparing basal ganglia and brainstem with moderate paranasal sinus disease without any air-fluid levels  2D echo showed EF of 55% with normal systolic function without any PFO       Outpatient Tests Requested:  Follow-up with endocrinology and neurology    Complications: None    Reason for Admission:     Hospital Course:   Cherie Arias is a 48 y.o. female patient with past medical history of hypertension, hypothyroidism, nicotine abuse, asthma who originally presented to the hospital on 5/6/2024 due to dizziness, left-sided numbness with weakness.  Patient also reports  "intermittent flushing sensation and some palpitations.  Patient had CTA of the head and neck which was unremarkable in the ED.  Patient admitted for stroke pathway.  MRI was negative for acute ischemia and showed chronic changes for which workup was performed.  Patient was seen by neurology.  Patient was also seen by endocrinology for hyperthyroid state and was started on low-dose methimazole.  Patient continued remained stable and be discharged home with outpatient follow-up with neurology and endocrinology      Please see above list of diagnoses and related plan for additional information.     Condition at Discharge: fair    Discharge Day Visit / Exam:   Subjective: Patient reports that she is feeling hot and has flushed sensation.  Denies any further dizziness, numbness.  Patient is anxious to go home.  Vitals: Blood Pressure: 137/88 (05/08/24 1235)  Pulse: 64 (05/08/24 1235)  Temperature: 98.1 °F (36.7 °C) (05/08/24 1235)  Temp Source: Oral (05/08/24 1235)  Respirations: 18 (05/08/24 1235)  Height: 5' 4\" (162.6 cm) (05/07/24 0831)  Weight - Scale: 61.2 kg (135 lb) (05/07/24 0831)  SpO2: 98 % (05/08/24 1235)  Exam:   Physical Exam  Constitutional:       Appearance: Normal appearance.   HENT:      Head: Normocephalic and atraumatic.      Nose: Nose normal.      Mouth/Throat:      Mouth: Mucous membranes are moist.      Pharynx: Oropharynx is clear.   Eyes:      Extraocular Movements: Extraocular movements intact.      Pupils: Pupils are equal, round, and reactive to light.   Cardiovascular:      Rate and Rhythm: Normal rate and regular rhythm.   Pulmonary:      Effort: Pulmonary effort is normal.      Breath sounds: Normal breath sounds.   Abdominal:      General: Bowel sounds are normal. There is no distension.      Palpations: Abdomen is soft.      Tenderness: There is no abdominal tenderness.   Musculoskeletal:         General: No swelling.      Cervical back: Normal range of motion and neck supple.   Skin:     " General: Skin is warm and dry.   Neurological:      General: No focal deficit present.      Mental Status: She is alert.         Discharge instructions/Information to patient and family:   See after visit summary for information provided to patient and family.      Provisions for Follow-Up Care:  See after visit summary for information related to follow-up care and any pertinent home health orders.      Mobility at time of Discharge:   Basic Mobility Inpatient Raw Score: 20  JH-HLM Goal: 6: Walk 10 steps or more  JH-HLM Achieved: 7: Walk 25 feet or more  HLM Goal achieved. Continue to encourage appropriate mobility.     Disposition:   Home    Planned Readmission: No     Discharge Statement:  I spent 35 minutes discharging the patient. This time was spent on the day of discharge. I had direct contact with the patient on the day of discharge. Greater than 50% of the total time was spent examining patient, answering all patient questions, arranging and discussing plan of care with patient as well as directly providing post-discharge instructions.  Additional time then spent on discharge activities.    Discharge Medications:  See after visit summary for reconciled discharge medications provided to patient and/or family.      **Please Note: This note may have been constructed using a voice recognition system**

## 2024-05-08 NOTE — PLAN OF CARE
Problem: PHYSICAL THERAPY ADULT  Goal: Performs mobility at highest level of function for planned discharge setting.  See evaluation for individualized goals.  Description: Treatment/Interventions: Functional transfer training, ADL retraining, LE strengthening/ROM, Therapeutic exercise, Endurance training, Bed mobility, Gait training, Spoke to nursing, OT          See flowsheet documentation for full assessment, interventions and recommendations.  Note: Prognosis: Good  Problem List: Decreased strength, Decreased endurance, Impaired balance, Decreased mobility, Decreased safety awareness, Pain  Assessment: Patient seen for Physical Therapy evaluation. Patient admitted with Stroke-like symptoms.  Comorbidities affecting patient's physical performance include: asthma and hypertension.  Personal factors affecting patient at time of initial evaluation include: lives in multi story house, ambulating with assistive device, stairs to enter home, inability to navigate level surfaces without external assistance, and inability to perform IADLS . Prior to admission, patient was independent with functional mobility without assistive device, independent with ADLS, independent with IADLS, living with spouse + family in a multi level home with 12 steps to enter, ambulating household distance, and ambulating community distances.  Please find objective findings from Physical Therapy assessment regarding body systems outlined above with impairments and limitations including weakness, impaired balance, decreased endurance, gait deviations, decreased activity tolerance, decreased functional mobility tolerance, decreased safety awareness, and fall risk.  The Barthel Index was used as a functional outcome tool presenting with a score of Barthel Index Score: 65 today indicating moderate limitations of functional mobility and ADLS.  Patient's clinical presentation is currently evolving as seen in patient's presentation of increased fall  risk, new onset of impairment of functional mobility, and decreased endurance. Pt would benefit from continued Physical Therapy treatment to address deficits as defined above and maximize level of functional mobility. As demonstrated by objective findings, the assigned level of complexity for this evaluation is moderate.The patient's AM-[facility] Basic Mobility Inpatient Short Form Raw Score is 20. A Raw score of greater than 16 suggests the patient may benefit from discharge to home. Please also refer to the recommendation of the Physical Therapist for safe discharge planning.        Rehab Resource Intensity Level, PT: III (Minimum Resource Intensity)    See flowsheet documentation for full assessment.

## 2024-05-08 NOTE — ASSESSMENT & PLAN NOTE
Stopped taking Tapazole for about 4 months as she could not get refill.  Made appointment with Endo today for Wednesday.  Reports high eye pressure with pain for 3 days, takes timolol eyedrops.  TSH was 0.29.  Free T4 0.85, free T3 is 1.2  DisCussed with endocrinology-patient to be started on methimazole 2.5 mg p.o. daily.  TSI antibody TPO, TG antibody were ordered  Outpatient follow-up with endocrinology

## 2024-05-08 NOTE — ASSESSMENT & PLAN NOTE
Patient presents with dizziness and left side numbness weakness with paresthesias started around 2:40 PM on the day of admission lasted for 2-3 hours.  Reports went to 4 doctors office today including PT office  CTA head and neck unremarkable  MRI of the brain-moderate scattered periventricular white matter hyperintensities within the cerebral hemispheres which was somewhat atypical for the patient of this age.  These are peripheral in location affecting the frontal and parietal lobes sparing the basal ganglia and brainstem.  Differential diagnosis include precocious chronic microangiopathic changes, subacute arteriosclerotic encephalopathy, CAD as well as sequela of chronic CNS vasculitis  2D echo with bubble study-EF 54% without any PFO with normal valvular function  Telemetry. EKG showed NSR  LDL was 76.  Hemoglobin A1c was 5  Continue aspirin 81 mg p.o. daily Lipitor 40 mg p.o. daily.  Patient to be started on verapamil  mg p.o. daily as per neurology recommendations  Neurology input appreciated  Patient ordered for ANCA, Lyme's antibody, CRP, ACE level, genetic test for notch 3 gene  Patient will need outpatient follow-up with neurology in 8 weeks

## 2024-05-08 NOTE — PLAN OF CARE
Problem: PAIN - ADULT  Goal: Verbalizes/displays adequate comfort level or baseline comfort level  Description: Interventions:  - Encourage patient to monitor pain and request assistance  - Assess pain using appropriate pain scale  - Administer analgesics based on type and severity of pain and evaluate response  - Implement non-pharmacological measures as appropriate and evaluate response  - Consider cultural and social influences on pain and pain management  - Notify physician/advanced practitioner if interventions unsuccessful or patient reports new pain  Outcome: Progressing     Problem: INFECTION - ADULT  Goal: Absence or prevention of progression during hospitalization  Description: INTERVENTIONS:  - Assess and monitor for signs and symptoms of infection  - Monitor lab/diagnostic results  - Monitor all insertion sites, i.e. indwelling lines, tubes, and drains  - Monitor endotracheal if appropriate and nasal secretions for changes in amount and color  - [place] appropriate cooling/warming therapies per order  - Administer medications as ordered  - Instruct and encourage patient and family to use good hand hygiene technique  - Identify and instruct in appropriate isolation precautions for identified infection/condition  Outcome: Progressing     Problem: SAFETY ADULT  Goal: Patient will remain free of falls  Description: INTERVENTIONS:  - Educate patient/family on patient safety including physical limitations  - Instruct patient to call for assistance with activity   - Consult OT/PT to assist with strengthening/mobility   - Keep Call bell within reach  - Keep bed low and locked with side rails adjusted as appropriate  - Keep care items and personal belongings within reach  - Initiate and maintain comfort rounds  - Make Fall Risk Sign visible to staff  - Offer Toileting every 2 Hours, in advance of need  - Initiate/Maintain bed/chair alarm  - Obtain necessary fall risk management equipment:  bed/chair alarm  -  Apply yellow socks and bracelet for high fall risk patients  - Consider moving patient to room near nurses station  Outcome: Progressing  Goal: Maintain or return to baseline ADL function  Description: INTERVENTIONS:  -  Assess patient's ability to carry out ADLs; assess patient's baseline for ADL function and identify physical deficits which impact ability to perform ADLs (bathing, care of mouth/teeth, toileting, grooming, dressing, etc.)  - Assess/evaluate cause of self-care deficits   - Assess range of motion  - Assess patient's mobility; develop plan if impaired  - Assess patient's need for assistive devices and provide as appropriate  - Encourage maximum independence but intervene and supervise when necessary  - Involve family in performance of ADLs  - Assess for home care needs following discharge   - Consider OT consult to assist with ADL evaluation and planning for discharge  - Provide patient education as appropriate  Outcome: Progressing  Goal: Maintains/Returns to pre admission functional level  Description: INTERVENTIONS:  - Perform AM-PAC 6 Click Basic Mobility/ Daily Activity assessment daily.  - Set and communicate daily mobility goal to care team and patient/family/caregiver.   - Collaborate with rehabilitation services on mobility goals if consulted  - Perform Range of Motion 3 times a day.  - Reposition patient every 3 hours.  - Dangle patient 3 times a day  - Stand patient 3 times a day  - Ambulate patient 3 times a day  - Out of bed to chair 3 times a day   - Out of bed for meals 3 times a day  - Out of bed for toileting  - Record patient progress and toleration of activity level   Outcome: Progressing     Problem: DISCHARGE PLANNING  Goal: Discharge to home or other facility with appropriate resources  Description: INTERVENTIONS:  - Identify barriers to discharge w/patient and caregiver  - Arrange for needed discharge resources and transportation as appropriate  - Identify discharge learning  needs (meds, wound care, etc.)  - Arrange for interpretive services to assist at discharge as needed  - Refer to Case Management Department for coordinating discharge planning if the patient needs post-hospital services based on physician/advanced practitioner order or complex needs related to functional status, cognitive ability, or social support system  Outcome: Progressing     Problem: Knowledge Deficit  Goal: Patient/family/caregiver demonstrates understanding of disease process, treatment plan, medications, and discharge instructions  Description: Complete learning assessment and assess knowledge base.  Interventions:  - Provide teaching at level of understanding  - Provide teaching via preferred learning methods  Outcome: Progressing     Problem: NEUROSENSORY - ADULT  Goal: Achieves stable or improved neurological status  Description: INTERVENTIONS  - Monitor and report changes in neurological status  - Monitor vital signs such as temperature, blood pressure, glucose, and any other labs ordered   - Initiate measures to prevent increased intracranial pressure  - Monitor for seizure activity and implement precautions if appropriate      Outcome: Progressing  Goal: Achieves maximal functionality and self care  Description: INTERVENTIONS  - Monitor swallowing and airway patency with patient fatigue and changes in neurological status  - Encourage and assist patient to increase activity and self care.   - Encourage visually impaired, hearing impaired and aphasic patients to use assistive/communication devices  Outcome: Progressing

## 2024-05-08 NOTE — SPEECH THERAPY NOTE
Consult received.  Records reviewed.  Pt admitted c symptoms concerning for CVA/TIA.  Pt passed RN Dysphagia Assessment and she denies s/s dysphagia with food and liquid. No oral motor weakness, dysarthria or dysphonia noted in conversation.  MRI results noted.  No additional inpatient Speech Pathology evaluation appears indicated at this time.  Please re-consult if additional concerns arise. Thank you.  Amanda Hines MS CCC-SLP  [place] License [id number]

## 2024-05-09 ENCOUNTER — OFFICE VISIT (OUTPATIENT)
Dept: OBGYN CLINIC | Facility: CLINIC | Age: 49
End: 2024-05-09
Payer: COMMERCIAL

## 2024-05-09 VITALS
HEIGHT: 64 IN | WEIGHT: 134 LBS | SYSTOLIC BLOOD PRESSURE: 138 MMHG | DIASTOLIC BLOOD PRESSURE: 101 MMHG | BODY MASS INDEX: 22.88 KG/M2 | HEART RATE: 73 BPM

## 2024-05-09 DIAGNOSIS — M70.62 GREATER TROCHANTERIC BURSITIS OF LEFT HIP: Primary | ICD-10-CM

## 2024-05-09 DIAGNOSIS — M25.552 LEFT HIP PAIN: ICD-10-CM

## 2024-05-09 LAB
ACE SERPL-CCNC: 23 U/L (ref 14–82)
B BURGDOR IGG+IGM SER QL IA: NEGATIVE
THYROGLOB AB SERPL-ACNC: <1 IU/ML (ref 0–0.9)
THYROPEROXIDASE AB SERPL-ACNC: <9 IU/ML (ref 0–34)

## 2024-05-09 PROCEDURE — 99213 OFFICE O/P EST LOW 20 MIN: CPT | Performed by: ORTHOPAEDIC SURGERY

## 2024-05-09 NOTE — PROGRESS NOTES
Assessment/Plan:  1. Greater trochanteric bursitis of left hip  Ambulatory referral to Spine & Pain Management    CANCELED: Ambulatory referral to Spine & Pain Management      2. Left hip pain  Ambulatory referral to Spine & Pain Management    CANCELED: Ambulatory referral to Spine & Pain Management        Scribe Attestation      I,:  Angie Sky am acting as a scribe while in the presence of the attending physician.:       I,:  Melquiades Slade, DO personally performed the services described in this documentation    as scribed in my presence.:           Cherie is a pleasant 48 y.o. female with chronic left hip pain secondary to greater trochanteric bursitis. I recommend she continue with home exercise program at this time. Patient was recently admitted due to stroke like symptoms, we discussed following up regarding this before moving forward with additional treatments for her hip. Patient was agreeable to this. A referral to spine and pain was placed to address her greater trochanteric pain and possible guided injection if appropriate. This appointment will be scheduled for August after her vascular and neurology follow ups. She may follow up in my clinic on an as needed basis.     Subjective: Follow up left hip pain    Patient ID: Cherie Arias is a 48 y.o. female presents for follow-up evaluation of left hip pain due to greater trochanteric bursitis.  Her last visit patient was referred to physical therapy and instructed to use Voltaren topically.  Patient states that on days she attended physical therapy it was easier to ambulate without pain however she only attended 4 sessions. States she has been compliant with home exercise program. States the Voltaren does not provide pain relief.  She continues to have a constant aching pain localized to the greater trochanter the left hip.  Denies any acute injury or trauma.    History of note patient was recently hospitalized for dizziness and strokelike  symptoms, 5/6/2024.    Review of Systems   Constitutional:  Negative for chills and fever.   HENT:  Negative for ear pain and sore throat.    Eyes:  Negative for pain and visual disturbance.   Respiratory:  Negative for cough and shortness of breath.    Cardiovascular:  Negative for chest pain and palpitations.   Gastrointestinal:  Negative for abdominal pain and vomiting.   Genitourinary:  Negative for dysuria and hematuria.   Musculoskeletal:  Negative for arthralgias and back pain.   Skin:  Negative for color change and rash.   Neurological:  Negative for seizures and syncope.   All other systems reviewed and are negative.        Past Medical History:   Diagnosis Date    Asthma     Disease of thyroid gland     Hypertension        Past Surgical History:   Procedure Laterality Date    TUBAL LIGATION         History reviewed. No pertinent family history.    Social History     Occupational History    Not on file   Tobacco Use    Smoking status: Every Day     Current packs/day: 0.50     Types: Cigarettes    Smokeless tobacco: Never   Vaping Use    Vaping status: Never Used   Substance and Sexual Activity    Alcohol use: Not Currently    Drug use: Yes     Types: Marijuana    Sexual activity: Not on file         Current Outpatient Medications:     albuterol (PROVENTIL HFA,VENTOLIN HFA) 90 mcg/act inhaler, Inhale 2 puffs every 6 (six) hours as needed for wheezing, Disp: , Rfl:     aspirin 81 mg chewable tablet, Chew 1 tablet (81 mg total) daily, Disp: 30 tablet, Rfl: 0    atorvastatin (LIPITOR) 40 mg tablet, Take 1 tablet (40 mg total) by mouth daily with dinner, Disp: 30 tablet, Rfl: 0    fluocinolone acetonide (DermOtic) 0.01 % otic oil, Administer 5 drops into both ears 2 (two) times a day, Disp: 20 mL, Rfl: 0    methimazole (TAPAZOLE) 5 mg tablet, Take 0.5 tablets (2.5 mg total) by mouth in the morning, Disp: 30 tablet, Rfl: 1    nicotine (NICODERM CQ) 14 mg/24hr TD 24 hr patch, Place 1 patch on the skin over 24  hours daily, Disp: 28 patch, Rfl: 0    timolol (TIMOPTIC) 0.5 % ophthalmic solution, INSTILL 1 DROP INTO BOTH EYES IN THE MORNING, Disp: , Rfl:     verapamil (CALAN-SR) 120 mg CR tablet, Take 1 tablet (120 mg total) by mouth daily at bedtime, Disp: 30 tablet, Rfl: 0  No current facility-administered medications for this visit.    Allergies   Allergen Reactions    Shellfish-Derived Products - Food Allergy Hives       Objective:  Vitals:    05/09/24 1335   BP: (!) 138/101   Pulse: 73       Body mass index is 23 kg/m².    Left Hip Exam     Tenderness   The patient is experiencing tenderness in the greater trochanter.    Range of Motion   Abduction:  50   Adduction:  30   Flexion:  120   External rotation:  50   Internal rotation: 30     Muscle Strength   Flexion: 5/5     Other   Erythema: absent  Sensation: normal  Pulse: present    Comments:    (-) ELIZABETH, (-) FADIR  (-) Log roll  2+ TP and DP pulses with brisk capillary refill to the toes  Sural, saphenous, tibial, superficial and deep peroneal motor and sensory distribution intact  Sensation to light touch                 Physical Exam  Vitals and nursing note reviewed.   Constitutional:       Appearance: Normal appearance.   HENT:      Head: Normocephalic.      Right Ear: External ear normal.      Left Ear: External ear normal.   Eyes:      Extraocular Movements: Extraocular movements intact.      Conjunctiva/sclera: Conjunctivae normal.   Cardiovascular:      Rate and Rhythm: Normal rate.      Pulses: Normal pulses.   Pulmonary:      Effort: Pulmonary effort is normal.      Breath sounds: Normal breath sounds.   Abdominal:      General: Abdomen is flat.   Musculoskeletal:         General: Normal range of motion.      Cervical back: Normal range of motion and neck supple.      Comments: See ortho exam   Skin:     General: Skin is warm and dry.   Neurological:      General: No focal deficit present.      Mental Status: She is alert.   Psychiatric:         Mood and  Affect: Mood normal.         Behavior: Behavior normal.         I have personally reviewed pertinent films in PACS.      This document was created using speech voice recognition software.   Grammatical errors, random word insertions, pronoun errors, and incomplete sentences are an occasional consequence of this system due to software limitations, ambient noise, and hardware issues.   Any formal questions or concerns about content, text, or information contained within the body of this dictation should be directly addressed to the provider for clarification.

## 2024-05-10 LAB
C-ANCA TITR SER IF: NORMAL TITER
MYELOPEROXIDASE AB SER IA-ACNC: <0.2 UNITS (ref 0–0.9)
P-ANCA ATYPICAL TITR SER IF: NORMAL TITER
P-ANCA TITR SER IF: NORMAL TITER
PROTEINASE3 AB SER IA-ACNC: <0.2 UNITS (ref 0–0.9)

## 2024-05-11 LAB — TSI SER-ACNC: <0.1 IU/L (ref 0–0.55)

## 2024-05-16 NOTE — PROGRESS NOTES
Assessment/Plan:     Small varicose veins in the L calf  Venous insufficiency  -Painful veins L > R which swell with very tired, achy legs  -LLE calf/ankle discoloration which may be due to venous insufficiency    Plan: We discussed the pathophysiology and treatment of venous disease and the importance of compressive measures, including the roll of medial compression stockings for treatment of painful varicose veins and to slow progression of venous disease. Script for graduated, medical stockings.  Due to her history and stated severity of symptoms, we will check a venous reflux study prior to her follow-up visit.    Order for prescription graded compression stockings of 20-30 mm Hg  Compression, periodic elevation, low-sodium diet and regular exercise   Apply good moisturizer to legs daily   Patient education for venous insufficiency.  Follow up in 3-4 months for re-check with reflux study       Diagnoses and all orders for this visit:    Varicose veins of bilateral lower extremities with pain    Varicose veins of unspecified lower extremity with pain  -     Compression Stocking  -     Ambulatory Referral to Vascular Surgery  -     VAS Lower extremity venous insufficiency duplex, bilateral w/ measurements; Future    Nicotine abuse        \  Subjective:     Patient ID: Cherie Arias is a 48 y.o. female.  New patient, presents today for evaluation of VV. Patient reports LLE V with pain. Heaviness, tiredness and swelling. Not wearing compression and no previous h/o vein treatment.     HPI   Cherie Arias is a 48 y.o. female who presents for evaluation of bilateral lower extremity varicose veins with pain worse on the left.    Patient states that she started to have problems with varicosities at age 15 when she was pregnant and had a varicose vein with pain in the groin.  After that, she developed lower extremity varicosities which have been slowly worsening over the years.  In the past couple of years she  started to develop pain, swelling and tenderness to the left calf varicose veins with the severe aching, heaviness, abnormal sensations in the legs.  The calf veins throb and bulge.  Intermittently, she has had medial left thigh pain from the level just above the knee up into the groin with aching.  There is no visible, bulging veins in that area. She asks about staining to the calf and the lateral L ankle that started very small about 2 years ago and has significantly increased in size which may be evidence of venous insufficiency.     The feet feel worse when she is walking or standing for prolonged periods of time and a bit improved in the morning.  She has not trialed compression.  She currently has no abdominal or groin pain.  She complains of lower extremity edema but none is appreciated on exam today.  Varicosities to the left calf are up to 5 to 8 mm.     She has no history of DVT.  Her daughter also has varicose veins.    She comes in hopes of treatment for painful veins and to correct the staining to the left leg.        -VV since pregnant with dtr at age 15  -Legs aching, prickly; abn sensation  -Veins are huge and swelling  -No DVT   -smoking      MRI brain 5/8/24:  IMPRESSION:     Moderate scattered periventricular white matter hyperintensities within the cerebral hemispheres, somewhat atypical for a patient of this age. These are primarily peripheral in location affecting the frontal and parietal lobes sparing the basal ganglia   and brainstem. The findings are nonspecific and differential considerations would include precocious chronic microangiopathic change, subacute arteriosclerotic encephalopathy, CADASIL or sequela of chronic CNS vasculitis. Chronic infectious/inflammatory   process including demyelinating disease, considered less likely given the pattern of distribution.     Moderate paranasal sinus disease with no air-fluid levels.        Review of Systems   Constitutional: Negative.    HENT:  Negative.     Eyes: Negative.    Respiratory: Negative.     Cardiovascular: Negative.    Gastrointestinal: Negative.    Endocrine: Negative.    Genitourinary: Negative.    Musculoskeletal: Negative.    Skin: Negative.    Allergic/Immunologic: Negative.    Neurological: Negative.    Hematological: Negative.    Psychiatric/Behavioral: Negative.           Objective:        L posterior calf      L lateral staining     Physical Exam  Vitals and nursing note reviewed.   Constitutional:       Appearance: She is well-developed.   HENT:      Head: Normocephalic and atraumatic.   Eyes:      Extraocular Movements: EOM normal.      Pupils: Pupils are equal, round, and reactive to light.   Neck:      Vascular: No JVD.   Cardiovascular:      Rate and Rhythm: Normal rate and regular rhythm.      Pulses:           Radial pulses are 2+ on the right side and 2+ on the left side.        Dorsalis pedis pulses are 2+ on the right side and 2+ on the left side.      Heart sounds: Normal heart sounds, S1 normal and S2 normal. No murmur heard.     No friction rub. No gallop.   Pulmonary:      Effort: Pulmonary effort is normal. No accessory muscle usage or respiratory distress.      Breath sounds: Normal breath sounds. No wheezing or rales.   Abdominal:      General: Bowel sounds are normal. There is no distension.      Palpations: Abdomen is soft.      Tenderness: There is no abdominal tenderness.   Musculoskeletal:         General: No deformity or edema. Normal range of motion.      Right lower leg: No edema.      Left lower leg: No edema.   Skin:     General: Skin is warm and dry.      Findings: No lesion or rash.      Nails: There is no clubbing.   Neurological:      Mental Status: She is alert and oriented to person, place, and time.      Comments: Grossly normal    Psychiatric:         Mood and Affect: Mood and affect normal.         Behavior: Behavior is cooperative.             I have reviewed and made appropriate changes to the  "review of systems input by the medical assistant.    Vitals:    05/17/24 1419   BP: 110/70   BP Location: Right arm   Patient Position: Sitting   Pulse: 78   Weight: 60.3 kg (133 lb)   Height: 5' 5\" (1.651 m)       Patient Active Problem List   Diagnosis    Stroke-like symptoms    Hypertension    Hyperthyroidism    Nicotine abuse    Asthma       Past Surgical History:   Procedure Laterality Date    TUBAL LIGATION         History reviewed. No pertinent family history.    Social History     Socioeconomic History    Marital status: /Civil Union     Spouse name: Not on file    Number of children: Not on file    Years of education: Not on file    Highest education level: Not on file   Occupational History    Not on file   Tobacco Use    Smoking status: Every Day     Current packs/day: 0.50     Types: Cigarettes    Smokeless tobacco: Never   Vaping Use    Vaping status: Never Used   Substance and Sexual Activity    Alcohol use: Not Currently     Comment: Every now and then    Drug use: Not Currently     Types: Marijuana    Sexual activity: Not on file   Other Topics Concern    Not on file   Social History Narrative    Not on file     Social Determinants of Health     Financial Resource Strain: Low Risk  (5/17/2024)    Overall Financial Resource Strain (CARDIA)     Difficulty of Paying Living Expenses: Not very hard   Food Insecurity: No Food Insecurity (5/7/2024)    Hunger Vital Sign     Worried About Running Out of Food in the Last Year: Never true     Ran Out of Food in the Last Year: Never true   Transportation Needs: No Transportation Needs (5/7/2024)    PRAPARE - Transportation     Lack of Transportation (Medical): No     Lack of Transportation (Non-Medical): No   Physical Activity: Not on file   Stress: Not on file   Social Connections: Not on file   Intimate Partner Violence: Not on file   Housing Stability: Low Risk  (5/7/2024)    Housing Stability Vital Sign     Unable to Pay for Housing in the Last Year: " No     Number of Places Lived in the Last Year: 1     Unstable Housing in the Last Year: No       Allergies   Allergen Reactions    Shellfish-Derived Products - Food Allergy Hives         Current Outpatient Medications:     albuterol (PROVENTIL HFA,VENTOLIN HFA) 90 mcg/act inhaler, Inhale 2 puffs every 6 (six) hours as needed for wheezing, Disp: , Rfl:     aspirin 81 mg chewable tablet, Chew 1 tablet (81 mg total) daily, Disp: 30 tablet, Rfl: 0    atorvastatin (LIPITOR) 40 mg tablet, Take 1 tablet (40 mg total) by mouth daily with dinner, Disp: 30 tablet, Rfl: 0    fluocinolone acetonide (DermOtic) 0.01 % otic oil, Administer 5 drops into both ears 2 (two) times a day, Disp: 20 mL, Rfl: 0    methimazole (TAPAZOLE) 5 mg tablet, Take 0.5 tablets (2.5 mg total) by mouth in the morning, Disp: 30 tablet, Rfl: 1    nicotine polacrilex (NICORETTE) 2 mg gum, Chew 1 each (2 mg total) as needed for smoking cessation, Disp: 100 each, Rfl: 0    timolol (TIMOPTIC) 0.5 % ophthalmic solution, Apply 1 drop to eye daily, Disp: 10 mL, Rfl: 0    verapamil (CALAN-SR) 120 mg CR tablet, Take 1 tablet (120 mg total) by mouth daily at bedtime, Disp: 30 tablet, Rfl: 0

## 2024-05-17 ENCOUNTER — CONSULT (OUTPATIENT)
Dept: VASCULAR SURGERY | Facility: CLINIC | Age: 49
End: 2024-05-17
Payer: COMMERCIAL

## 2024-05-17 ENCOUNTER — TELEPHONE (OUTPATIENT)
Dept: OTOLARYNGOLOGY | Facility: CLINIC | Age: 49
End: 2024-05-17

## 2024-05-17 ENCOUNTER — OFFICE VISIT (OUTPATIENT)
Age: 49
End: 2024-05-17

## 2024-05-17 VITALS
SYSTOLIC BLOOD PRESSURE: 110 MMHG | DIASTOLIC BLOOD PRESSURE: 70 MMHG | WEIGHT: 133 LBS | HEART RATE: 78 BPM | HEIGHT: 65 IN | BODY MASS INDEX: 22.16 KG/M2

## 2024-05-17 VITALS
DIASTOLIC BLOOD PRESSURE: 84 MMHG | OXYGEN SATURATION: 100 % | WEIGHT: 132 LBS | BODY MASS INDEX: 21.99 KG/M2 | HEIGHT: 65 IN | TEMPERATURE: 97.8 F | HEART RATE: 90 BPM | SYSTOLIC BLOOD PRESSURE: 131 MMHG

## 2024-05-17 DIAGNOSIS — N95.1 PERIMENOPAUSE: ICD-10-CM

## 2024-05-17 DIAGNOSIS — I83.819 VARICOSE VEINS OF UNSPECIFIED LOWER EXTREMITY WITH PAIN: ICD-10-CM

## 2024-05-17 DIAGNOSIS — J45.20 MILD INTERMITTENT ASTHMA, UNSPECIFIED WHETHER COMPLICATED: ICD-10-CM

## 2024-05-17 DIAGNOSIS — Z11.59 NEED FOR HEPATITIS C SCREENING TEST: ICD-10-CM

## 2024-05-17 DIAGNOSIS — E05.90 HYPERTHYROIDISM: ICD-10-CM

## 2024-05-17 DIAGNOSIS — I10 HYPERTENSION, UNSPECIFIED TYPE: ICD-10-CM

## 2024-05-17 DIAGNOSIS — Z72.0 NICOTINE ABUSE: ICD-10-CM

## 2024-05-17 DIAGNOSIS — Z11.4 SCREENING FOR HIV (HUMAN IMMUNODEFICIENCY VIRUS): ICD-10-CM

## 2024-05-17 DIAGNOSIS — I83.813 VARICOSE VEINS OF BILATERAL LOWER EXTREMITIES WITH PAIN: Primary | ICD-10-CM

## 2024-05-17 DIAGNOSIS — Z76.89 ENCOUNTER TO ESTABLISH CARE: Primary | ICD-10-CM

## 2024-05-17 DIAGNOSIS — R29.90 STROKE-LIKE SYMPTOMS: ICD-10-CM

## 2024-05-17 DIAGNOSIS — Z13.21 ENCOUNTER FOR VITAMIN DEFICIENCY SCREENING: ICD-10-CM

## 2024-05-17 DIAGNOSIS — L29.9 ITCHING OF EAR: ICD-10-CM

## 2024-05-17 PROCEDURE — 99203 OFFICE O/P NEW LOW 30 MIN: CPT | Performed by: FAMILY MEDICINE

## 2024-05-17 PROCEDURE — 99204 OFFICE O/P NEW MOD 45 MIN: CPT | Performed by: PHYSICIAN ASSISTANT

## 2024-05-17 RX ORDER — TIMOLOL MALEATE 5 MG/ML
1 SOLUTION/ DROPS OPHTHALMIC DAILY
Qty: 10 ML | Refills: 0 | Status: SHIPPED | OUTPATIENT
Start: 2024-05-17

## 2024-05-17 RX ORDER — FLUOCINOLONE ACETONIDE 0.11 MG/ML
5 OIL AURICULAR (OTIC) 2 TIMES DAILY
Qty: 20 ML | Refills: 0 | Status: SHIPPED | OUTPATIENT
Start: 2024-05-17

## 2024-05-17 RX ORDER — ASPIRIN 81 MG/1
81 TABLET, CHEWABLE ORAL DAILY
Qty: 30 TABLET | Refills: 0 | Status: SHIPPED | OUTPATIENT
Start: 2024-05-17

## 2024-05-17 RX ORDER — ATORVASTATIN CALCIUM 40 MG/1
40 TABLET, FILM COATED ORAL
Qty: 30 TABLET | Refills: 0 | Status: SHIPPED | OUTPATIENT
Start: 2024-05-17

## 2024-05-17 RX ORDER — METHIMAZOLE 5 MG/1
2.5 TABLET ORAL DAILY
Qty: 30 TABLET | Refills: 1 | Status: SHIPPED | OUTPATIENT
Start: 2024-05-17

## 2024-05-17 NOTE — PROGRESS NOTES
Ambulatory Visit  Name: Cherie Arias      : 1975      MRN: [id number]  Encounter Provider: Giovanna Burkett DO  Encounter Date: 2024   Encounter department: [facility]    Assessment & Plan   1. Encounter to establish care  2. Hyperthyroidism  Assessment & Plan:  Chronic, requires follow-up with endocrinology.  Previously treated with methimazole 2.5 mg, had stopped prior to hospitalization, had not followed up with endocrinology    TSH was 0.29.  Free T4 0.85, free T3 1.2, TSI WNL, TPO WNL    Plan:  -1 month refill ordered of methimazole  -Referral to endocrinology ordered  Orders:  -     Ambulatory Referral to Endocrinology; Future  -     methimazole (TAPAZOLE) 5 mg tablet; Take 0.5 tablets (2.5 mg total) by mouth in the morning  -     timolol (TIMOPTIC) 0.5 % ophthalmic solution; Apply 1 drop to eye daily  3. Hypertension, unspecified type  Assessment & Plan:  Chronic, history of hypertension.  Medications currently include verapamil per neurology.  Well-controlled at this visit    Plan:  -Continue verapamil 120 mg  -Advised patient to measure regularly and bring log to next appointment  4. Mild intermittent asthma, unspecified whether complicated  Assessment & Plan:  Chronic, stable continue albuterol as needed  5. Nicotine abuse  Assessment & Plan:  Reporting that she is not tolerating the nicotine patch secondary to headaches, switch to nicotine gum  Orders:  -     nicotine polacrilex (NICORETTE) 2 mg gum; Chew 1 each (2 mg total) as needed for smoking cessation  6. Stroke-like symptoms  Assessment & Plan:  Started as dizziness, left-sided numbness/weakness with paresthesia lasting 2 to 3 days during admission.  Noting improvement of symptoms    MRI with moderate scattered scattered periventricular white hyperintensities within bilateral cerebral hemispheres.     Echo: EF 54%, negative for PFO    Lyme antibody WNL  Angiotensin-converting enzyme WNL  CRP  WNL  C ANCA WNL, atypical p-ANCA WNL, MPO WNL NM-3 WNL P ANCA WNL, ESR WNL    Pending notch 3 testing    Plan:  -Continue hypertension control   -Follow up with neurology  -Continue ASA 81 mg   -Continue statin  Orders:  -     Ambulatory Referral to Neurology; Future  -     aspirin 81 mg chewable tablet; Chew 1 tablet (81 mg total) daily  -     atorvastatin (LIPITOR) 40 mg tablet; Take 1 tablet (40 mg total) by mouth daily with dinner  -     verapamil (CALAN-SR) 120 mg CR tablet; Take 1 tablet (120 mg total) by mouth daily at bedtime  7. Encounter for vitamin deficiency screening  -     Vitamin D 25 hydroxy; Future  -     Vitamin D 25 hydroxy  8. Itching of ear  -     fluocinolone acetonide (DermOtic) 0.01 % otic oil; Administer 5 drops into both ears 2 (two) times a day  9. Need for hepatitis C screening test  10. Screening for HIV (human immunodeficiency virus)  11. Perimenopause  Assessment & Plan:  Noting history of extended period between mensuration. Stating her LMP was 2/2024, one prior was 1.5 years. Noting hot flashes, difficulty sleeping.     Plan:  -Consider SNRI/SSRI, patient considering  -Advised scheduling gyn exam, patient agreeable         History of Present Illness     HPI    Here to establish care, patient is aware of her complex care requirements.  Recently hospitalized secondary to strokelike symptoms from 5/6 to 5/8, noting at that time MRI with moderate scattered scattered periventricular white hyperintensities within bilateral cerebral hemispheres.  Patient has a history of hyperthyroidism which was untreated.  Additionally the patient is noting that she has some issues with her mood and feels like she is struggling with menopause/perimenopause.  Of note last menstrual period was February, 1 prior to that was 1.5 years ago.  History of tubal ligation 1.5 years ago.  Additionally noting that she wants to quit smoking.     Noting that she is aware she needs to follow-up with multiple specialists  "including neurology and endocrine.  Stating that she does not have enough medications, to get her to her specialist appointments.  Noting that she actually needs referrals to get to see these specialists.    Review of Systems   Constitutional:  Negative for fatigue and fever.   HENT:  Negative for congestion, postnasal drip, rhinorrhea, sneezing, sore throat and tinnitus.    Respiratory:  Negative for cough, shortness of breath and wheezing.    Cardiovascular:  Negative for chest pain and palpitations.   Gastrointestinal:  Negative for abdominal distention, abdominal pain, constipation, diarrhea, nausea and vomiting.   Musculoskeletal:  Negative for arthralgias, back pain, joint swelling, myalgias and neck stiffness.   Skin:  Negative for rash and wound.   Neurological:  Negative for weakness and numbness.     Past Medical History:   Diagnosis Date    Asthma     Disease of thyroid gland     Hypertension      Past Surgical History:   Procedure Laterality Date    TUBAL LIGATION       History reviewed. No pertinent family history.  Social History     Tobacco Use    Smoking status: Every Day     Current packs/day: 0.50     Types: Cigarettes    Smokeless tobacco: Never   Vaping Use    Vaping status: Never Used   Substance and Sexual Activity    Alcohol use: Not Currently     Comment: Every now and then    Drug use: Not Currently     Types: Marijuana    Sexual activity: Not on file     Current Outpatient Medications on File Prior to Visit   Medication Sig    albuterol (PROVENTIL HFA,VENTOLIN HFA) 90 mcg/act inhaler Inhale 2 puffs every 6 (six) hours as needed for wheezing     Allergies   Allergen Reactions    Shellfish-Derived Products - Food Allergy Hives       There is no immunization history on file for this patient.  Objective     /84 (BP Location: Left arm, Patient Position: Sitting, Cuff Size: Standard)   Pulse 90   Temp 97.8 °F (36.6 °C) (Tympanic)   Ht 5' 5\" (1.651 m)   Wt 59.9 kg (132 lb)   SpO2 100%  "  BMI 21.97 kg/m²     Physical Exam  Constitutional:       Appearance: Normal appearance.   HENT:      Head: Normocephalic and atraumatic.      Nose: Nose normal.      Mouth/Throat:      Mouth: Mucous membranes are moist.      Pharynx: Oropharynx is clear.   Eyes:      Extraocular Movements: Extraocular movements intact.      Pupils: Pupils are equal, round, and reactive to light.   Cardiovascular:      Rate and Rhythm: Normal rate and regular rhythm.   Pulmonary:      Effort: Pulmonary effort is normal.      Breath sounds: Normal breath sounds.   Abdominal:      General: Bowel sounds are normal. There is no distension.      Palpations: Abdomen is soft.      Tenderness: There is no abdominal tenderness.   Musculoskeletal:         General: No swelling.      Cervical back: Normal range of motion and neck supple.   Skin:     General: Skin is warm and dry.   Neurological:      General: No focal deficit present.      Mental Status: She is alert.       Administrative Statements

## 2024-05-17 NOTE — PATIENT INSTRUCTIONS
Varicose veins    Order for prescription graded compression stockings of 20-30 mm Hg  Wear stocking EVERY day to help control swelling in legs and remove at night  Elevate legs while at rest  Continue healthy life-style changes; heart-healthy, low sodium diet; regular exercise  Patient education for venous insufficiency.  Follow up in 3 -4 months after reflux study

## 2024-05-17 NOTE — TELEPHONE ENCOUNTER
Kurtg to reach [facility] and we are unable to LM.  Called her daughter Zuly and LM that her mom needs a follow-up appt to discuss her MRI results.  Requested that someone call and schedule that f/u appt.

## 2024-05-19 PROBLEM — N95.1 PERIMENOPAUSE: Status: ACTIVE | Noted: 2024-05-19

## 2024-05-19 NOTE — ASSESSMENT & PLAN NOTE
Chronic, history of hypertension.  Medications currently include verapamil per neurology.  Well-controlled at this visit    Plan:  -Continue verapamil 120 mg  -Advised patient to measure regularly and bring log to next appointment

## 2024-05-19 NOTE — ASSESSMENT & PLAN NOTE
Chronic, requires follow-up with endocrinology.  Previously treated with methimazole 2.5 mg, had stopped prior to hospitalization, had not followed up with endocrinology    TSH was 0.29.  Free T4 0.85, free T3 1.2, TSI WNL, TPO WNL    Plan:  -1 month refill ordered of methimazole  -Referral to endocrinology ordered

## 2024-05-19 NOTE — ASSESSMENT & PLAN NOTE
Noting history of extended period between mensuration. Stating her LMP was 2/2024, one prior was 1.5 years. Noting hot flashes, difficulty sleeping.     Plan:  -Consider SNRI/SSRI, patient considering  -Advised scheduling gyn exam, patient agreeable

## 2024-05-19 NOTE — ASSESSMENT & PLAN NOTE
Reporting that she is not tolerating the nicotine patch secondary to headaches, switch to nicotine gum

## 2024-05-19 NOTE — ASSESSMENT & PLAN NOTE
Started as dizziness, left-sided numbness/weakness with paresthesia lasting 2 to 3 days during admission.  Noting improvement of symptoms    MRI with moderate scattered scattered periventricular white hyperintensities within bilateral cerebral hemispheres.     Echo: EF 54%, negative for PFO    Lyme antibody WNL  Angiotensin-converting enzyme WNL  CRP WNL  C ANCA WNL, atypical p-ANCA WNL, MPO WNL ND-3 WNL P ANCA WNL, ESR WNL    Pending notch 3 testing    Plan:  -Continue hypertension control   -Follow up with neurology  -Continue ASA 81 mg   -Continue statin

## 2024-05-20 ENCOUNTER — TELEPHONE (OUTPATIENT)
Age: 49
End: 2024-05-20

## 2024-05-20 NOTE — TELEPHONE ENCOUNTER
Patient called to schedule follow-up after MRI. Made a warm transfer of the call to Sumaya, practice admin at [facility].

## 2024-05-29 LAB — MISCELLANEOUS LAB TEST RESULT: NORMAL

## 2024-06-03 ENCOUNTER — OFFICE VISIT (OUTPATIENT)
Dept: OTOLARYNGOLOGY | Facility: CLINIC | Age: 49
End: 2024-06-03
Payer: COMMERCIAL

## 2024-06-03 VITALS — BODY MASS INDEX: 22.16 KG/M2 | WEIGHT: 133 LBS | HEIGHT: 65 IN | TEMPERATURE: 97.3 F

## 2024-06-03 DIAGNOSIS — J34.2 DEVIATED NASAL SEPTUM: ICD-10-CM

## 2024-06-03 DIAGNOSIS — J34.3 HYPERTROPHY OF BOTH INFERIOR NASAL TURBINATES: ICD-10-CM

## 2024-06-03 DIAGNOSIS — R09.82 POST-NASAL DRIP: ICD-10-CM

## 2024-06-03 DIAGNOSIS — J32.9 CHRONIC RHINOSINUSITIS: Primary | ICD-10-CM

## 2024-06-03 DIAGNOSIS — J32.9 RECURRENT SINUS INFECTIONS: ICD-10-CM

## 2024-06-03 DIAGNOSIS — R44.8 SENSATION OF PRESSURE IN FACE: ICD-10-CM

## 2024-06-03 PROCEDURE — 31231 NASAL ENDOSCOPY DX: CPT | Performed by: STUDENT IN AN ORGANIZED HEALTH CARE EDUCATION/TRAINING PROGRAM

## 2024-06-03 PROCEDURE — 99214 OFFICE O/P EST MOD 30 MIN: CPT | Performed by: STUDENT IN AN ORGANIZED HEALTH CARE EDUCATION/TRAINING PROGRAM

## 2024-06-03 NOTE — PROGRESS NOTES
Otolaryngology-- Head and Neck Surgery Follow up visit      Follow up:    10/30/23:  Cherie Arias, a 48-year-old, has come in with a primary concern that has been bothering her for the past two years, which is recurrent sinus infections. She has been prescribed multiple courses of antibiotics over the past few months to address this issue. Regarding her nose and sinuses, she reports symptoms such as nasal blockage, chronic nasal drainage, post-nasal drip, facial pressure, headaches, diminished sense of smell, a persistent cough, and frequent throat clearing. However, she denies any history of sneezing, itchy eyes, or nasal bleeding. Cherie has not undergone any nose or sinus surgeries, but she does have a history of bronchial asthma. There is no recent sinus CT scan or history of allergy skin testing. She mentions that Flonase causes headaches and also reports itching in her ears.     1/8/24:  Still complaining of PND and cough  Doxycycline  Prednisolone  Azelastine  Saline rinse  Did not help    6/3/24:  Here for CT sinus review. Was recently in the hospital for stroke work up.  She has been on 7-8 courses of antibiotics   Flonase spray gives her a headache. Astelin spray discontinued.   She uses nasal saline rinse occasionally.      Review of any relevant imaging:      Interval Review of systems: Pertinent review of systems documented in the HPI.    Interval Social History:  Social History     Socioeconomic History    Marital status: /Civil Union     Spouse name: Not on file    Number of children: Not on file    Years of education: Not on file    Highest education level: Not on file   Occupational History    Not on file   Tobacco Use    Smoking status: Every Day     Current packs/day: 0.50     Types: Cigarettes    Smokeless tobacco: Never   Vaping Use    Vaping status: Never Used   Substance and Sexual Activity    Alcohol use: Not Currently     Comment: Every now and then    Drug use: Not Currently  "    Types: Marijuana    Sexual activity: Not on file   Other Topics Concern    Not on file   Social History Narrative    Not on file     Social Determinants of Health     Financial Resource Strain: Low Risk  (5/17/2024)    Overall Financial Resource Strain (CARDIA)     Difficulty of Paying Living Expenses: Not very hard   Food Insecurity: No Food Insecurity (5/7/2024)    Hunger Vital Sign     Worried About Running Out of Food in the Last Year: Never true     Ran Out of Food in the Last Year: Never true   Transportation Needs: No Transportation Needs (5/7/2024)    PRAPARE - Transportation     Lack of Transportation (Medical): No     Lack of Transportation (Non-Medical): No   Physical Activity: Not on file   Stress: Not on file   Social Connections: Not on file   Intimate Partner Violence: Not on file   Housing Stability: Low Risk  (5/7/2024)    Housing Stability Vital Sign     Unable to Pay for Housing in the Last Year: No     Number of Places Lived in the Last Year: 1     Unstable Housing in the Last Year: No       Interval Physical Examination:  Temp (!) 97.3 °F (36.3 °C) (Temporal)   Ht 5' 5\" (1.651 m)   Wt 60.3 kg (133 lb)   BMI 22.13 kg/m²       Head: Atraumatic, no visible scalp lesions, parotid and submandibular salivary glands non-tender to palpation and without masses bilaterally.   Neck:  No visible or palpable cervical lesions or lymphadenopathy, thyroid gland is normal in size and symmetry and without masses, normal laryngeal elevation with swallowing.   Ears:    Right ear :  Auricle normal in appearance, mastoid prominence non-tender, external auditory canal clear. Tympanic membranes intact.   Left ear :  Auricle normal in appearance, mastoid prominence non-tender, external auditory canal clear . Tympanic membranes intact.   Nose/Sinuses:  External appearance unremarkable, no maxillary or frontal sinus tenderness to palpation bilaterally. Nasal endoscopic examination showed deviated nasal septum, " bilateral enlarged inferior turbinates, no polyps, thick clear mucus, middle, superior meatus and sphenoethmoid recess clear.     Oral Cavity:  Moist mucus membranes, gums and dentition unremarkable, no oral mucosal masses or lesions, floor of mouth soft, tongue mobile without masses or lesions.   Oropharynx:  Base of tongue soft and without masses, tonsils bilaterally unremarkable, soft palate mucosa unremarkable.      Eyes:  Extra-ocular movements intact, pupils equally round and reactive to light and accommodation, the lids and conjunctivae are normal in appearance.  Constitutional:  Well developed, well nourished and groomed, in no acute distress.   Cardiovascular:  Normal rate and rhythm, no palpable thrills, no jugulovenous distension observed.  Respiratory:  Normal respiratory effort without evidence of retractions or use of accessory muscles.  Neurologic:  Cranial nerves II-XII intact bilaterally.  Abdomen: Soft and lax  Extremities: No bruises   Psychiatric:  Alert and oriented to time, place and person.  Procedures  Rigid nasal endoscopic examination:  The nasal cavities were decongested with lidocaine and oxymetazoline spray.  Bilateral nasal endoscopy was performed as follows:  Endoscopy type: 0 degree rigid scope  Results: look above  The patient tolerated the procedure well.         Assessment:  1. Chronic rhinosinusitis        2. Deviated nasal septum        3. Sensation of pressure in face        4. Post-nasal drip        5. Hypertrophy of both inferior nasal turbinates        6. Recurrent sinus infections              Plan:    Chronic rhinosinusitis, Rhinitis and Post nasal drip   Discussed treatment options including use of saline rinses, nasal steroids, allergy medications, allergy testing, imaging, and further surgical interventions. Given instructions on use of nasal steroids, saline rinses and allergy medications.  Reviewed CT sinus which showed pansinusitis. Will defer surgery at this time  because she needs further work up for MRI findings with neurology. In the meantime will continue medical mgmt.

## 2024-06-28 ENCOUNTER — OFFICE VISIT (OUTPATIENT)
Age: 49
End: 2024-06-28

## 2024-06-28 VITALS
HEART RATE: 76 BPM | DIASTOLIC BLOOD PRESSURE: 90 MMHG | SYSTOLIC BLOOD PRESSURE: 135 MMHG | BODY MASS INDEX: 23.16 KG/M2 | RESPIRATION RATE: 18 BRPM | HEIGHT: 65 IN | TEMPERATURE: 97.2 F | OXYGEN SATURATION: 99 % | WEIGHT: 139 LBS

## 2024-06-28 DIAGNOSIS — Z12.12 ENCOUNTER FOR COLORECTAL CANCER SCREENING: ICD-10-CM

## 2024-06-28 DIAGNOSIS — I10 HYPERTENSION, UNSPECIFIED TYPE: ICD-10-CM

## 2024-06-28 DIAGNOSIS — Z72.0 NICOTINE ABUSE: ICD-10-CM

## 2024-06-28 DIAGNOSIS — Z12.31 BREAST CANCER SCREENING BY MAMMOGRAM: ICD-10-CM

## 2024-06-28 DIAGNOSIS — Z11.4 ENCOUNTER FOR SCREENING FOR HIV: ICD-10-CM

## 2024-06-28 DIAGNOSIS — E05.90 HYPERTHYROIDISM: Primary | ICD-10-CM

## 2024-06-28 DIAGNOSIS — Z12.11 ENCOUNTER FOR COLORECTAL CANCER SCREENING: ICD-10-CM

## 2024-06-28 DIAGNOSIS — Z11.59 NEED FOR HEPATITIS C SCREENING TEST: ICD-10-CM

## 2024-06-28 DIAGNOSIS — R29.90 STROKE-LIKE SYMPTOMS: ICD-10-CM

## 2024-06-28 PROCEDURE — 99214 OFFICE O/P EST MOD 30 MIN: CPT | Performed by: FAMILY MEDICINE

## 2024-06-28 RX ORDER — ASPIRIN 81 MG/1
81 TABLET, CHEWABLE ORAL DAILY
Qty: 30 TABLET | Refills: 0 | Status: SHIPPED | OUTPATIENT
Start: 2024-06-28

## 2024-06-28 RX ORDER — ATORVASTATIN CALCIUM 40 MG/1
40 TABLET, FILM COATED ORAL
Qty: 30 TABLET | Refills: 0 | Status: SHIPPED | OUTPATIENT
Start: 2024-06-28

## 2024-06-28 RX ORDER — METHIMAZOLE 5 MG/1
2.5 TABLET ORAL DAILY
Qty: 30 TABLET | Refills: 1 | Status: SHIPPED | OUTPATIENT
Start: 2024-06-28

## 2024-06-28 NOTE — PROGRESS NOTES
Ambulatory Visit  Name: Cherie Arias      : 1975      MRN: [id number]  Encounter Provider: Giovanna Burkett DO  Encounter Date: 2024   Encounter department: [facility]    Assessment & Plan   1. Hyperthyroidism  Assessment & Plan:  TSH was 0.29.  Free T4 0.85, free T3 1.2, TSI WNL, TPO WNL   ANCA WNL, ESR WNL, CRP WNL  Notch3 negative    Plan:  -Continue methimazole  -Continue follow up with endocrinology   -Pending thyroid US  Orders:  -     US thyroid; Future; Expected date: 2024  -     methimazole (TAPAZOLE) 5 mg tablet; Take 0.5 tablets (2.5 mg total) by mouth in the morning  2. Encounter for colorectal cancer screening  -     Ambulatory Referral to Gastroenterology; Future  3. Breast cancer screening by mammogram  -     Mammo screening bilateral w 3d & cad; Future  4. Need for hepatitis C screening test  -     Hepatitis C Ab W/Refl To HCV RNA, Qn, PCR; Future  5. Encounter for screening for HIV  -     HIV 1/2 AG/AB W REFLEX [facility] only; Future  -     HIV 1/2 AG/AB W REFLEX [facility] only  6. Nicotine abuse  Assessment & Plan:  Chronic, stable, continue nicotine replacement   Orders:  -     nicotine polacrilex (NICORETTE) 2 mg gum; Chew 1 each (2 mg total) as needed for smoking cessation  7. Stroke-like symptoms  Assessment & Plan:  Chronic, reporting her symptoms have completely resolved     Plan:  -Continue follow up with neurology  -Continue ASA and statin  Orders:  -     verapamil (CALAN-SR) 120 mg CR tablet; Take 1 tablet (120 mg total) by mouth daily at bedtime  -     atorvastatin (LIPITOR) 40 mg tablet; Take 1 tablet (40 mg total) by mouth daily with dinner  -     aspirin 81 mg chewable tablet; Chew 1 tablet (81 mg total) daily  8. Hypertension, unspecified type  Assessment & Plan:  Chronic, stable. Denies any hypotensive episodes. Continue verpamil 120 mg          History of Present Illness     HPI    Presents for follow  up of chronic conditions   No more stroke like symptoms  Has to follow up with endo and neuro   Has been using compression stockings for varicose veins   Has not needed albuterol - feeling tired with weather     ? Negative testing for hyperthyroidism  Told of hyperthyroidism in 1994   Never got ultrasound     Review of Systems   Constitutional:  Negative for fatigue and fever.   HENT:  Negative for congestion, postnasal drip, rhinorrhea, sneezing, sore throat and tinnitus.    Respiratory:  Negative for cough, shortness of breath and wheezing.    Cardiovascular:  Negative for chest pain and palpitations.   Gastrointestinal:  Negative for abdominal distention, abdominal pain, constipation, diarrhea, nausea and vomiting.   Musculoskeletal:  Negative for arthralgias, back pain, joint swelling, myalgias and neck stiffness.   Skin:  Negative for rash and wound.   Neurological:  Negative for weakness and numbness.     Past Medical History:   Diagnosis Date    Asthma     Disease of thyroid gland     Hypertension      Past Surgical History:   Procedure Laterality Date    TUBAL LIGATION       History reviewed. No pertinent family history.  Social History     Tobacco Use    Smoking status: Every Day     Current packs/day: 0.50     Types: Cigarettes    Smokeless tobacco: Never   Vaping Use    Vaping status: Never Used   Substance and Sexual Activity    Alcohol use: Not Currently     Comment: Every now and then    Drug use: Not Currently     Types: Marijuana    Sexual activity: Not on file     Current Outpatient Medications on File Prior to Visit   Medication Sig    albuterol (PROVENTIL HFA,VENTOLIN HFA) 90 mcg/act inhaler Inhale 2 puffs every 6 (six) hours as needed for wheezing    fluocinolone acetonide (DermOtic) 0.01 % otic oil Administer 5 drops into both ears 2 (two) times a day    timolol (TIMOPTIC) 0.5 % ophthalmic solution Apply 1 drop to eye daily     Allergies   Allergen Reactions    Shellfish-Derived Products - Food  "Allergy Hives       There is no immunization history on file for this patient.  Objective     /90 (BP Location: Right arm, Patient Position: Sitting, Cuff Size: Adult)   Pulse 76   Temp (!) 97.2 °F (36.2 °C) (Tympanic)   Resp 18   Ht 5' 5\" (1.651 m)   Wt 63 kg (139 lb)   SpO2 99%   BMI 23.13 kg/m²     Physical Exam  Constitutional:       Appearance: Normal appearance.   HENT:      Head: Normocephalic and atraumatic.      Nose: Nose normal.      Mouth/Throat:      Mouth: Mucous membranes are moist.      Pharynx: Oropharynx is clear.   Eyes:      Extraocular Movements: Extraocular movements intact.      Pupils: Pupils are equal, round, and reactive to light.   Cardiovascular:      Rate and Rhythm: Normal rate and regular rhythm.   Pulmonary:      Effort: Pulmonary effort is normal.      Breath sounds: Normal breath sounds.   Abdominal:      General: Bowel sounds are normal. There is no distension.      Palpations: Abdomen is soft.      Tenderness: There is no abdominal tenderness.   Musculoskeletal:         General: No swelling.      Cervical back: Normal range of motion and neck supple.   Skin:     General: Skin is warm and dry.   Neurological:      General: No focal deficit present.      Mental Status: She is alert.       Administrative Statements         "

## 2024-06-30 NOTE — ASSESSMENT & PLAN NOTE
TSH was 0.29.  Free T4 0.85, free T3 1.2, TSI WNL, TPO WNL   ANCA WNL, ESR WNL, CRP WNL  Notch3 negative    Plan:  -Continue methimazole  -Continue follow up with endocrinology   -Pending thyroid US

## 2024-06-30 NOTE — ASSESSMENT & PLAN NOTE
Chronic, reporting her symptoms have completely resolved     Plan:  -Continue follow up with neurology  -Continue ASA and statin

## 2024-07-17 ENCOUNTER — TELEPHONE (OUTPATIENT)
Age: 49
End: 2024-07-17

## 2024-07-17 ENCOUNTER — PATIENT MESSAGE (OUTPATIENT)
Age: 49
End: 2024-07-17

## 2024-07-17 NOTE — TELEPHONE ENCOUNTER
Scheduled date of colonoscopy (as of today): Monday 10/7/42773   Physician performing colonoscopy: Dr. Corral  Location of colonoscopy: [facility]  Bowel prep reviewed with patient: Miralax / Dulcolax  Instructions reviewed with patient by: Luis Fernando IBARRA - Sent via [employer]  Clearances: N/A

## 2024-07-17 NOTE — TELEPHONE ENCOUNTER
07/17/24  Screened by: Sybil Katz    Referring Provider PCP    Pre- Screening:     There is no height or weight on file to calculate BMI. BMI - 23.13  Has patient been referred for a routine screening Colonoscopy? yes  Is the patient between 45-75 years old? yes      Previous Colonoscopy no   If yes:    Date:     Facility:     Reason:         Does the patient want to see a Gastroenterologist prior to their procedure OR are they having any GI symptoms? no    Has the patient been hospitalized or had abdominal surgery in the past 6 months? no    Does the patient use supplemental oxygen? no    Does the patient take Coumadin, Lovenox, Plavix, Elliquis, Xarelto, or other blood thinning medication? no    Has the patient had a stroke, cardiac event, or stent placed in the past year? no    Scheduled date of colonoscopy (as of today):  Physician performing colonoscopy:  Location of colonoscopy:  Bowel prep reviewed with patient:  Instructions reviewed with patient by:  Clearances:   If patient is between 45yrs - 49yrs, please advise patient that we will have to confirm benefits & coverage with their insurance company for a routine screening colonoscopy.

## 2024-07-23 ENCOUNTER — HOSPITAL ENCOUNTER (OUTPATIENT)
Dept: RADIOLOGY | Facility: HOSPITAL | Age: 49
Discharge: HOME | End: 2024-07-23
Payer: COMMERCIAL

## 2024-07-23 DIAGNOSIS — I83.819 VARICOSE VEINS OF UNSPECIFIED LOWER EXTREMITY WITH PAIN: ICD-10-CM

## 2024-07-23 PROCEDURE — 93970 EXTREMITY STUDY: CPT | Performed by: SURGERY

## 2024-07-23 PROCEDURE — 93970 EXTREMITY STUDY: CPT

## 2024-07-26 ENCOUNTER — APPOINTMENT (OUTPATIENT)
Dept: LAB | Facility: HOSPITAL | Age: 49
End: 2024-07-26
Payer: COMMERCIAL

## 2024-07-26 DIAGNOSIS — Z11.59 NEED FOR HEPATITIS C SCREENING TEST: ICD-10-CM

## 2024-07-26 DIAGNOSIS — Z13.21 ENCOUNTER FOR VITAMIN DEFICIENCY SCREENING: ICD-10-CM

## 2024-07-26 DIAGNOSIS — Z11.4 ENCOUNTER FOR SCREENING FOR HIV: ICD-10-CM

## 2024-07-26 DIAGNOSIS — E05.90 HYPERTHYROIDISM: ICD-10-CM

## 2024-07-26 LAB
25(OH)D3 SERPL-MCNC: 20.6 NG/ML (ref 30–100)
T3FREE SERPL-MCNC: 3.42 PG/ML (ref 2.5–3.9)
T4 FREE SERPL-MCNC: 0.84 NG/DL (ref 0.61–1.12)
TSH SERPL DL<=0.05 MIU/L-ACNC: 0.33 UIU/ML (ref 0.45–4.5)

## 2024-07-26 PROCEDURE — 87389 HIV-1 AG W/HIV-1&-2 AB AG IA: CPT

## 2024-07-26 PROCEDURE — 82306 VITAMIN D 25 HYDROXY: CPT

## 2024-07-26 PROCEDURE — 36415 COLL VENOUS BLD VENIPUNCTURE: CPT

## 2024-07-26 PROCEDURE — 86803 HEPATITIS C AB TEST: CPT

## 2024-07-26 PROCEDURE — 84439 ASSAY OF FREE THYROXINE: CPT

## 2024-07-26 PROCEDURE — 84443 ASSAY THYROID STIM HORMONE: CPT

## 2024-07-26 PROCEDURE — 84481 FREE ASSAY (FT-3): CPT

## 2024-07-27 LAB — HIV 1+2 AB+HIV1 P24 AG SERPL QL IA: NON REACTIVE

## 2024-07-28 DIAGNOSIS — Z72.0 NICOTINE ABUSE: ICD-10-CM

## 2024-07-29 ENCOUNTER — TELEPHONE (OUTPATIENT)
Age: 49
End: 2024-07-29

## 2024-07-29 DIAGNOSIS — R29.90 STROKE-LIKE SYMPTOMS: ICD-10-CM

## 2024-07-29 DIAGNOSIS — E55.9 VITAMIN D DEFICIENCY: Primary | ICD-10-CM

## 2024-07-29 LAB — MISCELLANEOUS LAB TEST RESULT: NORMAL

## 2024-07-29 RX ORDER — ATORVASTATIN CALCIUM 40 MG/1
40 TABLET, FILM COATED ORAL
Qty: 30 TABLET | Refills: 0 | Status: SHIPPED | OUTPATIENT
Start: 2024-07-29

## 2024-08-02 ENCOUNTER — OFFICE VISIT (OUTPATIENT)
Age: 49
End: 2024-08-02

## 2024-08-02 VITALS
DIASTOLIC BLOOD PRESSURE: 90 MMHG | OXYGEN SATURATION: 99 % | HEIGHT: 65 IN | TEMPERATURE: 97.9 F | SYSTOLIC BLOOD PRESSURE: 138 MMHG | BODY MASS INDEX: 23.82 KG/M2 | WEIGHT: 143 LBS | HEART RATE: 90 BPM

## 2024-08-02 DIAGNOSIS — I10 PRIMARY HYPERTENSION: ICD-10-CM

## 2024-08-02 DIAGNOSIS — H53.8 BLURRY VISION: ICD-10-CM

## 2024-08-02 DIAGNOSIS — F41.9 ANXIETY AND DEPRESSION: ICD-10-CM

## 2024-08-02 DIAGNOSIS — J45.20 MILD INTERMITTENT ASTHMA WITHOUT COMPLICATION: ICD-10-CM

## 2024-08-02 DIAGNOSIS — Z00.00 ANNUAL PHYSICAL EXAM: Primary | ICD-10-CM

## 2024-08-02 DIAGNOSIS — Z72.0 NICOTINE ABUSE: ICD-10-CM

## 2024-08-02 DIAGNOSIS — Z59.819 HOUSING INSECURITY: ICD-10-CM

## 2024-08-02 DIAGNOSIS — F32.A ANXIETY AND DEPRESSION: ICD-10-CM

## 2024-08-02 DIAGNOSIS — Z11.59 NEED FOR HEPATITIS C SCREENING TEST: ICD-10-CM

## 2024-08-02 DIAGNOSIS — E05.90 HYPERTHYROIDISM: ICD-10-CM

## 2024-08-02 DIAGNOSIS — I83.813 VARICOSE VEINS OF BILATERAL LOWER EXTREMITIES WITH PAIN: ICD-10-CM

## 2024-08-02 DIAGNOSIS — G47.00 INSOMNIA, UNSPECIFIED TYPE: ICD-10-CM

## 2024-08-02 RX ORDER — PAROXETINE 10 MG/1
10 TABLET, FILM COATED ORAL DAILY
Qty: 30 TABLET | Refills: 1 | Status: SHIPPED | OUTPATIENT
Start: 2024-08-02

## 2024-08-02 RX ORDER — BUSPIRONE HYDROCHLORIDE 5 MG/1
5 TABLET ORAL 2 TIMES DAILY
Qty: 60 TABLET | Refills: 5 | Status: SHIPPED | OUTPATIENT
Start: 2024-08-02

## 2024-08-02 RX ORDER — LISINOPRIL 10 MG/1
5 TABLET ORAL DAILY
Qty: 30 TABLET | Refills: 1 | Status: SHIPPED | OUTPATIENT
Start: 2024-08-02

## 2024-08-02 SDOH — ECONOMIC STABILITY - HOUSING INSECURITY: HOUSING INSTABILITY UNSPECIFIED: Z59.819

## 2024-08-02 NOTE — PROGRESS NOTES
Adult Annual Physical  Name: Cherie Arias      : 1975      MRN: [id number]  Encounter Provider: Giovanna Burkett DO  Encounter Date: 2024   Encounter department: [facility]    Assessment & Plan   1. Annual physical exam  2. Housing insecurity  -     Ambulatory Referral to Social Work Care Management Program; Future  3. Primary hypertension  Assessment & Plan:  Chronic, patient april hypertensive at home noting that she has had some elevated diastolic pressures greater than 90, noting that her typical diastolic blood pressure is approximately 94.    Goal BP less than 140/90    Plan:  - Start lisinopril 5 mg daily  -Continue verapamil 120 mg at bedtime  Orders:  -     lisinopril (ZESTRIL) 10 mg tablet; Take 0.5 tablets (5 mg total) by mouth daily  4. Insomnia, unspecified type  Assessment & Plan:  Chronic, patient noting that she has been unable to initiate sleep for the past several weeks as she has had an increase in life stressors.  Patient reporting that she has had an increase in her anxiety symptoms, noting that she has had recent falls and has been unable to initiate sleep.    Plan:  - Start sleep journal  -Rest for anxiety and depression  5. Blurry vision  -     Ambulatory Referral to Ophthalmology; Future  6. Mild intermittent asthma without complication  Assessment & Plan:  Chronic, stable denies any worsening asthma symptoms.  Denies any acute exacerbations, no indication to start maintenance inhaler at this time.  Discussed that if patient has any worsening symptoms or any acute exacerbations, plan to start maintenance inhaler  7. Hyperthyroidism  Assessment & Plan:  TSH was 0.29.  Free T4 0.85, free T3 1.2, TSI WNL, TPO WNL   ANCA WNL, ESR WNL, CRP WNL  Notch3 negative     Plan:  -Continue methimazole  -Continue follow up with endocrinology as scheduled  -Pending thyroid US  8. Varicose veins of bilateral lower extremities with pain  Assessment &  Plan:  Chronic, stable patient notes that she has been following with vascular surgery on outpatient basis.  Advised to continue use of compression stockings and increasing exercise  9. Anxiety and depression  Assessment & Plan:  Depression symptoms predominant, noting that anxiety started very recently as patient was told that she had to move out of her house within 1 week.  In the setting of her life stressors - she would like to trial a medication for more appropriate symptom control.  Denies any suicidal or homicidal ideation.    Patient additionally noting that she has had a hard time relaxing, sleeping, eating.  In setting of hyperthyroidism, low index of suspicion that thyroid dysfunction is related to her anxiety, suspect that is primary related to her use living condition    Plan:  -Start Paxil 10 mg  -Start BuSpar 5 mg 3 times daily as needed for symptoms  Orders:  -     PARoxetine (PAXIL) 10 mg tablet; Take 1 tablet (10 mg total) by mouth daily  -     Ambulatory referral to Psych Services; Future  -     busPIRone (BUSPAR) 5 mg tablet; Take 1 tablet (5 mg total) by mouth 2 (two) times a day  10. Nicotine abuse  Assessment & Plan:  Chronic, stable continue to recommend nicotine replacement and cessation as able to tolerate  11. Need for hepatitis C screening test  -     Hepatitis C Antibody; Future  -     Hepatitis C Antibody    Immunizations and preventive care screenings were discussed with patient today. Appropriate education was printed on patient's after visit summary.    Counseling:  Alcohol/drug use: discussed moderation in alcohol intake, the recommendations for healthy alcohol use, and avoidance of illicit drug use.  Dental Health: discussed importance of regular tooth brushing, flossing, and dental visits.  Injury prevention: discussed safety/seat belts, safety helmets, smoke detectors, carbon dioxide detectors, and smoking near bedding or upholstery.  Sexual health: discussed sexually transmitted  diseases, partner selection, use of condoms, avoidance of unintended pregnancy, and contraceptive alternatives.  Exercise: the importance of regular exercise/physical activity was discussed. Recommend exercise 3-5 times per week for at least 30 minutes.       Depression Screening and Follow-up Plan: Patient's depression screening was positive with a PHQ-9 score of 15. Patient assessed for underlying major depression. Brief counseling provided and recommend additional follow-up/re-evaluation next office visit. Patient with underlying depression and was advised to continue current medications as prescribed. Patient advised to follow-up with PCP for further management.         History of Present Illness     Adult Annual Physical:  Patient presents for annual physical. Reporting she has been having a lot of depression episodes   Noting that she has been having blurry vision - needs to be see eye doctor   Reporting that she is having housing insecurity - current landlord needs her out within one week  Stating that she currently has someone who is considering her for an apartment in [place]   Reporting that she is unable to fall asleep - stating that she is laying in bed but cannot fall asleep - melatonin makes her drowsy .     Diet and Physical Activity:  - Diet/Nutrition: well balanced diet.  - Exercise: no formal exercise.    Depression Screening:    - PHQ-9 Score: 15    General Health:  - Sleep: sleeps poorly.  - Hearing: normal hearing bilateral ears.  - Vision: vision problems.    /GYN Health:  - Follows with GYN: no.   - Menopause: perimenopausal.   - Last menstrual cycle: 2/10/2024.   - History of STDs: no  - Contraception:. tubal ligation      Review of Systems   Constitutional:  Negative for fatigue and fever.   HENT:  Negative for congestion, postnasal drip, rhinorrhea, sneezing, sore throat and tinnitus.    Respiratory:  Negative for cough, shortness of breath and wheezing.    Cardiovascular:  Negative for  "chest pain and palpitations.   Gastrointestinal:  Negative for abdominal distention, abdominal pain, constipation, diarrhea, nausea and vomiting.   Musculoskeletal:  Negative for arthralgias, back pain, joint swelling, myalgias and neck stiffness.   Skin:  Negative for rash and wound.   Neurological:  Negative for weakness and numbness.   Psychiatric/Behavioral:  Positive for decreased concentration, dysphoric mood and sleep disturbance. Negative for self-injury and suicidal ideas. The patient is nervous/anxious.          Objective     /90 (BP Location: Left arm, Patient Position: Sitting, Cuff Size: Standard)   Pulse 90   Temp 97.9 °F (36.6 °C) (Tympanic)   Ht 5' 5\" (1.651 m)   Wt 64.9 kg (143 lb)   SpO2 99%   BMI 23.80 kg/m²     Physical Exam  Constitutional:       Appearance: Normal appearance.   HENT:      Head: Normocephalic and atraumatic.      Nose: Nose normal.      Mouth/Throat:      Mouth: Mucous membranes are moist.      Pharynx: Oropharynx is clear.   Eyes:      Extraocular Movements: Extraocular movements intact.      Pupils: Pupils are equal, round, and reactive to light.   Cardiovascular:      Rate and Rhythm: Normal rate and regular rhythm.   Pulmonary:      Effort: Pulmonary effort is normal.      Breath sounds: Normal breath sounds.   Abdominal:      General: Bowel sounds are normal. There is no distension.      Palpations: Abdomen is soft.      Tenderness: There is no abdominal tenderness.   Musculoskeletal:         General: No swelling.      Cervical back: Normal range of motion and neck supple.   Skin:     General: Skin is warm and dry.   Neurological:      General: No focal deficit present.      Mental Status: She is alert.         "

## 2024-08-03 NOTE — ASSESSMENT & PLAN NOTE
Chronic, patient april hypertensive at home noting that she has had some elevated diastolic pressures greater than 90, noting that her typical diastolic blood pressure is approximately 94.    Goal BP less than 140/90    Plan:  - Start lisinopril 5 mg daily  -Continue verapamil 120 mg at bedtime

## 2024-08-03 NOTE — ASSESSMENT & PLAN NOTE
Chronic, patient noting that she has been unable to initiate sleep for the past several weeks as she has had an increase in life stressors.  Patient reporting that she has had an increase in her anxiety symptoms, noting that she has had recent falls and has been unable to initiate sleep.    Plan:  - Start sleep journal  -Rest for anxiety and depression

## 2024-08-03 NOTE — ASSESSMENT & PLAN NOTE
TSH was 0.29.  Free T4 0.85, free T3 1.2, TSI WNL, TPO WNL   ANCA WNL, ESR WNL, CRP WNL  Notch3 negative     Plan:  -Continue methimazole  -Continue follow up with endocrinology as scheduled  -Pending thyroid US

## 2024-08-03 NOTE — ASSESSMENT & PLAN NOTE
Chronic, stable patient notes that she has been following with vascular surgery on outpatient basis.  Advised to continue use of compression stockings and increasing exercise

## 2024-08-03 NOTE — ASSESSMENT & PLAN NOTE
Depression symptoms predominant, noting that anxiety started very recently as patient was told that she had to move out of her house within 1 week.  In the setting of her life stressors - she would like to trial a medication for more appropriate symptom control.  Denies any suicidal or homicidal ideation.    Patient additionally noting that she has had a hard time relaxing, sleeping, eating.  In setting of hyperthyroidism, low index of suspicion that thyroid dysfunction is related to her anxiety, suspect that is primary related to her use living condition    Plan:  -Start Paxil 10 mg  -Start BuSpar 5 mg 3 times daily as needed for symptoms

## 2024-08-03 NOTE — ASSESSMENT & PLAN NOTE
Chronic, stable denies any worsening asthma symptoms.  Denies any acute exacerbations, no indication to start maintenance inhaler at this time.  Discussed that if patient has any worsening symptoms or any acute exacerbations, plan to start maintenance inhaler

## 2024-08-05 ENCOUNTER — OFFICE VISIT (OUTPATIENT)
Dept: OBGYN CLINIC | Facility: CLINIC | Age: 49
End: 2024-08-05
Payer: COMMERCIAL

## 2024-08-05 ENCOUNTER — TELEPHONE (OUTPATIENT)
Age: 49
End: 2024-08-05

## 2024-08-05 VITALS
DIASTOLIC BLOOD PRESSURE: 87 MMHG | SYSTOLIC BLOOD PRESSURE: 134 MMHG | BODY MASS INDEX: 23.82 KG/M2 | WEIGHT: 143 LBS | HEART RATE: 76 BPM | HEIGHT: 65 IN

## 2024-08-05 DIAGNOSIS — M54.12 CERVICAL RADICULOPATHY: Primary | ICD-10-CM

## 2024-08-05 PROCEDURE — 99213 OFFICE O/P EST LOW 20 MIN: CPT | Performed by: ORTHOPAEDIC SURGERY

## 2024-08-05 NOTE — TELEPHONE ENCOUNTER
Contacted patient in regards to Routine Referral in attempts to verify patient's needs of services and add patient to proper wait list. Pt has [place] Medicaid and is ineligible for services with [facility]. Writer offered extra resource packet and patient accepted.      [email or web address]

## 2024-08-05 NOTE — PROGRESS NOTES
Assessment/Plan:  1. Cervical radiculopathy  Ambulatory Referral to Physical Therapy        Scribe Attestation      I,:  Harshad Holland MA am acting as a scribe while in the presence of the attending physician.:       I,:  Be Uriarte MD personally performed the services described in this documentation    as scribed in my presence.:               Cherie continues to present with signs and symptoms consistent with cervical radiculopathy.  Unfortunately she did not have an opportunity to attend physical therapy for this.  My opinion this is her best option.  I would like her to attend physical therapy for the next 6 to 8 weeks.  If there is no improvement we will obtain x-rays and possible MRI.    Subjective:   Cherie Arias is a 49 y.o. female who presents for follow-up evaluation of left-sided neck pain with radicular symptoms and shoulder stiffness.  She complains of an intermittent ache in the posterior aspect of the left shoulder as well as an intermittent tingling sensation to the hand particularly when she is sleeping.  She has not had a recent injury.  Previously she was diagnosed with neck pain and cervical radiculopathy and prescribed physical therapy.  Due to other issues she was unable to attend physical therapy.  She states she is interested beginning therapy now.      Review of Systems   Constitutional:  Negative for chills, fever and unexpected weight change.   HENT:  Negative for hearing loss, nosebleeds and sore throat.    Eyes:  Negative for pain, redness and visual disturbance.   Respiratory:  Negative for cough, shortness of breath and wheezing.    Cardiovascular:  Negative for chest pain, palpitations and leg swelling.   Gastrointestinal:  Negative for abdominal pain, nausea and vomiting.   Endocrine: Negative for polydipsia and polyuria.   Genitourinary:  Negative for dysuria and hematuria.   Musculoskeletal:         See HPI   Skin:  Negative for rash and wound.   Neurological:   Negative for dizziness, numbness and headaches.   Psychiatric/Behavioral:  Negative for decreased concentration and suicidal ideas. The patient is not nervous/anxious.          Past Medical History:   Diagnosis Date    Asthma     Disease of thyroid gland     Hypertension        Past Surgical History:   Procedure Laterality Date    TUBAL LIGATION         History reviewed. No pertinent family history.    Social History     Occupational History    Not on file   Tobacco Use    Smoking status: Every Day     Current packs/day: 0.50     Types: Cigarettes    Smokeless tobacco: Never   Vaping Use    Vaping status: Never Used   Substance and Sexual Activity    Alcohol use: Yes     Comment: Every now and then    Drug use: Not Currently     Types: Marijuana    Sexual activity: Not on file         Current Outpatient Medications:     albuterol (PROVENTIL HFA,VENTOLIN HFA) 90 mcg/act inhaler, Inhale 2 puffs every 6 (six) hours as needed for wheezing, Disp: , Rfl:     aspirin 81 mg chewable tablet, Chew 1 tablet (81 mg total) daily, Disp: 30 tablet, Rfl: 0    atorvastatin (LIPITOR) 40 mg tablet, TAKE 1 TABLET BY MOUTH DAILY WITH DINNER, Disp: 30 tablet, Rfl: 0    busPIRone (BUSPAR) 5 mg tablet, Take 1 tablet (5 mg total) by mouth 2 (two) times a day, Disp: 60 tablet, Rfl: 5    fluocinolone acetonide (DermOtic) 0.01 % otic oil, Administer 5 drops into both ears 2 (two) times a day, Disp: 20 mL, Rfl: 0    lisinopril (ZESTRIL) 10 mg tablet, Take 0.5 tablets (5 mg total) by mouth daily, Disp: 30 tablet, Rfl: 1    methimazole (TAPAZOLE) 5 mg tablet, Take 0.5 tablets (2.5 mg total) by mouth in the morning, Disp: 30 tablet, Rfl: 1    nicotine polacrilex (NICORETTE) 2 mg gum, CHEW 1 EACH (2 MG TOTAL) AS NEEDED FOR SMOKING CESSATION, Disp: 30 each, Rfl: 0    PARoxetine (PAXIL) 10 mg tablet, Take 1 tablet (10 mg total) by mouth daily, Disp: 30 tablet, Rfl: 1    timolol (TIMOPTIC) 0.5 % ophthalmic solution, Apply 1 drop to eye daily, Disp: 10  mL, Rfl: 0    verapamil (CALAN-SR) 120 mg CR tablet, TAKE 1 TABLET (120 MG TOTAL) BY MOUTH DAILY AT BEDTIME, Disp: 30 tablet, Rfl: 0    Cholecalciferol (VITAMIN D3) 1,000 units tablet, Take 1 tablet (1,000 Units total) by mouth daily (Patient not taking: Reported on 8/5/2024), Disp: 30 tablet, Rfl: 1    Allergies   Allergen Reactions    Shellfish-Derived Products - Food Allergy Hives       Objective:  Vitals:    08/05/24 0805   BP: 134/87   Pulse: 76         Back Exam     Tenderness   The patient is experiencing tenderness in the cervical (Left paraspinal, left trapezius).    Range of Motion   Extension:  normal   Flexion:  normal   Lateral bend right:  normal   Lateral bend left:  normal   Rotation right:  normal   Rotation left:  normal     Other   Sensation: normal    Comments:  Negative Spurling's      Left Shoulder Exam     Range of Motion   Active abduction:  160   External rotation:  90   Forward flexion:  180   Internal rotation 0 degrees:  L3     Muscle Strength   Abduction: 5/5   Internal rotation: 5/5   External rotation: 5/5   Supraspinatus: 5/5   Subscapularis: 5/5   Biceps: 5/5     Tests   Impingement: negative    Other   Sensation: normal  Pulse: present (2+ radial)     Comments:  Normal motor function about the median, ulnar and radial distributions.  Normal strength.            Physical Exam  Vitals reviewed.   Constitutional:       Appearance: She is well-developed.   HENT:      Head: Normocephalic and atraumatic.   Eyes:      General:         Right eye: No discharge.         Left eye: No discharge.      Conjunctiva/sclera: Conjunctivae normal.   Cardiovascular:      Rate and Rhythm: Regular rhythm.   Pulmonary:      Effort: Pulmonary effort is normal. No respiratory distress.      Breath sounds: No stridor.   Musculoskeletal:      Cervical back: Normal range of motion and neck supple.      Comments: As noted in the HPI.   Skin:     General: Skin is warm and dry.   Neurological:      Mental Status:  She is alert and oriented to person, place, and time.   Psychiatric:         Behavior: Behavior normal.               This document was created using speech voice recognition software.   Grammatical errors, random word insertions, pronoun errors, and incomplete sentences are an occasional consequence of this system due to software limitations, ambient noise, and hardware issues.   Any formal questions or concerns about content, text, or information contained within the body of this dictation should be directly addressed to the provider for clarification.

## 2024-08-05 NOTE — PROGRESS NOTES
Orthopedic Sports Medicine Follow-up Patient Visit     Assesment:   49 y.o. female with     Plan:    ***        Follow up:    No follow-ups on file.        Chief Complaint   Patient presents with    Left Shoulder - Follow-up    Neck - Follow-up       History of Present Illness:    The patient is a 49 y.o., right hand dominant, female, who presents for follow-up evaluation of myofascial neck pain and left rotator cuff tendinitis.     Shoulder Surgical History:  None    Past Medical, Social and Family History:  Past Medical History:   Diagnosis Date    Asthma     Disease of thyroid gland     Hypertension      Past Surgical History:   Procedure Laterality Date    TUBAL LIGATION       Allergies   Allergen Reactions    Shellfish-Derived Products - Food Allergy Hives     Current Outpatient Medications on File Prior to Visit   Medication Sig Dispense Refill    albuterol (PROVENTIL HFA,VENTOLIN HFA) 90 mcg/act inhaler Inhale 2 puffs every 6 (six) hours as needed for wheezing      aspirin 81 mg chewable tablet Chew 1 tablet (81 mg total) daily 30 tablet 0    atorvastatin (LIPITOR) 40 mg tablet TAKE 1 TABLET BY MOUTH DAILY WITH DINNER 30 tablet 0    busPIRone (BUSPAR) 5 mg tablet Take 1 tablet (5 mg total) by mouth 2 (two) times a day 60 tablet 5    fluocinolone acetonide (DermOtic) 0.01 % otic oil Administer 5 drops into both ears 2 (two) times a day 20 mL 0    lisinopril (ZESTRIL) 10 mg tablet Take 0.5 tablets (5 mg total) by mouth daily 30 tablet 1    methimazole (TAPAZOLE) 5 mg tablet Take 0.5 tablets (2.5 mg total) by mouth in the morning 30 tablet 1    nicotine polacrilex (NICORETTE) 2 mg gum CHEW 1 EACH (2 MG TOTAL) AS NEEDED FOR SMOKING CESSATION 30 each 0    PARoxetine (PAXIL) 10 mg tablet Take 1 tablet (10 mg total) by mouth daily 30 tablet 1    timolol (TIMOPTIC) 0.5 % ophthalmic solution Apply 1 drop to eye daily 10 mL 0    verapamil (CALAN-SR) 120 mg CR tablet TAKE 1 TABLET (120 MG TOTAL) BY MOUTH DAILY AT  BEDTIME 30 tablet 0    Cholecalciferol (VITAMIN D3) 1,000 units tablet Take 1 tablet (1,000 Units total) by mouth daily (Patient not taking: Reported on 8/5/2024) 30 tablet 1     No current facility-administered medications on file prior to visit.     Social History     Socioeconomic History    Marital status: /Civil Union     Spouse name: Not on file    Number of children: Not on file    Years of education: Not on file    Highest education level: Not on file   Occupational History    Not on file   Tobacco Use    Smoking status: Every Day     Current packs/day: 0.50     Types: Cigarettes    Smokeless tobacco: Never   Vaping Use    Vaping status: Never Used   Substance and Sexual Activity    Alcohol use: Yes     Comment: Every now and then    Drug use: Not Currently     Types: Marijuana    Sexual activity: Not on file   Other Topics Concern    Not on file   Social History Narrative    Not on file     Social Determinants of Health     Financial Resource Strain: Low Risk  (5/17/2024)    Overall Financial Resource Strain (CARDIA)     Difficulty of Paying Living Expenses: Not very hard   Food Insecurity: No Food Insecurity (5/7/2024)    Hunger Vital Sign     Worried About Running Out of Food in the Last Year: Never true     Ran Out of Food in the Last Year: Never true   Transportation Needs: No Transportation Needs (5/7/2024)    PRAPARE - Transportation     Lack of Transportation (Medical): No     Lack of Transportation (Non-Medical): No   Physical Activity: Not on file   Stress: Not on file   Social Connections: Not on file   Intimate Partner Violence: Not on file   Housing Stability: Low Risk  (5/7/2024)    Housing Stability Vital Sign     Unable to Pay for Housing in the Last Year: No     Number of Times Moved in the Last Year: 1     Homeless in the Last Year: No         I have reviewed the past medical, surgical, social and family history, medications and allergies as documented in the EMR.    Review of  "systems: ROS is negative other than that noted in the HPI.  Constitutional: Negative for fatigue and fever.   HENT: Negative for sore throat.    Respiratory: Negative for shortness of breath.    Cardiovascular: Negative for chest pain.   Gastrointestinal: Negative for abdominal pain.   Endocrine: Negative for cold intolerance and heat intolerance.   Genitourinary: Negative for flank pain.   Musculoskeletal: Negative for back pain.   Skin: Negative for rash.   Allergic/Immunologic: Negative for immunocompromised state.   Neurological: Negative for dizziness.   Psychiatric/Behavioral: Negative for agitation.      Physical Exam:    Blood pressure 134/87, pulse 76, height 5' 5\" (1.651 m), weight 64.9 kg (143 lb).    General/Constitutional: NAD, well developed, well nourished  HENT: Normocephalic, atraumatic  CV: Intact distal pulses, regular rate  Resp: No respiratory distress or labored breathing  Abdomen: soft, nondistended, non tender   Lymphatic: No lymphadenopathy palpated  Neuro: Alert and Oriented x 3, no focal deficits  Psych: Normal mood, normal affect  Skin: Warm, dry, no rashes, no erythema      Shoulder Exam:      Inspection: No ecchymosis, edema, or deformity. No visualized wounds or skin lesions   Palpation: ***  Active Motion:       FF: ***        ER: ***        IR: ***  Strength: ***/5 empty can, ***/5 ER,  ***/5 IR   Sensory - SILT in the Radial / Ulnar / Median / Axillary nerve distributions  Motor - AIN / PIN / Radial / Ulnar / Median / Axillary motor nerves in tact  Palpable Radial pulse  Cap refill <2secs in all digits    *** Castillo  *** Delmis's  *** Belly Press  *** Bear Hug  *** Internal Rotation Lag Sign     *** Melo  *** Speed     *** Apprehension   *** Load and shift   *** Sulcus     *** Spurling        Scribe Attestation      I,:  Mabel Beck am acting as a scribe while in the presence of the attending physician.:       I,:  Be Uriarte MD personally performed the services " described in this documentation    as scribed in my presence.:

## 2024-08-08 ENCOUNTER — HOSPITAL ENCOUNTER (OUTPATIENT)
Dept: RADIOLOGY | Facility: HOSPITAL | Age: 49
Discharge: HOME | End: 2024-08-08
Payer: COMMERCIAL

## 2024-08-08 DIAGNOSIS — E05.90 HYPERTHYROIDISM: ICD-10-CM

## 2024-08-08 PROCEDURE — 76536 US EXAM OF HEAD AND NECK: CPT

## 2024-08-12 ENCOUNTER — TELEPHONE (OUTPATIENT)
Dept: OTHER | Facility: HOSPITAL | Age: 49
End: 2024-08-12

## 2024-08-13 ENCOUNTER — TELEPHONE (OUTPATIENT)
Dept: NEUROLOGY | Facility: CLINIC | Age: 49
End: 2024-08-13

## 2024-08-14 ENCOUNTER — TELEPHONE (OUTPATIENT)
Dept: OTHER | Facility: HOSPITAL | Age: 49
End: 2024-08-14

## 2024-08-19 ENCOUNTER — TELEMEDICINE (OUTPATIENT)
Age: 49
End: 2024-08-19

## 2024-08-19 DIAGNOSIS — Z59.819 HOUSING INSECURITY: ICD-10-CM

## 2024-08-19 DIAGNOSIS — E05.90 HYPERTHYROIDISM: Primary | ICD-10-CM

## 2024-08-19 DIAGNOSIS — F41.9 ANXIETY AND DEPRESSION: ICD-10-CM

## 2024-08-19 DIAGNOSIS — E55.9 VITAMIN D DEFICIENCY: ICD-10-CM

## 2024-08-19 DIAGNOSIS — F32.A ANXIETY AND DEPRESSION: ICD-10-CM

## 2024-08-19 DIAGNOSIS — G47.00 INSOMNIA, UNSPECIFIED TYPE: ICD-10-CM

## 2024-08-19 PROCEDURE — 99213 OFFICE O/P EST LOW 20 MIN: CPT | Performed by: FAMILY MEDICINE

## 2024-08-19 RX ORDER — PAROXETINE 10 MG/1
20 TABLET, FILM COATED ORAL DAILY
Qty: 30 TABLET | Refills: 1 | Status: SHIPPED | OUTPATIENT
Start: 2024-08-19

## 2024-08-19 SDOH — ECONOMIC STABILITY - HOUSING INSECURITY: HOUSING INSTABILITY UNSPECIFIED: Z59.819

## 2024-08-19 NOTE — PROGRESS NOTES
Virtual Regular Visit  Name: Cherie Arias      : 1975      MRN: [id number]  Encounter Provider: Giovanna Burkett DO  Encounter Date: 2024   Encounter department: [facility]    Verification of patient location:    Patient is located at Other in the following state in which I hold an active license [place]    Assessment & Plan   1. Hyperthyroidism  Assessment & Plan:  TSH was 0.29.  Free T4 0.85, free T3 1.2, TSI WNL, TPO WNL   ANCA WNL, ESR WNL, CRP WNL  Notch3 negative    Thyroid US: Enlarged gland with multiple nodules which do not technically meet current ACR criteria for follow-up ultrasound recommendation.     Denying any worsening symptoms with exception for anxiety (suspect secondary to life circumstances).     Plan:  -Continue follow-up with endocrinology as scheduled  -Continue methimazole  2. Anxiety and depression  Assessment & Plan:  Noting that symptoms include racing thoughts and difficulty sleeping.  Noting that she recently has had housing insecurity as she has had to move out of her old apartment and move in with her daughter.  She has been unable to find another apartment for herself and noting that it has been difficult to adjust to living with her daughter.  Patient was started on Paxil 10 mg QD and BuSpar 5 mg TID at last visit    KALYN 18 (at last visit)    Plan:  -Increase Paxil to 20 mg daily  -Continue BuSpar 5 mg 3 times daily PRN  -Recommend counseling, patient provided with list of resources  Orders:  -     PARoxetine (PAXIL) 10 mg tablet; Take 2 tablets (20 mg total) by mouth daily  3. Vitamin D deficiency  -     Cholecalciferol (VITAMIN D3) 1,000 units tablet; Take 1 tablet (1,000 Units total) by mouth daily  4. Housing insecurity  Comments:  Noting that she did move out of apartment, has been unable to find housing  5. Insomnia, unspecified type  Assessment & Plan:  Chronic, stable since previous visit.  Noticing that she is still  having difficulty with sleep initiation for the past several weeks, noting that her primary concern is currently racing thoughts.  Stating that she has been unable to do the sleep journal as she has not had access to the resources.  Suspect worsening insomnia secondary to anxiety    Plan:  -Consider melatonin   -Recommend sleep journal  -See rest of plan per  anxiety/depression         Encounter provider Giovanna Burkett, DO    The patient was identified by name and date of birth. Cherie Arias was informed that this is a telemedicine visit and that the visit is being conducted through the Epic Embedded platform. She agrees to proceed..  My office door was closed. No one else was in the room.  She acknowledged consent and understanding of privacy and security of the video platform. The patient has agreed to participate and understands they can discontinue the visit at any time.    Patient is aware this is a billable service.     History of Present Illness     HPI  Patient presents with the following concerns:  -Noting she is still having housing insecurity at home.   -Reviewed thyroid US during previous phone call and this appointment.  stating she has upcoming appointment with endocrinology and will report  -Noting she is having housing insecurity - will follow up with social work   -Noting she needs vitamin D refill   -Stating she is still having some anxiety and insomnia from racing thoughts.  Stating that the medication has helped some but not completely    Review of Systems   Constitutional:  Negative for fatigue and fever.   HENT:  Negative for congestion, postnasal drip, rhinorrhea, sneezing, sore throat and tinnitus.    Respiratory:  Negative for cough, shortness of breath and wheezing.    Cardiovascular:  Negative for chest pain and palpitations.   Gastrointestinal:  Negative for abdominal distention, abdominal pain, constipation, diarrhea, nausea and vomiting.   Musculoskeletal:  Negative for  arthralgias, back pain, joint swelling, myalgias and neck stiffness.   Skin:  Negative for rash and wound.   Neurological:  Negative for weakness and numbness.   Psychiatric/Behavioral:  Positive for decreased concentration, dysphoric mood and sleep disturbance. Negative for self-injury and suicidal ideas. The patient is nervous/anxious.      Current Outpatient Medications on File Prior to Visit   Medication Sig Dispense Refill    albuterol (PROVENTIL HFA,VENTOLIN HFA) 90 mcg/act inhaler Inhale 2 puffs every 6 (six) hours as needed for wheezing      aspirin 81 mg chewable tablet Chew 1 tablet (81 mg total) daily 30 tablet 0    atorvastatin (LIPITOR) 40 mg tablet TAKE 1 TABLET BY MOUTH DAILY WITH DINNER 30 tablet 0    busPIRone (BUSPAR) 5 mg tablet Take 1 tablet (5 mg total) by mouth 2 (two) times a day 60 tablet 5    fluocinolone acetonide (DermOtic) 0.01 % otic oil Administer 5 drops into both ears 2 (two) times a day 20 mL 0    lisinopril (ZESTRIL) 10 mg tablet Take 0.5 tablets (5 mg total) by mouth daily 30 tablet 1    methimazole (TAPAZOLE) 5 mg tablet Take 0.5 tablets (2.5 mg total) by mouth in the morning 30 tablet 1    nicotine polacrilex (NICORETTE) 2 mg gum CHEW 1 EACH (2 MG TOTAL) AS NEEDED FOR SMOKING CESSATION 30 each 0    timolol (TIMOPTIC) 0.5 % ophthalmic solution Apply 1 drop to eye daily 10 mL 0    verapamil (CALAN-SR) 120 mg CR tablet TAKE 1 TABLET (120 MG TOTAL) BY MOUTH DAILY AT BEDTIME 30 tablet 0     No current facility-administered medications on file prior to visit.      Objective     There were no vitals taken for this visit. - UNABLE SECONDARY TO NATURE OF VISIT     Physical Exam  Constitutional:       Appearance: Normal appearance.      Comments: Limited secondary to nature of visit   HENT:      Head: Normocephalic and atraumatic.      Right Ear: External ear normal.      Left Ear: External ear normal.      Mouth/Throat:      Pharynx: Oropharynx is clear.   Eyes:      Conjunctiva/sclera:  Conjunctivae normal.   Neurological:      Mental Status: She is alert.          Visit Time  Total Visit Duration: 15 minutes

## 2024-08-20 ENCOUNTER — PATIENT OUTREACH (OUTPATIENT)
Age: 49
End: 2024-08-20

## 2024-08-20 NOTE — PROGRESS NOTES
OP SW had received a referral from Giovanna Burkett, DO r/t housing instability. OP [facility] had completed a chart review. Per chart, patient has anxiety and depression due to being told to move out of her home within a week. Per chart, patient referred to [facility]. Per chart, [facility] emailed patient MH resource list and closed referral. OP [facility] notes [facility] does NOT accept [place] Medicaid at this time. OP SW will outreach patient to further assess needs.    OP SW had called the patient via phone. OP SW left a voicemail. OP SW will attempt to call again at a late date and time. OP SW will continue to be available.

## 2024-08-21 NOTE — ASSESSMENT & PLAN NOTE
Noting that symptoms include racing thoughts and difficulty sleeping.  Noting that she recently has had housing insecurity as she has had to move out of her old apartment and move in with her daughter.  She has been unable to find another apartment for herself and noting that it has been difficult to adjust to living with her daughter.  Patient was started on Paxil 10 mg QD and BuSpar 5 mg TID at last visit    KALYN 18 (at last visit)    Plan:  -Increase Paxil to 20 mg daily  -Continue BuSpar 5 mg 3 times daily PRN  -Recommend counseling, patient provided with list of resources

## 2024-08-21 NOTE — ASSESSMENT & PLAN NOTE
TSH was 0.29.  Free T4 0.85, free T3 1.2, TSI WNL, TPO WNL   ANCA WNL, ESR WNL, CRP WNL  Notch3 negative    Thyroid US: Enlarged gland with multiple nodules which do not technically meet current ACR criteria for follow-up ultrasound recommendation.     Denying any worsening symptoms with exception for anxiety (suspect secondary to life circumstances).     Plan:  -Continue follow-up with endocrinology as scheduled  -Continue methimazole

## 2024-08-21 NOTE — ASSESSMENT & PLAN NOTE
Chronic, stable since previous visit.  Noticing that she is still having difficulty with sleep initiation for the past several weeks, noting that her primary concern is currently racing thoughts.  Stating that she has been unable to do the sleep journal as she has not had access to the resources.  Suspect worsening insomnia secondary to anxiety    Plan:  -Consider melatonin   -Recommend sleep journal  -See rest of plan per  anxiety/depression

## 2024-08-26 ENCOUNTER — OFFICE VISIT (OUTPATIENT)
Dept: NEUROLOGY | Facility: CLINIC | Age: 49
End: 2024-08-26
Payer: COMMERCIAL

## 2024-08-26 VITALS
WEIGHT: 143 LBS | HEIGHT: 65 IN | OXYGEN SATURATION: 100 % | SYSTOLIC BLOOD PRESSURE: 124 MMHG | BODY MASS INDEX: 23.82 KG/M2 | TEMPERATURE: 97.2 F | HEART RATE: 89 BPM | DIASTOLIC BLOOD PRESSURE: 82 MMHG

## 2024-08-26 DIAGNOSIS — G93.9 WHITE MATTER LESION OF CENTRAL NERVOUS SYSTEM: ICD-10-CM

## 2024-08-26 DIAGNOSIS — G43.709 CHRONIC MIGRAINE WITHOUT AURA: ICD-10-CM

## 2024-08-26 DIAGNOSIS — I10 UNCONTROLLED HYPERTENSION: ICD-10-CM

## 2024-08-26 DIAGNOSIS — R29.90 STROKE-LIKE SYMPTOMS: ICD-10-CM

## 2024-08-26 DIAGNOSIS — I73.9 SMALL VESSEL DISEASE (HCC): Primary | ICD-10-CM

## 2024-08-26 PROCEDURE — 99215 OFFICE O/P EST HI 40 MIN: CPT | Performed by: PSYCHIATRY & NEUROLOGY

## 2024-08-26 RX ORDER — VERAPAMIL HYDROCHLORIDE 120 MG/1
120 TABLET, FILM COATED, EXTENDED RELEASE ORAL
Qty: 30 TABLET | Refills: 5 | Status: SHIPPED | OUTPATIENT
Start: 2024-08-26

## 2024-08-26 NOTE — PROGRESS NOTES
Return NeuroOutpatient Note        Cherie Arias  [id number]  49 y.o.  1975       History of migraines ; Hyperthyroidism; Perimenopausal symptoms ; and Left sided paresthesia, weakness          History obtained from:  Patient     HPI/Subjective:    Cherie Arias is a 50 yo F with PMH of HTN, migraines presents as f/u. She had presented to [facility] on May 8th, 2024 with vague symptoms of dizziness, lightheadedness, visual disturbance, left sided body numbness and shaking. She has h/o uncontrolled BP for a long time. Her MRI brain had revealed moderate scattered periventricular white matter hyperintensities within the cerebral hemispheres, somewhat atypical for a patient of this age. These were primarily peripheral in location affecting the frontal and parietal lobes sparing the basal ganglia and brainstem. The findings were though to be non specific and differential considerations would include precocious chronic microangiopathic change, subacute arteriosclerotic encephalopathy, CADASIL or sequela of chronic CNS vasculitis. Chronic infectious/inflammatory process including demyelinating disease, considered less likely given the pattern of distribution.    We had checked sed rate, crp, ACE, ANCA, lyme, vit D, HIV, tsh, hep C, anti tpo ab and other than vit D level of 20, all else were negative.  We had ordered outpatient lab for Notch 3 gene for CADASIL and it's negative.   Her A1c is wnl.  Her LDL was 76.   Her tsh is low at 0.33.     Her BP is better but not completely controlled.  She was placed on verapamil 120mg daily and since has been doing somewhat better.   She does report about 3 headaches a week on average.        Moderate paranasal sinus disease with no air-fluid levels.      Past Medical History:   Diagnosis Date   • Asthma    • Disease of thyroid gland    • Hypertension      Social History     Socioeconomic History   • Marital status: /Civil Union     Spouse name: Not on file   •  Number of children: Not on file   • Years of education: Not on file   • Highest education level: Not on file   Occupational History   • Not on file   Tobacco Use   • Smoking status: Every Day     Current packs/day: 0.50     Types: Cigarettes   • Smokeless tobacco: Never   Vaping Use   • Vaping status: Never Used   Substance and Sexual Activity   • Alcohol use: Yes     Comment: Every now and then   • Drug use: Not Currently     Types: Marijuana   • Sexual activity: Not on file   Other Topics Concern   • Not on file   Social History Narrative   • Not on file     Social Determinants of Health     Financial Resource Strain: Low Risk  (5/17/2024)    Overall Financial Resource Strain (CARDIA)    • Difficulty of Paying Living Expenses: Not very hard   Food Insecurity: No Food Insecurity (5/7/2024)    Hunger Vital Sign    • Worried About Running Out of Food in the Last Year: Never true    • Ran Out of Food in the Last Year: Never true   Transportation Needs: No Transportation Needs (5/7/2024)    PRAPARE - Transportation    • Lack of Transportation (Medical): No    • Lack of Transportation (Non-Medical): No   Physical Activity: Not on file   Stress: Not on file   Social Connections: Not on file   Intimate Partner Violence: Not on file   Housing Stability: Low Risk  (5/7/2024)    Housing Stability Vital Sign    • Unable to Pay for Housing in the Last Year: No    • Number of Times Moved in the Last Year: 1    • Homeless in the Last Year: No     Family History   Problem Relation Age of Onset   • Hypertension Mother    • Hypertension Father    • Stroke Father      Allergies   Allergen Reactions   • Shellfish-Derived Products - Food Allergy Hives     Current Outpatient Medications on File Prior to Visit   Medication Sig Dispense Refill   • albuterol (PROVENTIL HFA,VENTOLIN HFA) 90 mcg/act inhaler Inhale 2 puffs every 6 (six) hours as needed for wheezing     • aspirin 81 mg chewable tablet Chew 1 tablet (81 mg total) daily 30  "tablet 0   • atorvastatin (LIPITOR) 40 mg tablet TAKE 1 TABLET BY MOUTH DAILY WITH DINNER 30 tablet 0   • busPIRone (BUSPAR) 5 mg tablet Take 1 tablet (5 mg total) by mouth 2 (two) times a day 60 tablet 5   • fluocinolone acetonide (DermOtic) 0.01 % otic oil Administer 5 drops into both ears 2 (two) times a day 20 mL 0   • lisinopril (ZESTRIL) 10 mg tablet Take 0.5 tablets (5 mg total) by mouth daily 30 tablet 1   • methimazole (TAPAZOLE) 5 mg tablet Take 0.5 tablets (2.5 mg total) by mouth in the morning 30 tablet 1   • nicotine polacrilex (NICORETTE) 2 mg gum CHEW 1 EACH (2 MG TOTAL) AS NEEDED FOR SMOKING CESSATION 30 each 0   • PARoxetine (PAXIL) 10 mg tablet Take 2 tablets (20 mg total) by mouth daily 30 tablet 1   • timolol (TIMOPTIC) 0.5 % ophthalmic solution Apply 1 drop to eye daily 10 mL 0   • [DISCONTINUED] verapamil (CALAN-SR) 120 mg CR tablet TAKE 1 TABLET (120 MG TOTAL) BY MOUTH DAILY AT BEDTIME 30 tablet 0   • Cholecalciferol (VITAMIN D3) 1,000 units tablet Take 1 tablet (1,000 Units total) by mouth daily (Patient not taking: Reported on 8/26/2024) 30 tablet 1     No current facility-administered medications on file prior to visit.         Review of Systems   Refer to positive review of systems in HPI.   Review of Systems    Constitutional- No fever  Eyes- No visual change  ENT- Hearing normal  CV- No chest pain  Resp- No Shortness of breath  GI- No diarrhea  - Bladder normal  MS- No Arthritis   Skin- No rash  Psych- No depression  Endo- No DM  Heme- No nodes    Vitals:    08/26/24 1513   BP: 124/82   BP Location: Left arm   Patient Position: Sitting   Cuff Size: Standard   Pulse: 89   Temp: (!) 97.2 °F (36.2 °C)   TempSrc: Tympanic   SpO2: 100%   Weight: 64.9 kg (143 lb)   Height: 5' 5\" (1.651 m)       PHYSICAL EXAM:  Appearance: No Acute Distress  Ophthalmoscopic: Disc Flat, Normal fundus  Mental status:  Orientation: Awake, Alert, and Orientedx3  Memory: Registation 3/3 Recall 3/3  Attention: " normal  Knowledge: good  Language: No aphasia  Speech: No dysarthria  Cranial Nerves:  2 No Visual Defect on Confrontation, Pupils round, equal, reactive to light  3,4,6 Extraocular Movements Intact, no nystagmus  5 Facial Sensation Intact  7 No facial asymmetry  8 Intact hearing  9,10 Palate symmetric, normal gag  11 Good shoulder shrug  12 Tongue Midline  Gait: Stable  Coordination: No ataxia with finger to nose testing, and heel to shin  Sensory: Intact, Symmetric to pinprick, light touch, vibration, and joint position  Muscle Tone: Normal              Muscle exam:  Arm Right Left Leg Right Left   Deltoid 5/5 5/5 Iliopsoas 5/5 5/5   Biceps 5/5 5/5 Quads 5/5 5/5   Triceps 5/5 5/5 Hamstrings 5/5 5/5   Wrist Extension 5/5 5/5 Ankle Dorsi Flexion 5/5 5/5   Wrist Flexion 5/5 5/5 Ankle Plantar Flexion 5/5 5/5   Interossei 5/5 5/5 Ankle Eversion 5/5 5/5   APB 5/5 5/5 Ankle Inversion 5/5 5/5       Reflexes   RJ BJ TJ KJ AJ Plantars Garg's   Right 2+ 2+ 2+ 2+ 2+ Downgoing Not present   Left 2+ 2+ 2+ 2+ 2+ Downgoing Not present     Personal review of  Labs:                    Diagnoses and all orders for this visit:        1. Small vessel disease (HCC)        2. 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 Chronic migraine without aura  Erenumab-aooe 140 MG/ML SOAJ      4. Uncontrolled hypertension        5. Stroke-like symptoms  verapamil (CALAN-SR) 120 mg CR tablet          Patient is negative for notch 3 gene for CADASIL.   All of her inflammatory markers have returned negative.  Her MRI brain wasn't impressive for demyelinating lesions.   Will repeat MRI brain in few months.   Will treat her for headaches with monthly Aimovig.   We can't use topamax as she has potential for glaucoma.  We can't use elavil as it can elevated BP.               Total time of encounter:  45 min  More than 50% of the time was used in counseling and/or coordination of care  Extent of counseling and/or coordination of  care        Carlie Austin MD  [facility]  [address] [place] [id number] [phone]

## 2024-08-28 ENCOUNTER — CONSULT (OUTPATIENT)
Dept: PAIN MEDICINE | Facility: CLINIC | Age: 49
End: 2024-08-28
Payer: COMMERCIAL

## 2024-08-28 ENCOUNTER — APPOINTMENT (OUTPATIENT)
Dept: RADIOLOGY | Facility: CLINIC | Age: 49
End: 2024-08-28
Payer: COMMERCIAL

## 2024-08-28 ENCOUNTER — PATIENT OUTREACH (OUTPATIENT)
Age: 49
End: 2024-08-28

## 2024-08-28 VITALS
SYSTOLIC BLOOD PRESSURE: 128 MMHG | WEIGHT: 144 LBS | HEIGHT: 65 IN | OXYGEN SATURATION: 99 % | DIASTOLIC BLOOD PRESSURE: 79 MMHG | HEART RATE: 88 BPM | BODY MASS INDEX: 23.99 KG/M2

## 2024-08-28 DIAGNOSIS — M54.50 CHRONIC LEFT-SIDED LOW BACK PAIN WITHOUT SCIATICA: ICD-10-CM

## 2024-08-28 DIAGNOSIS — M70.62 GREATER TROCHANTERIC BURSITIS OF LEFT HIP: ICD-10-CM

## 2024-08-28 DIAGNOSIS — G89.29 CHRONIC LEFT-SIDED LOW BACK PAIN WITHOUT SCIATICA: ICD-10-CM

## 2024-08-28 DIAGNOSIS — M25.552 LEFT HIP PAIN: ICD-10-CM

## 2024-08-28 DIAGNOSIS — M54.12 CERVICAL RADICULOPATHY: ICD-10-CM

## 2024-08-28 DIAGNOSIS — Z59.819 HOUSING INSECURITY: Primary | ICD-10-CM

## 2024-08-28 DIAGNOSIS — M54.12 CERVICAL RADICULOPATHY: Primary | ICD-10-CM

## 2024-08-28 PROCEDURE — 99204 OFFICE O/P NEW MOD 45 MIN: CPT | Performed by: STUDENT IN AN ORGANIZED HEALTH CARE EDUCATION/TRAINING PROGRAM

## 2024-08-28 PROCEDURE — 72040 X-RAY EXAM NECK SPINE 2-3 VW: CPT

## 2024-08-28 PROCEDURE — 72100 X-RAY EXAM L-S SPINE 2/3 VWS: CPT

## 2024-08-28 SDOH — ECONOMIC STABILITY - HOUSING INSECURITY: HOUSING INSTABILITY UNSPECIFIED: Z59.819

## 2024-08-28 NOTE — PROGRESS NOTES
KING FRIEND had called the patient via phone. OP SW had introduced herself and reason for consult. OP SW asked the patient how she was doing. Patient reported she was doing well.    Patient reported she has been staying with her daughter. Patient reported property was sold back on 7/31 so could only go to daughter's home. Patient stated she is [place] [address] and needs to find a place. Patient stated she has spoken to three realtors but they tell her that credit is not enough. Patient stated she does not know what else to do.    Patient stated she has three older sons and a younger daughter age 14 that need to move with her. 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 Patient stated her sons work so enough money in the home.     OP SW informed the patient that we do no help with finding an apartment or house. OP SW informed the patient that she can have her CMOC Sonia [place] try to assist with connecting her to an agency for further guidance. OP PHUC reviewed CMOC role. Patient understood. OP PHUC placed referral.    Patient reported she has SNAP benefits. Patient reported enough food in the home. Patient did not report any transportation issues at this time. Per chart, patient has [employer] for coverage. Patient did not report any issues with medication cost at this time.    OP SW asked patient if she was able to connect to MH services. Patient told KING FRIEND that she has not been able to do so. Patient reported has been  from partner for over a month. Patient reported she is working on legal separation. KING FRIEND offered to email patient MH resource list as well as information on the [facility]. Patient agreed and confirmed email on file [email or web address]. Patient denied any other needs at this time.     KING FRIEND had provided her contact information. OP PHUC advised patient reach out as needed. Patient agreed. Patient consented to continued SW outreach at this  time. OP SW added patient to reported under socially complex. OP SW emailed the patient as discussed. OP SW routed note to oSnia. OP SW will continue to f/u.

## 2024-08-28 NOTE — PROGRESS NOTES
Assessment:  1. Cervical radiculopathy    2. Greater trochanteric bursitis of left hip    3. Left hip pain    4. Chronic left-sided low back pain without sciatica      Patient is a pleasant 49-year-old woman who presents with 8 years of neck pain with right-sided radicular symptoms along with left hip pain.  Patient referred by orthopedics for concern for left greater trochanter bursitis.  Over the past month the intense pains been moderate to severe and she rates pain currently is 8 out of 10 on numeric rating scale.  The pain occurs nearly constantly and is no Pateman symptoms are better or worse.  She describes pain as cramping, numbing, sharp, pins-and-needles, throbbing, dull and aching some associated weakness in upper extremities.  She does not use any assistive devices.  Activities increase her pain include prayer, lying down, standing, bending, sitting, walking, exercise, relaxation, pop, sneezing.  Patient not have any recent imaging of her Lumbar or cervical spine.  Patient does have x-ray of her hip from February 2024 which was unremarkable.  Patient had no prior surgeries or injection therapy.  Of note patient does have small vessel vascular disease recently seen by neurology with concern that her symptoms were due to uncontrolled blood pressure.  Workup was for the most part unremarkable.  Past medical history seen for anxiety, asthma, depression, reflux, high cholesterol, thyroid disease, arthritis in the shoulder and hip.  Prior pain treatments, ice provided no relief.  Patient does smoke tobacco half pack a day and marijuana socially.  She currently uses Tylenol and ibuprofen.  Also uses Voltaren gel and naproxen.  In the past she tried Flexeril.  She also tried gabapentin in the past with no benefit.    Patient complaining today of of symptoms consistent with cervical radiculopathy along with greater trochanter bursitis of the hip.  Patient not interested in any injection therapy so will not pursue  this any further.  Will further evaluate her cervical radiculopathy complaints of an x-ray of the cervical spine and also get an x-ray of the lumbar spine.  Symptomatically patient counseled continue with over-the-counter anti-inflammatories as she is doing along with the topical cream that she wants.  Plan:  Start PT as ordered by orthopedics  Continue topical anti-inflammatories   Orders Placed This Encounter   Procedures   • XR spine cervical 2 or 3 vw injury     Standing Status:   Future     Number of Occurrences:   1     Standing Expiration Date:   8/28/2028     Scheduling Instructions:      Bring along any outside films relating to this procedure.           Order Specific Question:   Is the patient pregnant?     Answer:   No   • XR spine lumbar 2 or 3 views injury     Standing Status:   Future     Number of Occurrences:   1     Standing Expiration Date:   8/28/2028     Scheduling Instructions:      Bring along any outside films relating to this procedure.           Order Specific Question:   Is the patient pregnant?     Answer:   No       No orders of the defined types were placed in this encounter.      My impressions and treatment recommendations were discussed in detail with the patient, who verbalized understanding and had no further questions.      Follow-up is planned in four weeks time or sooner as warranted.  Discharge instructions were provided. I personally saw and examined the patient and I agree with the above discussed plan of care.    History of Present Illness:    Cherie Arias is a 49 y.o. female who presents to [facility] for initial evaluation of the above stated pain complaints. The patient has a past medical and chronic pain history as outlined in the assessment section. She was referred by Melquiades Slade, DO  [address], [address]  [place] [address].    Patient is a pleasant 49-year-old woman who presents with 8 years of neck  pain with right-sided radicular symptoms along with left hip pain.  Patient referred by orthopedics for concern for left greater trochanter bursitis.  Over the past month the intense pains been moderate to severe and she rates pain currently is 8 out of 10 on numeric rating scale.  The pain occurs nearly constantly and is no Pateman symptoms are better or worse.  She describes pain as cramping, numbing, sharp, pins-and-needles, throbbing, dull and aching some associated weakness in upper extremities.  She does not use any assistive devices.  Activities increase her pain include prayer, lying down, standing, bending, sitting, walking, exercise, relaxation, pop, sneezing.  Patient not have any recent imaging of her Lumbar or cervical spine.  Patient does have x-ray of her hip from February 2024 which was unremarkable.  Patient had no prior surgeries or injection therapy.  Of note patient does have small vessel vascular disease recently seen by neurology with concern that her symptoms were due to uncontrolled blood pressure.  Workup was for the most part unremarkable.  Past medical history seen for anxiety, asthma, depression, reflux, high cholesterol, thyroid disease, arthritis in the shoulder and hip.  Prior pain treatments, ice provided no relief.  Patient does smoke tobacco half pack a day and marijuana socially.  She currently uses Tylenol and ibuprofen.  Also uses Voltaren gel and naproxen.  In the past she tried Flexeril.  She also tried gabapentin in the past with no benefit.    Review of Systems:    Review of Systems   Constitutional:  Negative for chills and fatigue.   HENT:  Negative for ear pain, mouth sores and sinus pressure.    Eyes:  Positive for visual disturbance. Negative for pain and redness.   Respiratory:  Positive for cough. Negative for shortness of breath and wheezing.    Cardiovascular:  Positive for chest pain. Negative for palpitations.   Gastrointestinal:  Negative for abdominal pain and  nausea.   Endocrine: Negative for polyphagia.   Musculoskeletal:  Positive for back pain, gait problem, neck pain and neck stiffness. Negative for arthralgias.        Decreased ROM, joint and muscle pain   Skin:  Negative for wound.   Neurological:  Positive for dizziness, weakness, light-headedness, numbness and headaches. Negative for seizures.   Psychiatric/Behavioral:  Positive for decreased concentration, dysphoric mood and sleep disturbance. The patient is nervous/anxious.            Past Medical History:   Diagnosis Date   • Asthma    • Disease of thyroid gland    • Hypertension        Past Surgical History:   Procedure Laterality Date   • TUBAL LIGATION         Family History   Problem Relation Age of Onset   • Hypertension Mother    • Hypertension Father    • Stroke Father        Social History     Occupational History   • Not on file   Tobacco Use   • Smoking status: Every Day     Current packs/day: 0.50     Types: Cigarettes   • Smokeless tobacco: Never   Vaping Use   • Vaping status: Never Used   Substance and Sexual Activity   • Alcohol use: Yes     Comment: Every now and then   • Drug use: Not Currently     Types: Marijuana   • Sexual activity: Not on file         Current Outpatient Medications:   •  albuterol (PROVENTIL HFA,VENTOLIN HFA) 90 mcg/act inhaler, Inhale 2 puffs every 6 (six) hours as needed for wheezing, Disp: , Rfl:   •  aspirin 81 mg chewable tablet, Chew 1 tablet (81 mg total) daily, Disp: 30 tablet, Rfl: 0  •  atorvastatin (LIPITOR) 40 mg tablet, TAKE 1 TABLET BY MOUTH DAILY WITH DINNER, Disp: 30 tablet, Rfl: 0  •  busPIRone (BUSPAR) 5 mg tablet, Take 1 tablet (5 mg total) by mouth 2 (two) times a day, Disp: 60 tablet, Rfl: 5  •  Cholecalciferol (VITAMIN D3) 1,000 units tablet, Take 1 tablet (1,000 Units total) by mouth daily, Disp: 30 tablet, Rfl: 1  •  Erenumab-aooe 140 MG/ML SOAJ, Inject 140 mg under the skin every 30 (thirty) days, Disp: 1 mL, Rfl: 3  •  fluocinolone acetonide  "(DermOtic) 0.01 % otic oil, Administer 5 drops into both ears 2 (two) times a day, Disp: 20 mL, Rfl: 0  •  lisinopril (ZESTRIL) 10 mg tablet, Take 0.5 tablets (5 mg total) by mouth daily, Disp: 30 tablet, Rfl: 1  •  methimazole (TAPAZOLE) 5 mg tablet, Take 0.5 tablets (2.5 mg total) by mouth in the morning, Disp: 30 tablet, Rfl: 1  •  nicotine polacrilex (NICORETTE) 2 mg gum, CHEW 1 EACH (2 MG TOTAL) AS NEEDED FOR SMOKING CESSATION, Disp: 30 each, Rfl: 0  •  PARoxetine (PAXIL) 10 mg tablet, Take 2 tablets (20 mg total) by mouth daily, Disp: 30 tablet, Rfl: 1  •  timolol (TIMOPTIC) 0.5 % ophthalmic solution, Apply 1 drop to eye daily, Disp: 10 mL, Rfl: 0  •  verapamil (CALAN-SR) 120 mg CR tablet, Take 1 tablet (120 mg total) by mouth daily at bedtime, Disp: 30 tablet, Rfl: 5    Allergies   Allergen Reactions   • Shellfish-Derived Products - Food Allergy Hives       Physical Exam:    /79   Pulse 88   Ht 5' 4.5\" (1.638 m)   Wt 65.3 kg (144 lb)   SpO2 99%   BMI 24.34 kg/m²     Constitutional: normal, well developed, well nourished, alert, in no distress and non-toxic and no overt pain behavior.  Eyes: anicteric  HEENT: grossly intact  Neck: supple, symmetric, trachea midline and no masses   Pulmonary:even and unlabored  Cardiovascular:No edema or pitting edema present  Skin:Normal without rashes or lesions and well hydrated  Psychiatric:Mood and affect appropriate  Neurologic:Cranial Nerves II-XII grossly intact  Musculoskeletal:normal gait. TTP in bilateral cervical paraspinal muscles. Pain with passive range of motion of the left arm. Normal abduction and adduction of left shoulder, but pain.     Imaging  LEFT SHOULDER     INDICATION:   Pain in left shoulder. Other chronic pain.      COMPARISON:  None.     VIEWS:  XR SHOULDER 2+ VW LEFT      FINDINGS:     There is no acute fracture or dislocation.     Mild osteoarthritis acromioclavicular joint.     No lytic or blastic osseous lesion.     Soft tissues are " unremarkable.     IMPRESSION:        No acute osseous abnormality.  No orders to display       Orders Placed This Encounter   Procedures   • XR spine cervical 2 or 3 vw injury   • XR spine lumbar 2 or 3 views injury

## 2024-08-29 DIAGNOSIS — R29.90 STROKE-LIKE SYMPTOMS: ICD-10-CM

## 2024-08-29 RX ORDER — ATORVASTATIN CALCIUM 40 MG/1
40 TABLET, FILM COATED ORAL
Qty: 30 TABLET | Refills: 0 | Status: SHIPPED | OUTPATIENT
Start: 2024-08-29

## 2024-08-30 ENCOUNTER — TELEPHONE (OUTPATIENT)
Dept: VASCULAR SURGERY | Facility: CLINIC | Age: 49
End: 2024-08-30

## 2024-08-30 ENCOUNTER — OFFICE VISIT (OUTPATIENT)
Dept: VASCULAR SURGERY | Facility: CLINIC | Age: 49
End: 2024-08-30
Payer: COMMERCIAL

## 2024-08-30 VITALS
OXYGEN SATURATION: 99 % | WEIGHT: 142.7 LBS | DIASTOLIC BLOOD PRESSURE: 72 MMHG | SYSTOLIC BLOOD PRESSURE: 122 MMHG | HEIGHT: 65 IN | HEART RATE: 75 BPM | RESPIRATION RATE: 20 BRPM | BODY MASS INDEX: 23.78 KG/M2

## 2024-08-30 DIAGNOSIS — I83.813 VARICOSE VEINS OF BILATERAL LOWER EXTREMITIES WITH PAIN: Primary | ICD-10-CM

## 2024-08-30 PROCEDURE — 99215 OFFICE O/P EST HI 40 MIN: CPT | Performed by: SURGERY

## 2024-08-30 RX ORDER — CEFAZOLIN SODIUM 2 G/50ML
2000 SOLUTION INTRAVENOUS ONCE
OUTPATIENT
Start: 2024-08-30 | End: 2024-08-30

## 2024-08-30 RX ORDER — CHLORHEXIDINE GLUCONATE ORAL RINSE 1.2 MG/ML
15 SOLUTION DENTAL ONCE
OUTPATIENT
Start: 2024-08-30 | End: 2024-08-30

## 2024-08-30 NOTE — TELEPHONE ENCOUNTER
REMINDER: Under Reason For Call, comments MUST be formatted as:   (Surgeon's Initials) / (Procedure)      Special Instructions / FYI: [facility], level 4, and no clearance.    Consent: I certify that patient has signed, printed, timed, and dated their surgery consent.  I certify that the patient's LEGAL NAME and DATE OF BIRTH are written in the upper left corner on BOTH sides of the consent.  I certify that BOTH sides of the completed surgery consent have been scanned into the patient's Epic chart by myself on 8/30/2024.  Yes, I have LABELED the consent in Epic as Consent for Vascular Procedure.     For Surgical Clearances     Levels   1-3   ROUTE this encounter to [facility] Clearance Pool (AND)   [facility] Surgery Coordinator Pool     Level   4   ROUTE this encounter to [facility] Surgery Coordinator Pool       HYDRATION CLEARANCES   ONLY ROUTE TO  [facility] Clearance Pool       Yes, I have ROUTED this encounter to [facility] Surgery Coordinator and/or [facility] Clearance Pool.

## 2024-08-30 NOTE — PROGRESS NOTES
Assessment/Plan:      Diagnoses and all orders for this visit:    Varicose veins of bilateral lower extremities with pain  -     Case request operating room: Left GSV EVLT and left leg phlebectomies; Standing  -     CBC and differential; Future  -     CBC and differential  -     Case request operating room: Left GSV EVLT and left leg phlebectomies    Other orders  -     Diet NPO; Sips with meds; Standing  -     Void on call to OR; Standing  -     Insert peripheral IV; Standing  -     Nursing Communication CHG bath, have staff wash entire body (neck down) per pre op bathing protocol. Routine, evening prior to, and day of surgery.; Standing  -     Nursing Communication Swab both nares with Povidone-Iodine solution, EXCLUDE if patient has shellfish/Iodine allergy, and replace with nasal alcohol swabstick. Routine, day of surgery, on call to OR.; Standing  -     chlorhexidine (PERIDEX) 0.12 % oral rinse 15 mL  -     Place sequential compression device; Standing  -     Basic metabolic panel; Standing  -     ceFAZolin (ANCEF) IVPB (premix in dextrose) 2,000 mg 50 mL        Varicose veins of bilateral lower extremities with pain  49-year-old female presents for varicose veins.  She saw her vascular AP in the office several months ago with symptomatic bilateral lower extremity varicose veins the left leg is more symptomatic than the right she is having pain and throbbing worse at the end of the day.  Her varicose veins are located on the posterior calf on both the right and the left leg.  She was given a trial of compression stockings as well as an order for a lower extremity venous reflux duplex she has been compliant with the stockings and has had only partial relief of her symptoms.  Her reflux study was done bilaterally it shows deep and superficial reflux and no DVT on the right and left side.  I discussed the findings of her ultrasound with her as well as treatment options for her varicosities and I explained to her  that I do think we can improve her symptoms with left great saphenous vein EVLT and limited phlebectomies after discussing all risks benefits and alternative therapies with her in detail she consented to proceed.  She would need very few phlebectomies probably between 5 and 10 on the posterior left calf and I also discussed with her that if she has symptomatic improvement in the left leg we can stage her right leg.       Subjective:     Patient ID: Cherie Arias is a 49 y.o. female.    Patient had a LEVDR on 7/23/24. Pt c.o BLE tiredness and heaviness. Lt leg > Rt leg. Pt does wear compression stockings 3 times a week.     HPI    Review of Systems   Constitutional: Negative.    HENT: Negative.     Eyes: Negative.    Respiratory: Negative.     Cardiovascular:  Positive for leg swelling.        Painful veins   Gastrointestinal: Negative.    Endocrine: Negative.    Genitourinary: Negative.    Musculoskeletal: Negative.    Skin: Negative.    Allergic/Immunologic: Negative.    Neurological:  Positive for headaches.   Hematological: Negative.    Psychiatric/Behavioral: Negative.         I have reviewed the ROS above and made changes as needed.      Objective:     Physical Exam      General  Exam: alert, awake, oriented, no distress, consistent with stated age    Integumentary  Exam: warm, dry, no gross lesions, no bruises and normal color    Head and Neck  Exam: supple, no bruits, trachea midline, no JVD, no mass or palpable nodes    Chest and Lung  Exam: chest normal without deformity, bilaterally expansive, clear to auscultation    Cardiovascular  Exam: regular rate, regular rhythm, no murmurs, no rubs or gallops    Adbomen  Exam: soft, non-tender, non-distended, no pulsatile abdominal masses, no abdominal bruit    Peripheral Vascular  Exam: no clubbing of the digits of the upper extremity, no cyanosis, no edema, both feet are warm, radial pulses 2+ bilaterally, skin well perfused  No widened popliteal pulse noted  bilaterally    Bilateral posterior calf varicose veins    Upper Extremity:  Palpation: Radial pulse- Bilateral 2+    Lower Extremity:  Palpation: Femoral pulse- Bilateral 2+         Popliteal pulse - Bilateral 2+        Dorsalis Pedis - Bilateral 2+        Neurologic  Exam:alert, non-focal, oriented x 3, cranial nerves II-XII grossly intact      EVLT Operative Scheduling Information:    Hospital:  [facility]    Physician:  Jacky    Surgery: Left GSV EVLT and left leg phlebectomies    Urgency:  Standard    Level:  Level 4: Outpatients to be scheduled for screening procedures and elective surgery that can be delayed for longer than one month without reasonable expectation of detriment to patient.    Case Length:  Normal    Post-op Bed:  Outpatient    OR Table:  Standard    Equipment Needs:  None    Medication Instructions:  None    Hydration:  No    Contrast Allergy:  No    Venous Clinical Severity Scores (VCSS)  Item Absent   (0 points) Mild   (1 point) Moderate   (2 points) Severe   (3 points)   Pain [] None [] Occasional [] Daily [x] Daily limiting   Varicose veins [] None [] Few [x] Calf or thigh [] Calf and thigh   Venous edema [] None [x] Foot and ankle [] Above ankle, below knee [] To knee of above   Skin pigmentation [] None [x] Perimalleolar [] Diffuse, lower 1/3 calf [] Wider, above lower 1/3 calf   Inflammation [] None [x] Perimalleolar [] Diffuse, lower 1/3 calf [] Wider, above lower 1/3 calf   Induration [] None [x] Perimalleolar [] Diffuse, lower 1/3 calf [] Wider, above lower 1/3 calf   No. active ulcers [x] None [] 1 [] 2 [] ?3   Active ulcer size [x] None [] <2 cm [] 2 - 6 cm [] >6 cm   Ulcer duration [x] None [] <3 months [] 3 - 12 months [] >1 year   Compression therapy [] None [] Intermittent [] Most days [x] Fully comply   Total 12          CEAP Clinical Classification  [x] Symptomatic   [] Asymptomatic     [] Class 0 No visible or palpable signs of venous disease   [] Class 1 Telangiectasies or  reticular veins   [x] Class 2 Varicose veins; distinguished from reticular veins by a diameter of 3mm or more   [] Class 3 Edema   [] Class 4 Changes in skin and subcutaneous tissue secondary to CVD    [] Class 4a Pigmentation or eczema   [] Class 4b Lipodermatosclerosis or atrophie lucy   [] Class 5 Healed venous ulcer   [] Class 6 Active venous ulcer       RLE CEAP 2  LLE CEAP 3

## 2024-08-30 NOTE — ASSESSMENT & PLAN NOTE
49-year-old female presents for varicose veins.  She saw her vascular AP in the office several months ago with symptomatic bilateral lower extremity varicose veins the left leg is more symptomatic than the right she is having pain and throbbing worse at the end of the day.  Her varicose veins are located on the posterior calf on both the right and the left leg.  She was given a trial of compression stockings as well as an order for a lower extremity venous reflux duplex she has been compliant with the stockings and has had only partial relief of her symptoms.  Her reflux study was done bilaterally it shows deep and superficial reflux and no DVT on the right and left side.  I discussed the findings of her ultrasound with her as well as treatment options for her varicosities and I explained to her that I do think we can improve her symptoms with left great saphenous vein EVLT and limited phlebectomies after discussing all risks benefits and alternative therapies with her in detail she consented to proceed.  She would need very few phlebectomies probably between 5 and 10 on the posterior left calf and I also discussed with her that if she has symptomatic improvement in the left leg we can stage her right leg.

## 2024-09-03 ENCOUNTER — TELEPHONE (OUTPATIENT)
Dept: PAIN MEDICINE | Facility: CLINIC | Age: 49
End: 2024-09-03

## 2024-09-03 NOTE — TELEPHONE ENCOUNTER
Zachery Uriarte MD  [facility]  Patient with cervical spondylosis at multiple levels with fusion at C2-C3 and C3-C4 that is unchanged. No change in management at this time.   independent

## 2024-09-04 DIAGNOSIS — E05.90 HYPERTHYROIDISM: ICD-10-CM

## 2024-09-04 NOTE — TELEPHONE ENCOUNTER
EVLT Operative Scheduling Information:     Hospital:  [facility]     Physician:  Jacky     Surgery: Left GSV EVLT and left leg phlebectomies     Urgency:  Standard     Level:  Level 4: Outpatients to be scheduled for screening procedures and elective surgery that can be delayed for longer than one month without reasonable expectation of detriment to patient.     Case Length:  Normal     Post-op Bed:  Outpatient     OR Table:  Standard     Equipment Needs:  None     Medication Instructions:  None     Hydration:  No     Contrast Allergy:  No

## 2024-09-05 ENCOUNTER — PATIENT OUTREACH (OUTPATIENT)
Age: 49
End: 2024-09-05

## 2024-09-05 RX ORDER — METHIMAZOLE 5 MG/1
2.5 TABLET ORAL DAILY
Qty: 30 TABLET | Refills: 1 | Status: SHIPPED | OUTPATIENT
Start: 2024-09-05

## 2024-09-05 NOTE — PROGRESS NOTES
CMOC received referral for patient with housing insecurity.     CMOC performed chart review.     CMOC placed a call to patient. CMOC introduced herself, her role and reason for the call. Patient was aware of referral and accepted the assistance of CMOC.     Patient stated that she had to move out of her home due to it being sold. Patient stated she has a [facility] [employer] voucher, but has to find a place to live. Patient accepted information regarding [facility], patient will call to discuss if they have any assistance to provide regarding house search.     CMOC assessment completed.    CMOC discussed Find Help to patient. CMOC explained the platform. Patient stated she would appreciate an email to connect to it.     Patient was referred on Findhelp to [facility] (program) for housing    CMOC was added to the patient care team and will follow up. Patient stated she prefers an afternoon timeframe.

## 2024-09-06 ENCOUNTER — TELEPHONE (OUTPATIENT)
Dept: ENDOCRINOLOGY | Facility: CLINIC | Age: 49
End: 2024-09-06

## 2024-09-06 NOTE — TELEPHONE ENCOUNTER
Called patient and offered NP spot today with Dr. Hopper. Patient will call us back if she can come in. Patient understands it is first come first serve basis due to adding the day.

## 2024-09-09 ENCOUNTER — PREP FOR PROCEDURE (OUTPATIENT)
Dept: VASCULAR SURGERY | Facility: CLINIC | Age: 49
End: 2024-09-09

## 2024-09-09 DIAGNOSIS — I83.813 VARICOSE VEINS OF BILATERAL LOWER EXTREMITIES WITH PAIN: Primary | ICD-10-CM

## 2024-09-09 DIAGNOSIS — I83.819 VARICOSE VEINS OF UNSPECIFIED LOWER EXTREMITY WITH PAIN: ICD-10-CM

## 2024-09-10 ENCOUNTER — OFFICE VISIT (OUTPATIENT)
Dept: ENDOCRINOLOGY | Facility: CLINIC | Age: 49
End: 2024-09-10
Payer: COMMERCIAL

## 2024-09-10 VITALS
HEART RATE: 80 BPM | OXYGEN SATURATION: 97 % | HEIGHT: 65 IN | WEIGHT: 147 LBS | SYSTOLIC BLOOD PRESSURE: 120 MMHG | DIASTOLIC BLOOD PRESSURE: 80 MMHG | BODY MASS INDEX: 24.49 KG/M2

## 2024-09-10 DIAGNOSIS — R20.0 NUMBNESS AND TINGLING: ICD-10-CM

## 2024-09-10 DIAGNOSIS — Z13.1 SCREENING FOR DIABETES MELLITUS: ICD-10-CM

## 2024-09-10 DIAGNOSIS — E04.2 MULTIPLE THYROID NODULES: ICD-10-CM

## 2024-09-10 DIAGNOSIS — R63.1 INCREASED THIRST: ICD-10-CM

## 2024-09-10 DIAGNOSIS — E05.90 SUBCLINICAL HYPERTHYROIDISM: Primary | ICD-10-CM

## 2024-09-10 DIAGNOSIS — H53.9 VISION CHANGES: ICD-10-CM

## 2024-09-10 DIAGNOSIS — R20.2 NUMBNESS AND TINGLING: ICD-10-CM

## 2024-09-10 PROCEDURE — 99214 OFFICE O/P EST MOD 30 MIN: CPT | Performed by: INTERNAL MEDICINE

## 2024-09-10 NOTE — PROGRESS NOTES
Cherie Arias 49 y.o. female MRN: [id number]    Encounter: [id number]      Assessment & Plan     Subclinical hyperthyroidism (TSI, TPO, Tg Ab negative)  Goiter, multiple thyroid nodules  Symptomatically feels better however still has some symptoms-tremors that could be associated with hyperthyroidism.  Patient would like to proceed with a thyroid uptake and scan.  Reviewed ultrasound report, images with the patient    Continue methimazole at current does   - check thyroid function tests, CMP  -Thyroid uptake and scan ordered.  Advised patient not to take methimazole for at least 4 to 5 days prior to the scan  - Repeat ultrasound to assess interval change in thyroid nodules 8/2025    --Reviewed pathophysiology of hyperthyroidism, reviewing the hypothalamic-ptiuitary-thyroid axis and interpretation and significance of TSH, FT4, FT3 values.  --Discussed the use of GREEN and scan to evaluate cause of hyperthyroidism.  --Discussed possible etiologies of hyperthyroidism  --Discussed possible treatments along with potential side effects, based on etiology: anithyroid drugs, GREEN ablation, monitoring (if thyroiditis) and surgery (rarely indicated).     3. vision [id number] with ophthalmology  5. Screening for diabetes mellitus -per patient request, check A1c  6 numbness, tingling, brain fog-check vitamin B12    CC: hyperthyroidism     History of Present Illness     HPI:  Cherie Arias is a 49 y.o. female who is here for a hospital follow-up regarding hypothyroidism, goiter.     Was initially seen in the hospital as a new consult in 5/2024 when she had presented with left-sided numbness, dizziness, difficulty in walking.  Patient was initially diagnosed with hyperthyroidism at the age of 21, was on medications for a few years and then discontinued.  Had recurrence of hyperthyroidism approximately 8 years ago, was started on methimazole which she had run out on the beginning of the year.    Stroke like symptoms  have improved, following up with neurology. Has small vessel disease; will be starting monthly medication for migraines and repeat MRI in a few months.     Subclinical hyperthyroidism - restarted on methimazole 2.5 mg in 5/2024  C/o fatigue   No change in appetite  Gained 15 lbs since the hospital; feels current weight is baseline/normal for her   Occasional shakes/ palpitations  No diarrhea/ constipation   C/o blurriness in vision , feels it is worsening. No double vision/ pain/ swelling. Does feel like there is a discharge from the eyes/ is using eye drops for increased intraocular pressure.  Last met the eye doctor a few months ago    Sleep is variable   Has some anxiety   Brittle nails, no hair/ skin changes  Menstrual cycles:  LMP 2/2024  C/o brain fog, numbness and tingling in the hands    No swelling in the neck   No obstructive symptoms    Noticed increased thirst, few weeks ago, denies any urinary complaints.  Would like to be tested for diabetes mellitus.  A1c checked in 5/2024 was within normal limits      All other systems were reviewed and are negative.       Review of Systems    Historical Information   Past Medical History:   Diagnosis Date    Asthma     Disease of thyroid gland     Hypertension      Past Surgical History:   Procedure Laterality Date    TUBAL LIGATION       Social History   Social History     Substance and Sexual Activity   Alcohol Use Yes    Comment: Every now and then     Social History     Substance and Sexual Activity   Drug Use Not Currently    Types: Marijuana     Social History     Tobacco Use   Smoking Status Every Day    Current packs/day: 0.50    Types: Cigarettes   Smokeless Tobacco Never     Family History:   Family History   Problem Relation Age of Onset    Colon cancer Mother     Thyroid disease unspecified Mother     Hypertension Mother     Hypertension Father     Stroke Father     Heart attack Father     Colon cancer Father     Hypertension Sister     Scoliosis Sister      Kidney failure Brother     No Known Problems Maternal Aunt     No Known Problems Maternal Uncle     No Known Problems Paternal Aunt     No Known Problems Paternal Uncle     Thyroid disease unspecified Maternal Grandmother     COPD Maternal Grandmother     No Known Problems Maternal Grandfather     No Known Problems Paternal Grandmother     No Known Problems Paternal Grandfather     Asthma Son     Bronchiolitis Son     Asthma Son     Asthma Son     Bronchiolitis Daughter     Asthma Daughter        Meds/Allergies   Current Outpatient Medications   Medication Sig Dispense Refill    aspirin 81 mg chewable tablet Chew 1 tablet (81 mg total) daily 30 tablet 0    atorvastatin (LIPITOR) 40 mg tablet TAKE 1 TABLET BY MOUTH DAILY WITH DINNER 30 tablet 0    busPIRone (BUSPAR) 5 mg tablet Take 1 tablet (5 mg total) by mouth 2 (two) times a day 60 tablet 5    fluocinolone acetonide (DermOtic) 0.01 % otic oil Administer 5 drops into both ears 2 (two) times a day 20 mL 0    lisinopril (ZESTRIL) 10 mg tablet Take 0.5 tablets (5 mg total) by mouth daily 30 tablet 1    methimazole (TAPAZOLE) 5 mg tablet TAKE 0.5 TABLETS (2.5 MG TOTAL) BY MOUTH IN THE MORNING 30 tablet 1    nicotine polacrilex (NICORETTE) 2 mg gum CHEW 1 EACH (2 MG TOTAL) AS NEEDED FOR SMOKING CESSATION 30 each 0    PARoxetine (PAXIL) 10 mg tablet Take 2 tablets (20 mg total) by mouth daily 30 tablet 1    timolol (TIMOPTIC) 0.5 % ophthalmic solution Apply 1 drop to eye daily 10 mL 0    verapamil (CALAN-SR) 120 mg CR tablet Take 1 tablet (120 mg total) by mouth daily at bedtime 30 tablet 5    albuterol (PROVENTIL HFA,VENTOLIN HFA) 90 mcg/act inhaler Inhale 2 puffs every 6 (six) hours as needed for wheezing      Cholecalciferol (VITAMIN D3) 1,000 units tablet Take 1 tablet (1,000 Units total) by mouth daily (Patient not taking: Reported on 9/10/2024) 30 tablet 1    Erenumab-aooe 140 MG/ML SOAJ Inject 140 mg under the skin every 30 (thirty) days (Patient not taking:  "Reported on 9/10/2024) 1 mL 3     No current facility-administered medications for this visit.     Allergies   Allergen Reactions    Shellfish-Derived Products - Food Allergy Hives       Objective   Vitals: Blood pressure 120/80, pulse 80, height 5' 5\" (1.651 m), weight 66.7 kg (147 lb), SpO2 97%.    Physical Exam  Constitutional:       Appearance: She is well-developed.   HENT:      Head: Normocephalic and atraumatic.   Eyes:      Extraocular Movements: Extraocular movements intact.      Conjunctiva/sclera: Conjunctivae normal.      Comments: No lid lag/staring gaze     Neck:      Thyroid: No thyromegaly.      Comments: Thyromegaly present, nodular thyroid, nontender, not fixed to the overlying skin or surrounding structures    Cardiovascular:      Rate and Rhythm: Normal rate and regular rhythm.      Heart sounds: Normal heart sounds. No murmur heard.  Pulmonary:      Effort: Pulmonary effort is normal.      Breath sounds: Normal breath sounds. No wheezing.   Abdominal:      General: There is no distension.      Palpations: Abdomen is soft.      Tenderness: There is no abdominal tenderness.   Musculoskeletal:         General: Normal range of motion.      Cervical back: Normal range of motion and neck supple.   Skin:     General: Skin is warm and dry.   Neurological:      Mental Status: She is alert and oriented to person, place, and time.      Deep Tendon Reflexes: Reflexes normal.      Comments: Tremors on the outstretched arms +   Psychiatric:         Behavior: Behavior normal.         The history was obtained from the review of the chart, patient.    Lab Results:   Lab Results   Component Value Date/Time    TSH 3RD GENERATON 0.333 (L) 07/26/2024 11:02 AM    TSH 3RD GENERATON 0.291 (L) 05/07/2024 05:34 AM    Free T4 0.84 07/26/2024 11:02 AM    Free T4 0.85 05/07/2024 05:34 AM     Lab Results   Component Value Date    WBC 7.42 05/06/2024    HGB 13.3 05/06/2024    HCT 40.4 05/06/2024    MCV 98 05/06/2024    PLT " "281 05/06/2024     Lab Results   Component Value Date    CREATININE 0.66 05/07/2024    BUN 11 05/07/2024    K 3.8 05/07/2024     05/07/2024    CO2 28 05/07/2024     Lab Results   Component Value Date    HGBA1C 5.0 05/06/2024     Component      Latest Ref Rng 5/7/2024 7/26/2024   TSH 3RD GENERATON      0.450 - 4.500 uIU/mL 0.291 (L)  0.333 (L)    FREE T4      0.61 - 1.12 ng/dL 0.85  0.84    T3, Total      0.9-1.8 ng/mL ng/mL 1.2     THYROID STIMULATING IMMUNOGLOBULIN      0.00 - 0.55 IU/L <0.10     THYROID MICROSOMAL ANTIBODY      0 - 34 IU/mL <9     THYROGLOBULIN AB      0.0 - 0.9 IU/mL <1.0     FREE T3      2.50 - 3.90 pg/mL  3.42       Imaging Studies:   Results for orders placed during the hospital encounter of 08/08/24    US thyroid    Impression  Enlarged gland with multiple nodules which do not technically meet current ACR criteria for follow-up ultrasound recommendation.        Reference: ACR Thyroid Imaging, Reporting and Data System (TI-RADS): White Paper of the ACR TI-RADS Committee. J AM Jude Radiol 2017;14:587-595. Additional recommendations based on American Thyroid Association 2015 guidelines.      Workstation performed: [id number]      Personally reviewed the following image studies in PACS and associated radiology reports: Ultrasound thyroid. My interpretation of the radiology images/reports is: multiple thyroid nodules.    Portions of the record may have been created with voice recognition software. Occasional wrong word or \"sound a like\" substitutions may have occurred due to the inherent limitations of voice recognition software. Read the chart carefully and recognize, using context, where substitutions have occurred.    "

## 2024-09-10 NOTE — PATIENT INSTRUCTIONS
Please do not take methimazole for at least 4 to 5 days prior to thyroid uptake and scan which has been ordered  Labs to be done as ordered

## 2024-09-11 ENCOUNTER — PATIENT OUTREACH (OUTPATIENT)
Age: 49
End: 2024-09-11

## 2024-09-11 NOTE — PROGRESS NOTES
CMOC placed a call to patient, following up on referral for housing insecurity.     CMOC connected the call with patient. Patient stated her son had a baby and she has been spending time this past week, helping her family.     Patient confirmed that she received Find Help referral for [facility]. Patient stated she has not had a chance to call, but will call this week.     CMOC encouraged patient to reach out to CMOC if needs arise.     CMOC will continue to follow up.

## 2024-09-13 ENCOUNTER — PATIENT OUTREACH (OUTPATIENT)
Age: 49
End: 2024-09-13

## 2024-09-13 NOTE — PROGRESS NOTES
OP PHUC had called the patient via phone. OP SW left a voicemail. OP SW followed up patient establishing care with MH services. OP SW will attempt to call again at a late date and time. OP PHUC routed note to [facility] Sonia [place]. OP PHUC will continue to f/u.

## 2024-09-18 ENCOUNTER — PATIENT OUTREACH (OUTPATIENT)
Age: 49
End: 2024-09-18

## 2024-09-18 NOTE — PROGRESS NOTES
OP PHUC had called the patient via phone. OP PHUC left a voicemail. OP PHUC notes this is the second phone call attempt. OP PHUC sent unable to reach letter via [email or web address]. SWCM will await 2 weeks before closing case. KING FRIEND routed note to CMOC Sonia [place]. KNIG FRIEND will continue to f/u.

## 2024-09-18 NOTE — LETTER
09/18/24    Dear Cherie Arias,    I tried to reach you by phone and was unfortunately unable to reach you. It is important you be a part of your plan of care. If you are still in need of assistance with mental health services, please give me a call back at [phone] from 8am-4:30pm, Mon-Fri.    Sincerely,         KAPIL Mccarthy

## 2024-09-19 ENCOUNTER — PATIENT OUTREACH (OUTPATIENT)
Age: 49
End: 2024-09-19

## 2024-09-19 NOTE — PROGRESS NOTES
CMOC placed a call to patient, following up on referral for housing insecurity.     CMOC connected the call with patient. Patient stated that she continues to look for housing. Patient stated that she had a set back with her landlord releasing her security deposit.     Patient stated she received information regarding [facility] from "[employer]. Patient stated she called and left a message. Patient stated that she believes she received a voicemail from them. CMOC encouraged patient to check her voicemail and contact CMOC if assistance is needed.     CMOC will continue to follow up.

## 2024-09-23 ENCOUNTER — ANESTHESIA (OUTPATIENT)
Dept: ANESTHESIOLOGY | Facility: HOSPITAL | Age: 49
End: 2024-09-23

## 2024-09-23 ENCOUNTER — ANESTHESIA EVENT (OUTPATIENT)
Dept: ANESTHESIOLOGY | Facility: HOSPITAL | Age: 49
End: 2024-09-23

## 2024-09-26 ENCOUNTER — PATIENT OUTREACH (OUTPATIENT)
Age: 49
End: 2024-09-26

## 2024-09-26 NOTE — PROGRESS NOTES
CMOC placed a call to patient, following up on referral for housing insecurity.     CMOC was UTR patient at this time and a voicemail was left, requesting a call back.     CMOC will continue to follow up.

## 2024-10-01 DIAGNOSIS — R29.90 STROKE-LIKE SYMPTOMS: ICD-10-CM

## 2024-10-01 RX ORDER — ATORVASTATIN CALCIUM 40 MG/1
40 TABLET, FILM COATED ORAL
Qty: 30 TABLET | Refills: 0 | Status: SHIPPED | OUTPATIENT
Start: 2024-10-01

## 2024-10-02 ENCOUNTER — PATIENT OUTREACH (OUTPATIENT)
Age: 49
End: 2024-10-02

## 2024-10-02 ENCOUNTER — TELEPHONE (OUTPATIENT)
Dept: GASTROENTEROLOGY | Facility: CLINIC | Age: 49
End: 2024-10-02

## 2024-10-02 NOTE — TELEPHONE ENCOUNTER
Pt didn't respond to [employer] notification. Lmm for pt that she should have her miralax prep and a  . Rsc to call pt with arrival time and she should be on a clear liquid diet day before procedure . Any questions she can call us back. Sb

## 2024-10-02 NOTE — PROGRESS NOTES
Per OP Gertrude FRIEND F case is closed due to lack of communication. CMOC will remove herself from the care team at this time.     No future outreach scheduled.

## 2024-10-02 NOTE — PROGRESS NOTES
OP SW had completed a chart review. OP SW notes patient has not called back. OP SW will be closing case today due to lack of communication. OP SW routed note to CMOC Sonia [place]. Please reconsult SW for future needs.

## 2024-10-04 ENCOUNTER — APPOINTMENT (OUTPATIENT)
Dept: LAB | Facility: HOSPITAL | Age: 49
End: 2024-10-04
Attending: INTERNAL MEDICINE
Payer: COMMERCIAL

## 2024-10-04 DIAGNOSIS — Z13.1 SCREENING FOR DIABETES MELLITUS: ICD-10-CM

## 2024-10-04 DIAGNOSIS — E04.2 NONTOXIC MULTINODULAR GOITER: ICD-10-CM

## 2024-10-04 DIAGNOSIS — E05.90 PRETIBIAL MYXEDEMA: Primary | ICD-10-CM

## 2024-10-04 LAB
EST. AVERAGE GLUCOSE BLD GHB EST-MCNC: 97 MG/DL
HBA1C MFR BLD: 5 %

## 2024-10-04 PROCEDURE — 83036 HEMOGLOBIN GLYCOSYLATED A1C: CPT

## 2024-10-04 NOTE — TELEPHONE ENCOUNTER
The patient is having vascular surgery and needing to r/s her colonoscopy.  Please contact the pt to confirm if an OV is needed prior to the colonoscopy and if not how long should she wait to have the colonoscopy done. Colonoscopy has been cancelled for now.

## 2024-10-08 DIAGNOSIS — E05.90 SUBCLINICAL HYPERTHYROIDISM: Primary | ICD-10-CM

## 2024-10-08 DIAGNOSIS — E04.2 MULTIPLE THYROID NODULES: ICD-10-CM

## 2024-10-09 NOTE — PRE-PROCEDURE INSTRUCTIONS
Pre-Surgery Instructions:   Medication Instructions    albuterol (PROVENTIL HFA,VENTOLIN HFA) 90 mcg/act inhaler Uses PRN- OK to take day of surgery    aspirin 81 mg chewable tablet Hold day of surgery.    atorvastatin (LIPITOR) 40 mg tablet Take night before surgery    busPIRone (BUSPAR) 5 mg tablet Take night before surgery    fluocinolone acetonide (DermOtic) 0.01 % otic oil Hold day of surgery.    lisinopril (ZESTRIL) 10 mg tablet Take night before surgery    nicotine polacrilex (NICORETTE) 2 mg gum Stop taking 1 day prior to surgery.    PARoxetine (PAXIL) 10 mg tablet Take night before surgery    timolol (TIMOPTIC) 0.5 % ophthalmic solution Take night before surgery    verapamil (CALAN-SR) 120 mg CR tablet Take night before surgery    Medication instructions for day surgery reviewed. Please use only a sip of water to take your instructed medications. Avoid all over the counter vitamins, supplements and NSAIDS for one week prior to surgery per anesthesia guidelines. Tylenol is ok to take as needed.     You will receive a call one business day prior to surgery with an arrival time and hospital directions. If your surgery is scheduled on a Monday, the hospital will be calling you on the Friday prior to your surgery. If you have not heard from anyone by 8pm, please call the hospital supervisor through the hospital  at [phone]. ([place] [phone] or [place] [phone]).    Do not eat or drink anything after midnight the night before your surgery, including candy, mints, lifesavers, or chewing gum. Do not drink alcohol 24hrs before your surgery. Try not to smoke at least 24hrs before your surgery.       Follow the pre surgery showering instructions as listed in the “My Surgical Experience Booklet” or otherwise provided by your surgeon's office. Do not use a blade to shave the surgical area 1 week before surgery. It is okay to use a clean electric clippers up to 24 hours before surgery. Do not  apply any lotions, creams, including makeup, cologne, deodorant, or perfumes after showering on the day of your surgery. Do not use dry shampoo, hair spray, hair gel, or any type of hair products.     No contact lenses, eye make-up, or artificial eyelashes. Remove nail polish, including gel polish, and any artificial, gel, or acrylic nails if possible. Remove all jewelry including rings and body piercing jewelry.     Wear causal clothing that is easy to take on and off. Consider your type of surgery.    Keep any valuables, jewelry, piercings at home. Please bring any specially ordered equipment (sling, braces) if indicated.    Arrange for a responsible person to drive you to and from the hospital on the day of your surgery. Please confirm the visitor policy for the day of your procedure when you receive your phone call with an arrival time.     Call the surgeon's office with any new illnesses, exposures, or additional questions prior to surgery.    Please reference your “My Surgical Experience Booklet” for additional information to prepare for your upcoming surgery.

## 2024-10-10 PROBLEM — Z13.1 SCREENING FOR DIABETES MELLITUS: Status: RESOLVED | Noted: 2024-09-10 | Resolved: 2024-10-10

## 2024-10-15 ENCOUNTER — ANESTHESIA EVENT (OUTPATIENT)
Dept: PERIOP | Facility: HOSPITAL | Age: 49
End: 2024-10-15
Payer: COMMERCIAL

## 2024-10-16 PROBLEM — F17.200 SMOKING: Status: ACTIVE | Noted: 2024-10-16

## 2024-10-16 PROBLEM — Z98.51 S/P TUBAL LIGATION: Status: ACTIVE | Noted: 2024-10-16

## 2024-10-16 PROBLEM — IMO0001 SMOKING: Status: ACTIVE | Noted: 2024-10-16

## 2024-10-16 NOTE — ANESTHESIA PREPROCEDURE EVALUATION
Procedure:  Left GSV EVLT and left leg phlebectomies (Left: Leg Upper)    Relevant Problems   CARDIO   (+) Hypertension   (+) Varicose veins of bilateral lower extremities with pain      ENDO   (+) Subclinical hyperthyroidism      NEURO/PSYCH   (+) Anxiety and depression   (+) Numbness and tingling      PULMONARY   (+) Asthma   (+) Smoking      Obstetrics/Gynecology   (+) S/P tubal ligation        Physical Exam    Airway    Mallampati score: II  TM Distance: >3 FB  Neck ROM: full     Dental        Cardiovascular  Rhythm: regular, Rate: normal    Pulmonary   Breath sounds clear to auscultation    Other Findings  post-pubertal.      Anesthesia Plan  ASA Score- 2     Anesthesia Type- general with ASA Monitors.         Additional Monitors:     Airway Plan: LMA.           Plan Factors-    Chart reviewed.        Patient is a current smoker.  Patient instructed to abstain from smoking on day of procedure. Patient did not smoke on day of surgery.            Induction- intravenous.    Postoperative Plan- Plan for postoperative opioid use.     Perioperative Resuscitation Plan - Level 1 - Full Code.       Informed Consent- Anesthetic plan and risks discussed with patient.

## 2024-10-17 ENCOUNTER — HOSPITAL ENCOUNTER (OUTPATIENT)
Facility: HOSPITAL | Age: 49
Setting detail: OUTPATIENT SURGERY
Discharge: HOME | End: 2024-10-17
Attending: SURGERY | Admitting: SURGERY
Payer: COMMERCIAL

## 2024-10-17 ENCOUNTER — APPOINTMENT (OUTPATIENT)
Dept: RADIOLOGY | Facility: HOSPITAL | Age: 49
End: 2024-10-17
Payer: COMMERCIAL

## 2024-10-17 ENCOUNTER — ANESTHESIA (OUTPATIENT)
Dept: PERIOP | Facility: HOSPITAL | Age: 49
End: 2024-10-17
Payer: COMMERCIAL

## 2024-10-17 VITALS
DIASTOLIC BLOOD PRESSURE: 88 MMHG | SYSTOLIC BLOOD PRESSURE: 142 MMHG | RESPIRATION RATE: 18 BRPM | OXYGEN SATURATION: 97 % | HEART RATE: 88 BPM | BODY MASS INDEX: 24.72 KG/M2 | TEMPERATURE: 97.6 F | WEIGHT: 148.37 LBS | HEIGHT: 65 IN

## 2024-10-17 DIAGNOSIS — I83.813 VARICOSE VEINS OF BILATERAL LOWER EXTREMITIES WITH PAIN: Primary | ICD-10-CM

## 2024-10-17 DIAGNOSIS — I87.2 VENOUS INSUFFICIENCY: ICD-10-CM

## 2024-10-17 LAB
ANION GAP SERPL CALCULATED.3IONS-SCNC: 8 MMOL/L (ref 4–13)
BUN SERPL-MCNC: 11 MG/DL (ref 5–25)
CALCIUM SERPL-MCNC: 8.8 MG/DL (ref 8.4–10.2)
CHLORIDE SERPL-SCNC: 106 MMOL/L (ref 96–108)
CO2 SERPL-SCNC: 26 MMOL/L (ref 21–32)
CREAT SERPL-MCNC: 0.6 MG/DL (ref 0.6–1.3)
EXT PREGNANCY TEST URINE: NEGATIVE
EXT. CONTROL: NORMAL
GFR SERPL CREATININE-BSD FRML MDRD: 106 ML/MIN/1.73SQ M
GLUCOSE P FAST SERPL-MCNC: 71 MG/DL (ref 65–99)
GLUCOSE SERPL-MCNC: 71 MG/DL (ref 65–140)
POTASSIUM SERPL-SCNC: 3.7 MMOL/L (ref 3.5–5.3)
SODIUM SERPL-SCNC: 140 MMOL/L (ref 135–147)

## 2024-10-17 PROCEDURE — 36478 ENDOVENOUS LASER 1ST VEIN: CPT | Performed by: SURGERY

## 2024-10-17 PROCEDURE — 81025 URINE PREGNANCY TEST: CPT | Performed by: SURGERY

## 2024-10-17 PROCEDURE — 93971 EXTREMITY STUDY: CPT

## 2024-10-17 PROCEDURE — 37799 UNLISTED PX VASCULAR SURGERY: CPT | Performed by: PHYSICIAN ASSISTANT

## 2024-10-17 PROCEDURE — 80048 BASIC METABOLIC PNL TOTAL CA: CPT | Performed by: SURGERY

## 2024-10-17 PROCEDURE — NC001 PR NO CHARGE: Performed by: SURGERY

## 2024-10-17 PROCEDURE — 36478 ENDOVENOUS LASER 1ST VEIN: CPT | Performed by: PHYSICIAN ASSISTANT

## 2024-10-17 PROCEDURE — C1894 INTRO/SHEATH, NON-LASER: HCPCS | Performed by: SURGERY

## 2024-10-17 PROCEDURE — 37799 UNLISTED PX VASCULAR SURGERY: CPT | Performed by: SURGERY

## 2024-10-17 RX ORDER — FENTANYL CITRATE 50 UG/ML
INJECTION, SOLUTION INTRAMUSCULAR; INTRAVENOUS AS NEEDED
Status: DISCONTINUED | OUTPATIENT
Start: 2024-10-17 | End: 2024-10-17

## 2024-10-17 RX ORDER — SODIUM CHLORIDE, SODIUM LACTATE, POTASSIUM CHLORIDE, CALCIUM CHLORIDE 600; 310; 30; 20 MG/100ML; MG/100ML; MG/100ML; MG/100ML
100 INJECTION, SOLUTION INTRAVENOUS CONTINUOUS
Status: DISCONTINUED | OUTPATIENT
Start: 2024-10-17 | End: 2024-10-17 | Stop reason: HOSPADM

## 2024-10-17 RX ORDER — ONDANSETRON 2 MG/ML
4 INJECTION INTRAMUSCULAR; INTRAVENOUS ONCE AS NEEDED
Status: DISCONTINUED | OUTPATIENT
Start: 2024-10-17 | End: 2024-10-17 | Stop reason: HOSPADM

## 2024-10-17 RX ORDER — LIDOCAINE HYDROCHLORIDE 10 MG/ML
INJECTION, SOLUTION EPIDURAL; INFILTRATION; INTRACAUDAL; PERINEURAL AS NEEDED
Status: DISCONTINUED | OUTPATIENT
Start: 2024-10-17 | End: 2024-10-17

## 2024-10-17 RX ORDER — DEXAMETHASONE SODIUM PHOSPHATE 10 MG/ML
INJECTION, SOLUTION INTRAMUSCULAR; INTRAVENOUS AS NEEDED
Status: DISCONTINUED | OUTPATIENT
Start: 2024-10-17 | End: 2024-10-17

## 2024-10-17 RX ORDER — HYDROCODONE BITARTRATE AND ACETAMINOPHEN 5; 325 MG/1; MG/1
1 TABLET ORAL EVERY 8 HOURS PRN
Qty: 12 TABLET | Refills: 0 | Status: SHIPPED | OUTPATIENT
Start: 2024-10-17 | End: 2024-10-27

## 2024-10-17 RX ORDER — FENTANYL CITRATE/PF 50 MCG/ML
50 SYRINGE (ML) INJECTION
Status: DISCONTINUED | OUTPATIENT
Start: 2024-10-17 | End: 2024-10-17 | Stop reason: HOSPADM

## 2024-10-17 RX ORDER — PROPOFOL 10 MG/ML
INJECTION, EMULSION INTRAVENOUS AS NEEDED
Status: DISCONTINUED | OUTPATIENT
Start: 2024-10-17 | End: 2024-10-17

## 2024-10-17 RX ORDER — SODIUM CHLORIDE 9 MG/ML
INJECTION, SOLUTION INTRAVENOUS AS NEEDED
Status: DISCONTINUED | OUTPATIENT
Start: 2024-10-17 | End: 2024-10-17 | Stop reason: HOSPADM

## 2024-10-17 RX ORDER — ONDANSETRON 2 MG/ML
INJECTION INTRAMUSCULAR; INTRAVENOUS AS NEEDED
Status: DISCONTINUED | OUTPATIENT
Start: 2024-10-17 | End: 2024-10-17

## 2024-10-17 RX ORDER — CEFAZOLIN SODIUM 2 G/50ML
2000 SOLUTION INTRAVENOUS ONCE
Status: COMPLETED | OUTPATIENT
Start: 2024-10-17 | End: 2024-10-17

## 2024-10-17 RX ORDER — CHLORHEXIDINE GLUCONATE ORAL RINSE 1.2 MG/ML
15 SOLUTION DENTAL ONCE
Status: DISCONTINUED | OUTPATIENT
Start: 2024-10-17 | End: 2024-10-17 | Stop reason: HOSPADM

## 2024-10-17 RX ORDER — KETAMINE HCL IN NACL, ISO-OSM 100MG/10ML
SYRINGE (ML) INJECTION AS NEEDED
Status: DISCONTINUED | OUTPATIENT
Start: 2024-10-17 | End: 2024-10-17

## 2024-10-17 RX ORDER — MIDAZOLAM HYDROCHLORIDE 2 MG/2ML
INJECTION, SOLUTION INTRAMUSCULAR; INTRAVENOUS AS NEEDED
Status: DISCONTINUED | OUTPATIENT
Start: 2024-10-17 | End: 2024-10-17

## 2024-10-17 RX ADMIN — CEFAZOLIN SODIUM 2000 MG: 2 SOLUTION INTRAVENOUS at 09:22

## 2024-10-17 RX ADMIN — DEXAMETHASONE SODIUM PHOSPHATE 10 MG: 10 INJECTION, SOLUTION INTRAMUSCULAR; INTRAVENOUS at 09:29

## 2024-10-17 RX ADMIN — FENTANYL CITRATE 50 MCG: 50 INJECTION, SOLUTION INTRAMUSCULAR; INTRAVENOUS at 09:36

## 2024-10-17 RX ADMIN — SODIUM CHLORIDE, SODIUM LACTATE, POTASSIUM CHLORIDE, AND CALCIUM CHLORIDE: .6; .31; .03; .02 INJECTION, SOLUTION INTRAVENOUS at 09:13

## 2024-10-17 RX ADMIN — PROPOFOL 200 MG: 10 INJECTION, EMULSION INTRAVENOUS at 09:20

## 2024-10-17 RX ADMIN — Medication 10 MG: at 09:43

## 2024-10-17 RX ADMIN — LIDOCAINE HYDROCHLORIDE 50 MG: 10 INJECTION, SOLUTION EPIDURAL; INFILTRATION; INTRACAUDAL; PERINEURAL at 09:20

## 2024-10-17 RX ADMIN — FENTANYL CITRATE 50 MCG: 50 INJECTION, SOLUTION INTRAMUSCULAR; INTRAVENOUS at 09:20

## 2024-10-17 RX ADMIN — FENTANYL CITRATE 50 MCG: 50 INJECTION INTRAMUSCULAR; INTRAVENOUS at 11:16

## 2024-10-17 RX ADMIN — FENTANYL CITRATE 50 MCG: 50 INJECTION INTRAMUSCULAR; INTRAVENOUS at 11:03

## 2024-10-17 RX ADMIN — MIDAZOLAM 2 MG: 1 INJECTION INTRAMUSCULAR; INTRAVENOUS at 09:12

## 2024-10-17 RX ADMIN — Medication 20 MG: at 09:36

## 2024-10-17 RX ADMIN — ONDANSETRON 4 MG: 2 INJECTION INTRAMUSCULAR; INTRAVENOUS at 09:29

## 2024-10-17 NOTE — OP NOTE
OPERATIVE REPORT  PATIENT NAME: Cherie Arias    :  1975  MRN: [id number]  Pt Location: [facility] OR ROOM 02    SURGERY DATE: 10/17/2024    Surgeons and Role:     * Mario Rico DO - Primary     * Yifan Sumner PA-C - Assisting there is no  GME resident or fellow available for assistance he was required for assistance with surgical exposure and closure    Preop Diagnosis:  Varicose veins of bilateral lower extremities with pain [I83.813]  Symptomatic left lower extremity varicose veins    Post-Op Diagnosis Codes:     * Varicose veins of bilateral lower extremities with pain [I83.813]  Same      Procedure(s):  Left - Left GSV EVLT and 5 left leg phlebectomies    Estimated Blood Loss:   Minimal    Anesthesia Type:   General/LMA    Operative Indications:  Varicose veins of bilateral lower extremities with pain [I83.813]  49-year-old female with symptomatic left calf varicose veins she failed conservative therapy and had a reflux duplex that showed left great saphenous vein insufficiency and no DVT I offered her a great saphenous vein EVLT and phlebectomies and after discussing all risks benefits and alternative therapies she consented to proceed      Operative Findings:  Successful closure of the left great saphenous vein with no evidence of DVT.        Complications:   None    Procedure and Technique:  Informed consent was obtained for the appropriate part of the patient was correctly identified in the holding area brought to the operating room placed in the supine position appropriate lines and monitors were placed after satisfactory induction of general LMA anesthesia the left leg was prepped and draped in the normal sterile fashion a Jako timeout was performed the patient received appropriate periprocedural antibiotics.  I initially began with an ultrasound of the left leg and took a measurement of the diameter of the left great saphenous vein I then accessed the left great saphenous vein below the  knee with a micropuncture needle advanced a wire into the left great saphenous vein and then exchanged the needle for a 4 [place] sheath I then advanced an 035 wire from the from the catheter kit into left great saphenous vein into the left common femoral vein and then exchanged the existing sheath for the long procedural sheath the dilator and wire were removed the laser catheter was advanced through the tip of the existing sheath and then the saphenofemoral junction was identified under ultrasound and the tip of the laser was withdrawn 5 cm from the saphenofemoral junction.  I then instilled tumescent anesthesia circumferentially around the left great saphenous vein and then at this point treated the left great saphenous vein with laser therapy with a continuous pullback technique with a setting of 7 W and a total pullback time of 209 seconds and a total energy of 1464 J.  Once this was done the left great saphenous vein was reevaluated with color-flow Doppler and noted to be occluded there was no evidence of DVT in the left common femoral vein as it easily compressed.  Attention was then turned towards the phlebectomies total of 5 incisions were made 1 anteriorly on the left calf and 4 posteriorly on the left calf and then the veins were avulsed using hemostats and vein hooks pressure was held for hemostasis Steri-Strips were applied for dressing and a multilayer wrap was placed around the leg.  The patient tolerated the procedure well           I was present for the entire procedure.    Patient Disposition:  PACU              SIGNATURE: Mario Rico DO  DATE: October 17, 2024  TIME: 10:21 AM

## 2024-10-17 NOTE — ANESTHESIA POSTPROCEDURE EVALUATION
Post-Op Assessment Note    CV Status:  Stable  Pain Score: 3    Pain management: adequate       Mental Status:  Awake   Hydration Status:  Stable   PONV Controlled:  None   Airway Patency:  Patent     Post Op Vitals Reviewed: Yes    No anethesia notable event occurred.    Staff: Anesthesiologist           Last Filed PACU Vitals:  Vitals Value Taken Time   Temp 97.6 °F (36.4 °C) 10/17/24 1025   Pulse 86 10/17/24 1116   /90 10/17/24 1116   Resp     SpO2 98 % 10/17/24 1128       Modified Bill:  Activity: 2 (10/17/2024 11:03 AM)  Respiration: 2 (10/17/2024 11:03 AM)  Circulation: 2 (10/17/2024 11:03 AM)  Consciousness: 1 (10/17/2024 11:03 AM)  Oxygen Saturation: 2 (10/17/2024 11:03 AM)  Modified Bill Score: 9 (10/17/2024 11:03 AM)

## 2024-10-17 NOTE — H&P
"H&P - Vascular Surgery   Name: Cherie Arias 49 y.o. female I MRN: [id number]  Unit/Bed#: OR POOL I Date of Admission: 10/17/2024   Date of Service: 10/17/2024 I Hospital Day: 0     Assessment & Plan  Smoking    S/P tubal ligation  LLE varicose veins  Left GSV EVLT and phlebectomies    History of Present Illness   Cherie Arias is a 49 y.o. female who presents for procedure.    Review of Systems  I have reviewed the patient's PMH, PSH, Social History, Family History, Meds, and Allergies    Objective :  Temp:  [97.3 °F (36.3 °C)] 97.3 °F (36.3 °C)  HR:  [73] 73  BP: (145)/(88) 145/88  Resp:  [18] 18  SpO2:  [100 %] 100 %  O2 Device: None (Room air)    I/O       None            Physical Exam   Breathing unlabored  RRR  Ab soft,   LLE calf varicosities        Lab Results: I have reviewed the following results:  No results for input(s): \"WBC\", \"HGB\", \"HCT\", \"PLT\", \"BANDSPCT\", \"SODIUM\", \"K\", \"CL\", \"CO2\", \"BUN\", \"CREATININE\", \"GLUC\", \"CAIONIZED\", \"MG\", \"PHOS\", \"AST\", \"ALT\", \"ALB\", \"TBILI\", \"DBILI\", \"ALKPHOS\", \"PTT\", \"INR\", \"HSTNI0\", \"HSTNI2\", \"BNP\", \"LACTICACID\" in the last 72 hours.    Imaging Results Review: No pertinent imaging studies reviewed.  Other Study Results Review: No additional pertinent studies reviewed.    VTE Prophylaxis:   "

## 2024-10-17 NOTE — DISCHARGE INSTR - AVS FIRST PAGE
DISCHARGE INSTRUCTIONS  VARICOSE VEIN SURGERY    ACTIVITY:  On the day of your operation, “take it easy”. You can take short walks around the house. When sitting, the leg should be elevated. The preferred position is to have the leg at or above the level of the heart. Starting on the first day after surgery, light walking is encouraged as tolerated. After your ultrasound test, you can resume your normal activity, but no heavy lifting (do not lift more than 15 pounds) or strenuous exercise for 2 weeks.   You should not drive a car until your bandages are removed and you are off all narcotic pain medication.  You may ride in a car.      DIET: Resume your normal diet.  Good nutrition is important for healing of your incision.    DRESSINGS/SURGICAL SITE:  When released from the hospital, you should have a compression bandage in place on the operated leg.  This bandage should feel snug, but not too tight.  If the bandage becomes blood soaked or painfully tight, elevate your leg and call the office ([phone])  You may have surgical glue on your incision sites.   There are stitches present under the skin which will absorb on their own.   The glue is used to cover the incision, assist in closure, and prevent contamination. This adhesive will darken and peel away on its own within one to two weeks. Do not pick at it.    You should shower daily.  Wash incisions daily with soap and water, but do not rub or scrub the incisions; rinse thoroughly and pat dry.      If the operated leg becomes increasingly painful or swollen, or if there is increasing redness or pain around your incision, contact our office.  Some bruising of the skin is common after varicose vein surgery.  This can be lessened by elevation of the leg. Many patients will notice some numbness of the shin, ankle, calf, or the top of the foot. This usually improves with time but may be persistent.  After surgery you can expect bruising, swelling and hard knots on  your leg.  As your body heals the bruising will fade and the swelling and knots will subside.  Apply sunscreen with SPF 30 to incisions while sun bathing for up to one year after surgery to reduce the chances of your incisions darkening.    FOLLOW UP STUDIES:  Your first post-operative appointment will be 2-3 days after your surgery. At this appointment your bandages will be removed, and you will be seen by a Vascular Surgeon, Nurse Practitioner, or Physician Assistant. An appointment for a follow up Doppler ultrasound study will be scheduled on the same day as your follow up appointment.     FOLLOW UP APPOINTMENTS:  Making and keeping follow up appointments and ultrasound tests are important to your recovery.  If you have difficulty making it to or keeping your follow up appointments, call the office.    If you have increased pain, fever >101.5, increased drainage, redness or a bad smell at your surgery site, new coldness/numbness of your arm or leg, please call us immediately and GO directly to the ER.    PLEASE CALL THE OFFICE IF YOU HAVE ANY QUESTIONS  [phone]  [phone] [address], [address], [place] [address]  [address] [place] [address]  [address] [facility], [address], [place] [address]  [address] [facility], [place] [address]  [address] [facility], [address], [place] [address]  [address] [facility], [address], [place] [address]  [address] [place] [address]  [address] [facility], [address]st [address], [address], [place] [address]  [address], [place] [address]  [address] [facility], [address], [place] [address]

## 2024-10-18 PROCEDURE — NC001 PR NO CHARGE: Performed by: SURGERY

## 2024-10-21 ENCOUNTER — HOSPITAL ENCOUNTER (OUTPATIENT)
Dept: RADIOLOGY | Facility: HOSPITAL | Age: 49
Discharge: HOME | End: 2024-10-21
Attending: SURGERY
Payer: COMMERCIAL

## 2024-10-21 ENCOUNTER — OFFICE VISIT (OUTPATIENT)
Dept: VASCULAR SURGERY | Facility: CLINIC | Age: 49
End: 2024-10-21

## 2024-10-21 VITALS
SYSTOLIC BLOOD PRESSURE: 110 MMHG | OXYGEN SATURATION: 98 % | BODY MASS INDEX: 25.92 KG/M2 | HEART RATE: 73 BPM | WEIGHT: 155.6 LBS | HEIGHT: 65 IN | DIASTOLIC BLOOD PRESSURE: 74 MMHG

## 2024-10-21 DIAGNOSIS — I83.813 VARICOSE VEINS OF BILATERAL LOWER EXTREMITIES WITH PAIN: Primary | ICD-10-CM

## 2024-10-21 DIAGNOSIS — I83.813 VARICOSE VEINS OF BILATERAL LOWER EXTREMITIES WITH PAIN: ICD-10-CM

## 2024-10-21 DIAGNOSIS — I83.819 VARICOSE VEINS OF UNSPECIFIED LOWER EXTREMITY WITH PAIN: ICD-10-CM

## 2024-10-21 PROCEDURE — 99024 POSTOP FOLLOW-UP VISIT: CPT | Performed by: PHYSICIAN ASSISTANT

## 2024-10-21 PROCEDURE — 93971 EXTREMITY STUDY: CPT

## 2024-10-21 NOTE — PATIENT INSTRUCTIONS
Left leg vein surgery     - Graded compression when comfortable and able to tolerate  - Elevate leg as often as you can  - Activity as tolerated.   - Tylenol or ice for mild soreness  - Continue healthy lifestyle changes; heart-healthy diet, low sodium and regular walking.  - Patient education for venous insufficiency.   - May shower; but no bathing, hot tubs, beaches for now  -Avoid lotions, creams to leg until everything is healed.   - Call right away, if you feel ill, develop fever, chest pain, shortness of breath, increased leg pain, redness at procedure sites, bleeding, numbness of the leg or pain/discoloration of feet.   - Follow up Dr. Rico in about 4 weeks

## 2024-10-21 NOTE — PROGRESS NOTES
Ambulatory Visit  Name: Cherie Arias      : 1975      MRN: [id number]  Encounter Provider: Brianna Menard PA-C  Encounter Date: 10/21/2024   Encounter department: [facility]    Assessment & Plan  Varicose veins of bilateral lower extremities with pain  S/P left Leg endovascular laser therapy and stab phlebectomy x 5 (Jacky, 10/17/24)     Symptomatic varicose veins    S:   -POD #4  -Moderate L LE discomfort in the thigh and lower leg with difficulty standing/ walking  -Felt bandage was very tight at the top and noted her left foot was numb all weekend  -Reports some drainage from tiny presumably tumescent anesthesia sites (no drainage or infection noted on exam)  -Vagal episode when the bandage was removed in the office  -No CP/SOB      Exam:    L LE  -Medial thigh and lower leg tender on palp  -5 Stab sites intact; no erythema or bleeding.   -Mild LE edema; calf Non-tender; 2+ DP pulse  -Overall extremity appears as expected after vein surgery     A/P   -POD #4 Left GSV EVLT and 5 left leg phlebectomies  -At least moderate L LE discomfort and disability walking  -Venous Doppler of the left lower extremity scheduled for later today as per protocol  -May use acetaminophen, cool compresses several times daily and periodic elevation for leg discomfort  -Ambulation is encouraged  -We discussed venous insufficiency, post EVLT instructions and follow up.   -Graded compression when comfortable and able to tolerate   -She is instructed to call the office if her leg / walking is not improving in the next couple of days or if she develops new or worsening symptoms  -We reviewed symptoms regarding any leg, as well as chest pain or shortness of breath which she should go to the emergency department  -Follow up with Dr. Rico in about 4-6 weeks               History of Present Illness     Patient is s/p L EVLT w/ 5 stabs on 10/17/24 by Dr. Rico. Pt c/o LLE swelling, and numbness and tingling with  "sitting and standing.  Pt denies fevers or chills.     Cherie Arias is a 49 y.o. female smoker, hypertension with symptomatic varicose veins for which she underwent left GSV EVLT and stab phleb x 5 on 10/17/24 by Dr. Rico.    10/21/24: POD #4    -Tight bandage and numbness in the L foot  -Tenderness and discomfort over the extremity postsop  -Disability walking and limping around due to discomfort  -She did try to mobilize around the house  -No CP, SOB. No F.     -Bandage removed in the office today by staff  -Patient had a vagal episode with dizziness and weakness which quickly resolved placing her flat and with OJ  -Continued moderate tenderness over there thigh and calf  -Mild edema. Stab sites intact. 2+ DP pulse.           Review of Systems   Constitutional: Negative.    HENT: Negative.     Eyes: Negative.    Respiratory: Negative.     Cardiovascular: Negative.    Gastrointestinal: Negative.    Endocrine: Negative.    Genitourinary: Negative.    Musculoskeletal: Negative.    Skin: Negative.    Allergic/Immunologic: Negative.    Neurological: Negative.    Hematological: Negative.    Psychiatric/Behavioral: Negative.             Objective     /74 (BP Location: Right arm, Patient Position: Supine)   Pulse 73   Ht 5' 5\" (1.651 m)   Wt 70.6 kg (155 lb 9.6 oz)   LMP 02/07/2024   SpO2 98%   BMI 25.89 kg/m²     Physical Exam  Vitals and nursing note reviewed.   Constitutional:       Appearance: She is well-developed.   HENT:      Head: Normocephalic and atraumatic.   Eyes:      Pupils: Pupils are equal, round, and reactive to light.   Cardiovascular:      Rate and Rhythm: Normal rate and regular rhythm.      Pulses:           Dorsalis pedis pulses are 2+ on the left side.      Heart sounds: Normal heart sounds, S1 normal and S2 normal. No murmur heard.     No friction rub. No gallop.   Pulmonary:      Effort: Pulmonary effort is normal. No accessory muscle usage or respiratory distress.      Breath " "sounds: Normal breath sounds. No wheezing or rales.   Abdominal:      Palpations: Abdomen is soft.   Musculoskeletal:         General: No deformity. Normal range of motion.      Left lower leg: Edema present.   Skin:     General: Skin is warm and dry.      Findings: No lesion or rash.      Nails: There is no clubbing.   Neurological:      Mental Status: She is alert and oriented to person, place, and time.      Comments: Grossly normal    Psychiatric:         Behavior: Behavior is cooperative.           I have reviewed and made appropriate changes to the review of systems input by the medical assistant.    Vitals:    10/21/24 0944   BP: 110/74   BP Location: Right arm   Patient Position: Supine   Pulse: 73   SpO2: 98%   Weight: 70.6 kg (155 lb 9.6 oz)   Height: 5' 5\" (1.651 m)       Patient Active Problem List   Diagnosis    Stroke-like symptoms    Hypertension    Subclinical hyperthyroidism    Nicotine abuse    Asthma    Varicose veins of bilateral lower extremities with pain    Perimenopause    Anxiety and depression    Insomnia    Multiple thyroid nodules    Increased thirst    Numbness and tingling    Vision changes    Smoking    S/P tubal ligation       Past Surgical History:   Procedure Laterality Date    DENTAL SURGERY      DILATION AND EVACUATION      ECTOPIC PREGNANCY SURGERY      GA ENDOVEN ABLTJ INCMPTNT VEIN XTR LASER 1ST VEIN Left 10/17/2024    Procedure: Left GSV EVLT and left leg phlebectomies;  Surgeon: Mario Rico DO;  [facility];  Service: Vascular    TUBAL LIGATION         Family History   Problem Relation Age of Onset    Colon cancer Mother     Thyroid disease unspecified Mother     Hypertension Mother     Hypertension Father     Stroke Father     Heart attack Father     Colon cancer Father     Hypertension Sister     Scoliosis Sister     Kidney failure Brother     No Known Problems Maternal Aunt     No Known Problems Maternal Uncle     No Known Problems Paternal Aunt     No Known " Problems Paternal Uncle     Thyroid disease unspecified Maternal Grandmother     COPD Maternal Grandmother     No Known Problems Maternal Grandfather     No Known Problems Paternal Grandmother     No Known Problems Paternal Grandfather     Asthma Son     Bronchiolitis Son     Asthma Son     Asthma Son     Bronchiolitis Daughter     Asthma Daughter        Social History     Socioeconomic History    Marital status: /Civil Union     Spouse name: Not on file    Number of children: 6    Years of education: Not on file    Highest education level: Not on file   Occupational History    Not on file   Tobacco Use    Smoking status: Every Day     Current packs/day: 0.50     Types: Cigarettes     Passive exposure: Never    Smokeless tobacco: Never    Tobacco comments:     Uses nicorette gum.   Vaping Use    Vaping status: Never Used   Substance and Sexual Activity    Alcohol use: Not Currently     Comment: Every now and then    Drug use: Yes     Types: Marijuana     Comment: occ    Sexual activity: Yes     Partners: Male   Other Topics Concern    Not on file   Social History Narrative    Not on file     Social Determinants of Health     Financial Resource Strain: Low Risk  (8/28/2024)    Overall Financial Resource Strain (CARDIA)     Difficulty of Paying Living Expenses: Not very hard   Food Insecurity: No Food Insecurity (8/28/2024)    Hunger Vital Sign     Worried About Running Out of Food in the Last Year: Never true     Ran Out of Food in the Last Year: Never true   Transportation Needs: No Transportation Needs (8/28/2024)    PRAPARE - Transportation     Lack of Transportation (Medical): No     Lack of Transportation (Non-Medical): No   Physical Activity: Not on file   Stress: No Stress Concern Present (8/28/2024)    [employer] - Occupational Stress Questionnaire     Feeling of Stress : Not at all   Social Connections: Not on file   Intimate Partner Violence: Not on file   Housing  Stability: Low Risk  (8/28/2024)    Housing Stability Vital Sign     Unable to Pay for Housing in the Last Year: No     Number of Times Moved in the Last Year: 1     Homeless in the Last Year: No       Allergies   Allergen Reactions    Shellfish-Derived Products - Food Allergy Hives         Current Outpatient Medications:     albuterol (PROVENTIL HFA,VENTOLIN HFA) 90 mcg/act inhaler, Inhale 2 puffs every 6 (six) hours as needed for wheezing, Disp: , Rfl:     aspirin 81 mg chewable tablet, Chew 1 tablet (81 mg total) daily, Disp: 30 tablet, Rfl: 0    atorvastatin (LIPITOR) 40 mg tablet, TAKE 1 TABLET BY MOUTH DAILY WITH DINNER, Disp: 30 tablet, Rfl: 0    busPIRone (BUSPAR) 5 mg tablet, Take 1 tablet (5 mg total) by mouth 2 (two) times a day, Disp: 60 tablet, Rfl: 5    Cholecalciferol (VITAMIN D3) 1,000 units tablet, Take 1 tablet (1,000 Units total) by mouth daily, Disp: 30 tablet, Rfl: 1    Erenumab-aooe 140 MG/ML SOAJ, Inject 140 mg under the skin every 30 (thirty) days, Disp: 1 mL, Rfl: 3    fluocinolone acetonide (DermOtic) 0.01 % otic oil, Administer 5 drops into both ears 2 (two) times a day, Disp: 20 mL, Rfl: 0    HYDROcodone-acetaminophen (Norco) 5-325 mg per tablet, Take 1 tablet by mouth every 8 (eight) hours as needed for pain for up to 10 days Max Daily Amount: 3 tablets, Disp: 12 tablet, Rfl: 0    lisinopril (ZESTRIL) 10 mg tablet, Take 0.5 tablets (5 mg total) by mouth daily (Patient taking differently: Take 5 mg by mouth daily at bedtime), Disp: 30 tablet, Rfl: 1    nicotine polacrilex (NICORETTE) 2 mg gum, CHEW 1 EACH (2 MG TOTAL) AS NEEDED FOR SMOKING CESSATION, Disp: 30 each, Rfl: 0    PARoxetine (PAXIL) 10 mg tablet, Take 2 tablets (20 mg total) by mouth daily (Patient taking differently: Take 20 mg by mouth daily at bedtime), Disp: 30 tablet, Rfl: 1    timolol (TIMOPTIC) 0.5 % ophthalmic solution, Apply 1 drop to eye daily, Disp: 10 mL, Rfl: 0    verapamil (CALAN-SR) 120 mg CR tablet, Take 1  tablet (120 mg total) by mouth daily at bedtime, Disp: 30 tablet, Rfl: 5

## 2024-10-21 NOTE — ASSESSMENT & PLAN NOTE
S/P left Leg endovascular laser therapy and stab phlebectomy x 5 (Jacky, 10/17/24)     Symptomatic varicose veins    S:   -POD #4  -Moderate L LE discomfort in the thigh and lower leg with difficulty standing/ walking  -Felt bandage was very tight at the top and noted her left foot was numb all weekend  -Reports some drainage from tiny presumably tumescent anesthesia sites (no drainage or infection noted on exam)  -Vagal episode when the bandage was removed in the office  -No CP/SOB      Exam:    L LE  -Medial thigh and lower leg tender on palp  -5 Stab sites intact; no erythema or bleeding.   -Mild LE edema; calf Non-tender; 2+ DP pulse  -Overall extremity appears as expected after vein surgery     A/P   -POD #4 Left GSV EVLT and 5 left leg phlebectomies  -At least moderate L LE discomfort and disability walking  -Venous Doppler of the left lower extremity scheduled for later today as per protocol  -May use acetaminophen, cool compresses several times daily and periodic elevation for leg discomfort  -Ambulation is encouraged  -We discussed venous insufficiency, post EVLT instructions and follow up.   -Graded compression when comfortable and able to tolerate   -She is instructed to call the office if her leg / walking is not improving in the next couple of days or if she develops new or worsening symptoms  -We reviewed symptoms regarding any leg, as well as chest pain or shortness of breath which she should go to the emergency department  -Follow up with Dr. Rico in about 4-6 weeks

## 2024-10-22 PROCEDURE — 93971 EXTREMITY STUDY: CPT | Performed by: SURGERY

## 2024-11-15 ENCOUNTER — HOSPITAL ENCOUNTER (OUTPATIENT)
Dept: RADIOLOGY | Facility: HOSPITAL | Age: 49
Discharge: HOME | End: 2024-11-15
Payer: COMMERCIAL

## 2024-11-15 VITALS — WEIGHT: 155 LBS | HEIGHT: 65 IN | BODY MASS INDEX: 25.83 KG/M2

## 2024-11-15 DIAGNOSIS — Z12.31 BREAST CANCER SCREENING BY MAMMOGRAM: ICD-10-CM

## 2024-11-15 PROCEDURE — 77067 SCR MAMMO BI INCL CAD: CPT

## 2024-11-15 PROCEDURE — 77063 BREAST TOMOSYNTHESIS BI: CPT

## 2024-11-22 ENCOUNTER — RESULTS FOLLOW-UP (OUTPATIENT)
Age: 49
End: 2024-11-22

## 2024-11-29 ENCOUNTER — TELEPHONE (OUTPATIENT)
Dept: NEUROLOGY | Facility: CLINIC | Age: 49
End: 2024-11-29

## 2024-11-29 NOTE — TELEPHONE ENCOUNTER
Need pharmacy benefit info    Called [facility] pharmacy, spoke to Ellen Loza [id number]  [facility]  [employer]  Id [id number]  [phone]    Aimovig PA started on CMM.  (Key: [id number])    Called and left a message on pt's answering machine for a call back.    When pt calls back, pls ask if aimovig if effective. Need # of migraines days per month.

## 2024-11-29 NOTE — TELEPHONE ENCOUNTER
Reason for call:   [x] Prior Auth  [] Other:     Caller:  [] Patient  [x] Pharmacy  Name: [id number] - [place], [place]   Address:   Callback Number:     Medication: Erenumab-aooe 140 MG/ML SOAJ Inject 140 mg under the skin every 30 (thirty) days       Ordering Provider:   [] PCP/Provider -   [x] Speciality/Provider - [facility]     Has the patient tried other medications and failed? If failed, which medications did they fail?    [] No   [x] Yes -     Is the patient's insurance updated in EPIC?   [x] Yes   [] No     Is a copy of the patient's insurance scanned in EPIC?   [x] Yes   [] No

## 2024-12-02 NOTE — TELEPHONE ENCOUNTER
Pt states about 12-14 days of migrianes a month and pt states she has not started Amovig. Pt states she never was able to start Amovig medication because pharmacy never filled script .

## 2024-12-06 DIAGNOSIS — I10 PRIMARY HYPERTENSION: ICD-10-CM

## 2024-12-06 RX ORDER — LISINOPRIL 10 MG/1
5 TABLET ORAL
Qty: 30 TABLET | Refills: 1 | Status: SHIPPED | OUTPATIENT
Start: 2024-12-06

## 2024-12-06 NOTE — TELEPHONE ENCOUNTER
Determination not received yet.     Called [facility] at [phone] and spoke to brennan.  Robert denied as pt must try and fail 2 preferred medications -beta blocker, antidepressant, anti-seizure.  Need Length of trial and reason for failure.  Asked that she fax denial letter    Made her aware that it was noted on PA-  Currently on verapamil  We can't use topamax as she has potential for glaucoma. We can't use amitriptyline as it can elevated BP.  Beta blockers are contraindicated due to med h/o of asthma and interaction w/ current bp meds    She will send this to be [id number] hour turn around time

## 2024-12-10 NOTE — PROGRESS NOTES
Virtual Brief Visit  Name: Cherie Arias      : 1975      MRN: [id number]  Encounter Provider: Mario Rico DO  Encounter Date: 2024   Encounter department: [facility]    This Visit is being completed by telephone. The Patient is located at Home and in the following state in which I hold an active license [place]    The patient was identified by name and date of birth. Cherie Arias was informed that this is a telemedicine visit and that the visit is being conducted through the [employer] Teams platform. She agrees to proceed..  My office door was closed. No one else was in the room.  She acknowledged consent and understanding of privacy and security of the video platform. The patient has agreed to participate and understands they can discontinue the visit at any time.    Patient is aware this is a billable service.     :  Assessment & Plan  Varicose veins of bilateral lower extremities with pain         Varicose veins of bilateral lower extremities with pain  Will 49-year-old female virtual visit for follow-up for left lower extremity varicose veins.  She had limited phlebectomies in the left posterior calf as well as a left great saphenous vein EVLT, myself in October.  She has done well since this time she had a postop duplex that demonstrated closure of her great saphenous vein with no ehit.  She reports that her symptoms are much improved her incisions have healed well she does have some small varicosities on her right posterior calf but at this point they are really minor nuisance to her.  I recommended continued compression therapy and she does not wish to proceed with phlebectomy or EVLT on the right at this time she will contact our office if her symptoms worsen for a follow-up visit.  She can follow-up as needed at this time             History of Present Illness   HPI    Visit Time  Total Visit Duration: 20

## 2024-12-11 ENCOUNTER — TELEMEDICINE (OUTPATIENT)
Dept: VASCULAR SURGERY | Facility: CLINIC | Age: 49
End: 2024-12-11
Payer: COMMERCIAL

## 2024-12-11 DIAGNOSIS — I83.813 VARICOSE VEINS OF BILATERAL LOWER EXTREMITIES WITH PAIN: Primary | ICD-10-CM

## 2024-12-11 PROCEDURE — 99212 OFFICE O/P EST SF 10 MIN: CPT | Performed by: SURGERY

## 2024-12-11 NOTE — ASSESSMENT & PLAN NOTE
Will 49-year-old female virtual visit for follow-up for left lower extremity varicose veins.  She had limited phlebectomies in the left posterior calf as well as a left great saphenous vein EVLT, myself in October.  She has done well since this time she had a postop duplex that demonstrated closure of her great saphenous vein with no ehit.  She reports that her symptoms are much improved her incisions have healed well she does have some small varicosities on her right posterior calf but at this point they are really minor nuisance to her.  I recommended continued compression therapy and she does not wish to proceed with phlebectomy or EVLT on the right at this time she will contact our office if her symptoms worsen for a follow-up visit.  She can follow-up as needed at this time

## 2024-12-16 ENCOUNTER — OFFICE VISIT (OUTPATIENT)
Dept: OTOLARYNGOLOGY | Facility: CLINIC | Age: 49
End: 2024-12-16
Payer: COMMERCIAL

## 2024-12-16 VITALS — BODY MASS INDEX: 25.83 KG/M2 | TEMPERATURE: 98.5 F | HEIGHT: 65 IN | WEIGHT: 155 LBS

## 2024-12-16 DIAGNOSIS — J34.2 DEVIATED NASAL SEPTUM: Primary | ICD-10-CM

## 2024-12-16 DIAGNOSIS — R44.8 SENSATION OF PRESSURE IN FACE: ICD-10-CM

## 2024-12-16 DIAGNOSIS — R09.82 POST-NASAL DRIP: ICD-10-CM

## 2024-12-16 DIAGNOSIS — J32.9 RECURRENT SINUS INFECTIONS: ICD-10-CM

## 2024-12-16 DIAGNOSIS — G43.009 MIGRAINE WITHOUT AURA AND WITHOUT STATUS MIGRAINOSUS, NOT INTRACTABLE: Primary | ICD-10-CM

## 2024-12-16 DIAGNOSIS — J34.3 HYPERTROPHY OF BOTH INFERIOR NASAL TURBINATES: ICD-10-CM

## 2024-12-16 DIAGNOSIS — L29.9 ITCHING OF EAR: ICD-10-CM

## 2024-12-16 PROCEDURE — 31231 NASAL ENDOSCOPY DX: CPT | Performed by: STUDENT IN AN ORGANIZED HEALTH CARE EDUCATION/TRAINING PROGRAM

## 2024-12-16 PROCEDURE — 99214 OFFICE O/P EST MOD 30 MIN: CPT | Performed by: STUDENT IN AN ORGANIZED HEALTH CARE EDUCATION/TRAINING PROGRAM

## 2024-12-16 RX ORDER — METHIMAZOLE 5 MG/1
TABLET ORAL
COMMUNITY
Start: 2024-11-21

## 2024-12-16 RX ORDER — TOPIRAMATE 50 MG/1
TABLET, FILM COATED ORAL
Qty: 60 TABLET | Refills: 3 | Status: SHIPPED | OUTPATIENT
Start: 2024-12-16

## 2024-12-16 RX ORDER — FLUOCINOLONE ACETONIDE 0.11 MG/ML
5 OIL AURICULAR (OTIC) 2 TIMES DAILY
Qty: 20 ML | Refills: 0 | Status: SHIPPED | OUTPATIENT
Start: 2024-12-16

## 2024-12-16 NOTE — PROGRESS NOTES
Otolaryngology-- Head and Neck Surgery Follow up visit      Follow up:    10/30/23:  Cherie Arias, a 48-year-old, has come in with a primary concern that has been bothering her for the past two years, which is recurrent sinus infections. She has been prescribed multiple courses of antibiotics over the past few months to address this issue. Regarding her nose and sinuses, she reports symptoms such as nasal blockage, chronic nasal drainage, post-nasal drip, facial pressure, headaches, diminished sense of smell, a persistent cough, and frequent throat clearing. However, she denies any history of sneezing, itchy eyes, or nasal bleeding. Cherie has not undergone any nose or sinus surgeries, but she does have a history of bronchial asthma. There is no recent sinus CT scan or history of allergy skin testing. She mentions that Flonase causes headaches and also reports itching in her ears.     1/8/24:  Still complaining of PND and cough  Doxycycline  Prednisolone  Azelastine  Saline rinse  Did not help    6/3/24:  Here for CT sinus review. Was recently in the hospital for stroke work up.  She has been on 7-8 courses of antibiotics   Flonase spray gives her a headache. Astelin spray discontinued.   She uses nasal saline rinse occasionally.    12/16/24:  Had another sinusitis flare up 2 weeks ago.   Productive mucus  greenish  Review of any relevant imaging:        Interval Review of systems: Pertinent review of systems documented in the HPI.    Interval Social History:  Social History     Socioeconomic History    Marital status: /Civil Union     Spouse name: Not on file    Number of children: 6    Years of education: Not on file    Highest education level: Not on file   Occupational History    Not on file   Tobacco Use    Smoking status: Every Day     Current packs/day: 0.50     Types: Cigarettes     Passive exposure: Never    Smokeless tobacco: Never    Tobacco comments:     Uses nicorette gum.   Vaping Use     "Vaping status: Never Used   Substance and Sexual Activity    Alcohol use: Not Currently     Comment: Every now and then    Drug use: Yes     Types: Marijuana     Comment: occ    Sexual activity: Yes     Partners: Male   Other Topics Concern    Not on file   Social History Narrative    Not on file     Social Drivers of Health     Financial Resource Strain: Low Risk  (8/28/2024)    Overall Financial Resource Strain (CARDIA)     Difficulty of Paying Living Expenses: Not very hard   Food Insecurity: No Food Insecurity (8/28/2024)    Nursing - Inadequate Food Risk Classification     Worried About Running Out of Food in the Last Year: Never true     Ran Out of Food in the Last Year: Never true     Ran Out of Food in the Last Year: Not on file   Transportation Needs: No Transportation Needs (8/28/2024)    PRAPARE - Transportation     Lack of Transportation (Medical): No     Lack of Transportation (Non-Medical): No   Physical Activity: Not on file   Stress: No Stress Concern Present (8/28/2024)    [employer] - Occupational Stress Questionnaire     Feeling of Stress : Not at all   Social Connections: Not on file   Intimate Partner Violence: Not on file   Housing Stability: Low Risk  (8/28/2024)    Housing Stability Vital Sign     Unable to Pay for Housing in the Last Year: No     Number of Times Moved in the Last Year: 1     Homeless in the Last Year: No       Interval Physical Examination:  Temp 98.5 °F (36.9 °C) (Temporal)   Ht 5' 5\" (1.651 m)   Wt 70.3 kg (155 lb)   BMI 25.79 kg/m²       Head: Atraumatic, no visible scalp lesions, parotid and submandibular salivary glands non-tender to palpation and without masses bilaterally.   Neck:  No visible or palpable cervical lesions or lymphadenopathy, thyroid gland is normal in size and symmetry and without masses, normal laryngeal elevation with swallowing.   Ears:    Right ear :  Auricle normal in appearance, mastoid prominence non-tender, " external auditory canal clear. Tympanic membranes intact.   Left ear :  Auricle normal in appearance, mastoid prominence non-tender, external auditory canal clear . Tympanic membranes intact.   Nose/Sinuses:  External appearance unremarkable, no maxillary or frontal sinus tenderness to palpation bilaterally. Nasal endoscopic examination showed deviated nasal septum, bilateral enlarged inferior turbinates, no polyps, thick clear mucus, middle, superior meatus and sphenoethmoid recess yellow mucus     Oral Cavity:  Moist mucus membranes, gums and dentition unremarkable, no oral mucosal masses or lesions, floor of mouth soft, tongue mobile without masses or lesions.   Oropharynx:  Base of tongue soft and without masses, tonsils bilaterally unremarkable, soft palate mucosa unremarkable.      Eyes:  Extra-ocular movements intact, pupils equally round and reactive to light and accommodation, the lids and conjunctivae are normal in appearance.  Constitutional:  Well developed, well nourished and groomed, in no acute distress.   Cardiovascular:  Normal rate and rhythm, no palpable thrills, no jugulovenous distension observed.  Respiratory:  Normal respiratory effort without evidence of retractions or use of accessory muscles.  Neurologic:  Cranial nerves II-XII intact bilaterally.  Abdomen: Soft and lax  Extremities: No bruises   Psychiatric:  Alert and oriented to time, place and person.  Procedures  Rigid nasal endoscopic examination:  The nasal cavities were decongested with lidocaine and oxymetazoline spray.  Bilateral nasal endoscopy was performed as follows:  Endoscopy type: 0 degree rigid scope  Results: look above  The patient tolerated the procedure well.         Assessment:  No diagnosis found.        Plan:    Chronic rhinosinusitis, Rhinitis and Post nasal drip   Discussed treatment options including use of saline rinses, nasal steroids, allergy medications, allergy testing, imaging, and further surgical  interventions. Given instructions on use of nasal steroids, saline rinses and allergy medications.  Reviewed CT sinus which showed pansinusitis. Will defer surgery at this time because she needs further work up for MRI findings with neurology. In the meantime will continue medical mgmt.  Augmentin x 14 days      Itchy ear  Dermotic oil drops

## 2025-01-03 ENCOUNTER — APPOINTMENT (OUTPATIENT)
Dept: LAB | Facility: HOSPITAL | Age: 50
End: 2025-01-03
Attending: INTERNAL MEDICINE
Payer: COMMERCIAL

## 2025-01-03 DIAGNOSIS — E05.90 PRETIBIAL MYXEDEMA: Primary | ICD-10-CM

## 2025-01-03 DIAGNOSIS — E04.2 NONTOXIC MULTINODULAR GOITER: ICD-10-CM

## 2025-01-03 PROCEDURE — 84439 ASSAY OF FREE THYROXINE: CPT

## 2025-01-12 LAB — T4 FREE SERPL DIALY-MCNC: 1.3 NG/DL

## 2025-01-13 ENCOUNTER — RESULTS FOLLOW-UP (OUTPATIENT)
Dept: ENDOCRINOLOGY | Facility: CLINIC | Age: 50
End: 2025-01-13

## 2025-02-18 DIAGNOSIS — F32.A ANXIETY AND DEPRESSION: ICD-10-CM

## 2025-02-18 DIAGNOSIS — F41.9 ANXIETY AND DEPRESSION: ICD-10-CM

## 2025-02-18 RX ORDER — BUSPIRONE HYDROCHLORIDE 5 MG/1
5 TABLET ORAL 2 TIMES DAILY
Qty: 60 TABLET | Refills: 5 | Status: SHIPPED | OUTPATIENT
Start: 2025-02-18

## 2025-03-30 DIAGNOSIS — R29.90 STROKE-LIKE SYMPTOMS: ICD-10-CM

## 2025-03-31 RX ORDER — VERAPAMIL HYDROCHLORIDE 120 MG/1
120 TABLET, FILM COATED, EXTENDED RELEASE ORAL
Qty: 30 TABLET | Refills: 0 | Status: SHIPPED | OUTPATIENT
Start: 2025-03-31

## 2025-03-31 NOTE — TELEPHONE ENCOUNTER
Patient needs an appointment. Please contact the patient to schedule an appointment. Last office visit: 8/26/24    Pt was to follow up in 4 months

## 2025-04-15 DIAGNOSIS — I10 PRIMARY HYPERTENSION: ICD-10-CM

## 2025-04-15 RX ORDER — LISINOPRIL 10 MG/1
5 TABLET ORAL
Qty: 30 TABLET | Refills: 1 | Status: SHIPPED | OUTPATIENT
Start: 2025-04-15

## 2025-05-07 DIAGNOSIS — G43.009 MIGRAINE WITHOUT AURA AND WITHOUT STATUS MIGRAINOSUS, NOT INTRACTABLE: ICD-10-CM

## 2025-05-07 DIAGNOSIS — R29.90 STROKE-LIKE SYMPTOMS: ICD-10-CM

## 2025-05-07 RX ORDER — TOPIRAMATE 50 MG/1
TABLET, FILM COATED ORAL
Qty: 60 TABLET | Refills: 5 | Status: SHIPPED | OUTPATIENT
Start: 2025-05-07

## 2025-05-07 RX ORDER — VERAPAMIL HYDROCHLORIDE 120 MG/1
120 TABLET, FILM COATED, EXTENDED RELEASE ORAL
Qty: 30 TABLET | Refills: 5 | Status: SHIPPED | OUTPATIENT
Start: 2025-05-07

## (undated) DEVICE — SPONGE LAP 18 X 18 IN STRL RFD

## (undated) DEVICE — TOWEL SET X-RAY

## (undated) DEVICE — 3M™ STERI-STRIP™ REINFORCED ADHESIVE SKIN CLOSURES, R1547, 1/2 IN X 4 IN (12 MM X 100 MM), 6 STRIPS/ENVELOPE: Brand: 3M™ STERI-STRIP™

## (undated) DEVICE — CHLORAPREP HI-LITE 26ML ORANGE

## (undated) DEVICE — GLOVE SRG BIOGEL ECLIPSE 7.5

## (undated) DEVICE — PACK GENERAL LF

## (undated) DEVICE — KERLIX BANDAGE ROLL: Brand: KERLIX

## (undated) DEVICE — TONGUE DEPRESSOR STERILE

## (undated) DEVICE — ACE WRAP 6 IN XL STERILE

## (undated) DEVICE — FABRIC REINFORCED, SURGICAL GOWN, XL: Brand: CONVERTORS

## (undated) DEVICE — TIBURON SPLIT SHEET: Brand: CONVERTORS

## (undated) DEVICE — DRAPE SHEET THREE QUARTER

## (undated) DEVICE — FIBER PROC KIT GOLD TIP 21G 45CM NEVERTOUCH

## (undated) DEVICE — Device

## (undated) DEVICE — INTENDED FOR TISSUE SEPARATION, AND OTHER PROCEDURES THAT REQUIRE A SHARP SURGICAL BLADE TO PUNCTURE OR CUT.: Brand: BARD-PARKER SAFETY BLADES SIZE 11, STERILE

## (undated) DEVICE — STERILE DOUBLE BASIN SET PACK: Brand: CARDINAL HEALTH

## (undated) DEVICE — DRAPE SHEET X-LG

## (undated) DEVICE — ULTRASOUND GEL STERILE FOIL PK

## (undated) DEVICE — PROBE COVER: Brand: STERILE PROBE COVER

## (undated) DEVICE — SPONGE GAUZE 4 X 8 12 PLY STRL LF

## (undated) DEVICE — 3M™ TEGADERM™ TRANSPARENT FILM DRESSING FRAME STYLE, 1626W, 4 IN X 4-3/4 IN (10 CM X 12 CM), 50/CT 4CT/CASE: Brand: 3M™ TEGADERM™

## (undated) DEVICE — DECANTER: Brand: UNBRANDED

## (undated) DEVICE — COBAN 4 IN STERILE

## (undated) DEVICE — 3M™ TEGADERM™ CHG DRESSING 25/CARTON 4 CARTONS/CASE 1659: Brand: TEGADERM™

## (undated) DEVICE — SINGLE SPIKE TUMESCENT SET W/O CHAMBER - STERILE: Brand: CUSTOM MEDICAL SPECIALTIES, INC.